# Patient Record
Sex: MALE | Race: WHITE | NOT HISPANIC OR LATINO | Employment: UNEMPLOYED | URBAN - METROPOLITAN AREA
[De-identification: names, ages, dates, MRNs, and addresses within clinical notes are randomized per-mention and may not be internally consistent; named-entity substitution may affect disease eponyms.]

---

## 2018-09-11 ENCOUNTER — TELEPHONE (OUTPATIENT)
Dept: MEDICAL GROUP | Facility: PHYSICIAN GROUP | Age: 53
End: 2018-09-11

## 2018-09-11 RX ORDER — LOSARTAN POTASSIUM 50 MG/1
50 TABLET ORAL DAILY
COMMUNITY
End: 2018-09-12 | Stop reason: SDUPTHER

## 2018-09-11 RX ORDER — AMLODIPINE BESYLATE 10 MG/1
10 TABLET ORAL DAILY
COMMUNITY
End: 2018-09-12 | Stop reason: SDUPTHER

## 2018-09-11 RX ORDER — HYDROXYZINE HYDROCHLORIDE 25 MG/1
25 TABLET, FILM COATED ORAL 3 TIMES DAILY PRN
COMMUNITY
End: 2018-09-12 | Stop reason: SDUPTHER

## 2018-09-11 NOTE — TELEPHONE ENCOUNTER
Future Appointments       Provider Department Center    9/12/2018 4:35 PM Radha Castro M.D. [facility]        NEW PATIENT VISIT PRE-VISIT PLANNING    1.  EpicCare Patient is checked in Patient Demographics? YES    2.  Immunizations were updated in Epic using WebIZ?: No WebIZ record       •  Web Iz Recommendations: FLU, TDAP and ZOSTAVAX (Shingles)    3.  Is this appointment scheduled as a Hospital Follow-Up? No    4.  Patient is due for the following Health Maintenance Topics:   Health Maintenance Due   Topic Date Due   • IMM DTaP/Tdap/Td Vaccine (1 - Tdap) 04/06/1983   • COLONOSCOPY  04/06/2014   • IMM ZOSTER VACCINES (1 of 2) 04/06/2014   • IMM INFLUENZA (1) 09/01/2018       5.  Reviewed/Updated the following with patient:   •   Preferred Pharmacy? YES       •   Preferred Lab? YES       •   Preferred Communication? YES       •   Allergies? YES       •   Medications? YES. Was Abstract Encounter opened and chart updated? YES       •   Social History? YES. Was Abstract Encounter opened and chart updated? YES       •   Family History (document living status of immediate family members and if + hx of cancer, diabetes, hypertension, hyperlipidemia, heart attack, stroke) YES. Was Abstract Encounter opened and chart updated? YES    6.  Updated Care Team?       •   DME Company (gait device, O2, CPAP, etc.) YES       •   Other Specialists (eye doctor, derm, GYN, cardiology, endo, etc): YES    7.  MDX printed for Provider? NO    8.  Patient was informed to arrive 15 min prior to their   scheduled appointment and bring in their medication bottles.

## 2018-09-12 ENCOUNTER — OFFICE VISIT (OUTPATIENT)
Dept: MEDICAL GROUP | Facility: PHYSICIAN GROUP | Age: 53
End: 2018-09-12
Payer: COMMERCIAL

## 2018-09-12 VITALS
HEART RATE: 90 BPM | WEIGHT: 306 LBS | OXYGEN SATURATION: 97 % | TEMPERATURE: 98.9 F | BODY MASS INDEX: 43.81 KG/M2 | SYSTOLIC BLOOD PRESSURE: 140 MMHG | DIASTOLIC BLOOD PRESSURE: 90 MMHG | RESPIRATION RATE: 18 BRPM | HEIGHT: 70 IN

## 2018-09-12 DIAGNOSIS — G47.26 SHIFT WORK SLEEP DISORDER: ICD-10-CM

## 2018-09-12 DIAGNOSIS — E66.01 MORBID OBESITY WITH BMI OF 40.0-44.9, ADULT (HCC): ICD-10-CM

## 2018-09-12 DIAGNOSIS — Z12.12 SCREENING FOR COLORECTAL CANCER: ICD-10-CM

## 2018-09-12 DIAGNOSIS — E78.2 MIXED HYPERLIPIDEMIA: ICD-10-CM

## 2018-09-12 DIAGNOSIS — Z23 NEED FOR VACCINATION: ICD-10-CM

## 2018-09-12 DIAGNOSIS — Z12.5 SCREENING FOR PROSTATE CANCER: ICD-10-CM

## 2018-09-12 DIAGNOSIS — Z12.11 SCREENING FOR COLORECTAL CANCER: ICD-10-CM

## 2018-09-12 DIAGNOSIS — L30.9 DERMATITIS: ICD-10-CM

## 2018-09-12 DIAGNOSIS — I10 ESSENTIAL HYPERTENSION: ICD-10-CM

## 2018-09-12 PROCEDURE — 90686 IIV4 VACC NO PRSV 0.5 ML IM: CPT | Performed by: FAMILY MEDICINE

## 2018-09-12 PROCEDURE — 99204 OFFICE O/P NEW MOD 45 MIN: CPT | Mod: 25 | Performed by: FAMILY MEDICINE

## 2018-09-12 PROCEDURE — 90471 IMMUNIZATION ADMIN: CPT | Performed by: FAMILY MEDICINE

## 2018-09-12 RX ORDER — ZOLPIDEM TARTRATE 10 MG/1
10 TABLET ORAL
COMMUNITY
Start: 2018-02-26 | End: 2018-09-12 | Stop reason: SDUPTHER

## 2018-09-12 RX ORDER — AMLODIPINE BESYLATE 10 MG/1
10 TABLET ORAL DAILY
Qty: 90 TAB | Refills: 2 | Status: SHIPPED | OUTPATIENT
Start: 2018-09-12 | End: 2019-01-31 | Stop reason: SDUPTHER

## 2018-09-12 RX ORDER — LOSARTAN POTASSIUM 50 MG/1
50 TABLET ORAL DAILY
Qty: 90 TAB | Refills: 3 | Status: SHIPPED | OUTPATIENT
Start: 2018-09-12 | End: 2019-01-31 | Stop reason: SDUPTHER

## 2018-09-12 RX ORDER — ZOLPIDEM TARTRATE 10 MG/1
10 TABLET ORAL NIGHTLY PRN
Qty: 30 TAB | Refills: 1 | Status: SHIPPED | OUTPATIENT
Start: 2018-09-12 | End: 2018-11-11

## 2018-09-12 RX ORDER — CLOBETASOL PROPIONATE 0.5 MG/G
1 OINTMENT TOPICAL 2 TIMES DAILY
Qty: 60 G | Refills: 1 | Status: SHIPPED | OUTPATIENT
Start: 2018-09-12 | End: 2019-08-27

## 2018-09-12 RX ORDER — HYDROXYZINE HYDROCHLORIDE 25 MG/1
25 TABLET, FILM COATED ORAL 3 TIMES DAILY PRN
Qty: 30 TAB | Refills: 5 | Status: SHIPPED | OUTPATIENT
Start: 2018-09-12 | End: 2019-01-31 | Stop reason: SDUPTHER

## 2018-09-12 ASSESSMENT — PATIENT HEALTH QUESTIONNAIRE - PHQ9: CLINICAL INTERPRETATION OF PHQ2 SCORE: 0

## 2018-09-13 PROBLEM — L30.9 DERMATITIS: Status: ACTIVE | Noted: 2018-09-13

## 2018-09-13 NOTE — PROGRESS NOTES
cc: obesity       Subjective:     Braden Cruz is a 53 y.o. male presenting for the following:     Obesity: Patient has struggled with his weight for most of his adult life.  He has done many different diets in the past and has managed to lose 20-30 pounds but the weight will come back quickly once these diet stop.  He has now developed high blood pressure (controlled with amlodipine and losartan) and dyslipidemia.  He has not had elevated blood sugars, but is of course worried about developing diabetes as well.  He has looked into this and thought about it for a long time, and he is decided he is very interested in the gastric sleeve bariatric surgery.    Hypertension: Takes his amlodipine and losartan daily.  Denies lightheadedness, palpitations, chest pain, swelling.    Hyperlipidemia: Has been told that he has high cholesterol but that his overall risk is borderline for starting statin.  He has never been on a statin in the past.  He is of course hoping that this problem will improve with weight loss.    Eczema: For more than a year patient has had difficulty with dry itchy skin over his anterior legs bilaterally.  He denies difficulty with swelling/edema.  It is never become infected, warm, red, weeping, painful.  It is just very itchy.  He has problem with dry itchy skin chronically, does take Atarax for this occasionally which helps him sleep.    4 days he works, patient has to get up at 1 AM.  Then over the weekend he will adjust to a more normal sleeping schedule.  Then when he goes to bed at 4 PM on Sunday he will have a very hard time getting to sleep.  So on Sundays he uses Ambien.  He is well aware that Ambien is addictive, he only uses this once or twice per week.  He has been stable on this dose and frequency for many years.    Review of systems:  All others reviewed and are negative.       Current Outpatient Prescriptions:   •  amLODIPine (NORVASC) 10 MG Tab, Take 1 Tab by mouth every  "day., Disp: 90 Tab, Rfl: 2  •  hydrOXYzine HCl (ATARAX) 25 MG Tab, Take 1 Tab by mouth 3 times a day as needed for Itching., Disp: 30 Tab, Rfl: 5  •  zolpidem (AMBIEN) 10 MG Tab, Take 1 Tab by mouth at bedtime as needed for Sleep for up to 60 days., Disp: 30 Tab, Rfl: 1  •  losartan (COZAAR) 50 MG Tab, Take 1 Tab by mouth every day., Disp: 90 Tab, Rfl: 3  •  clobetasol (TEMOVATE) 0.05 % Ointment, Apply 1 Application to affected area(s) 2 times a day., Disp: 60 g, Rfl: 1    Allergies, past medical history, past surgical history, family history, social history reviewed and updated    Objective:     Vitals: /90   Pulse 90   Temp 37.2 °C (98.9 °F)   Resp 18   Ht 1.778 m (5' 10\")   Wt (!) 138.8 kg (306 lb)   SpO2 97%   BMI 43.91 kg/m²   General: Alert, pleasant, NAD  HEENT: Normocephalic.   EOMI, no icterus or pallor.  Conjunctivae and lids normal. External ears normal. Oropharynx non-erythematous, mucous membranes moist.    Neck supple.  No thyromegaly or masses palpated. No cervical or supraclavicular lymphadenopathy.  Heart: Regular rate and rhythm.  S1 and S2 normal.  No murmurs appreciated.  Respiratory: Normal respiratory effort.  Clear to auscultation bilaterally.  Abdomen: Non-distended, soft  Skin: Warm, dry.  Anterior legs bilaterally with patches of dry skin and scattered excoriations.  No induration, erythema, warmth.  Musculoskeletal: Gait is normal.  Moves all extremities well.  Extremities: No leg edema.    Neurological: No tremors, sensation grossly intact,  tone/strength normal, gait is normal, CN2-12 grossly intact  Psych:  Affect is normal, judgement is good, memory is intact, grooming is appropriate.    Assessment/Plan:     Braden was seen today for annual exam and weight loss.    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  He only uses this as needed and is stable on dose and frequency of max twice per " week.  -     zolpidem (AMBIEN) 10 MG Tab; Take 1 Tab by mouth at bedtime as needed for Sleep for up to 60 days.    Morbid obesity with BMI of 40.0-44.9, adult (HCC)-patient is very interested in the gastric sleeve bariatric surgery.  He has struggled with his weight for his entire adult life and has never been able to keep a substantial amount of weight off.  Will refer.  -     Patient identified as having weight management issue.  Appropriate orders and counseling given.  -     HEMOGLOBIN A1C; Future  -     LIPID PROFILE; Future  -     COMP METABOLIC PANEL; Future  -     CBC WITHOUT DIFFERENTIAL; Future  -     TSH WITH REFLEX TO FT4; Future  -     REFERRAL TO BARIATRIC SURGERY    Screening for colorectal cancer no melena or bright red blood per rectum.  No weight loss.  No first-degree relative with colon cancer.  -     OCCULT BLOOD FECES IMMUNOASSAY (FIT); Future    Screening for prostate cancer-no nocturia, dysuria, frequency, hesitancy.  No first-degree relatives with prostate cancer.  Patient counseled on risk of false positive with PSA.  Patient chooses to do the screening.  -     PROSTATE SPECIFIC AG SCREENING; Future    Need for vaccination   Patient due for vaccination.  Patient warned of possible side effect including pain, reaction at injection site, fatigue, low-grade fever.  Also, patient warned of uncommon severe reactions including allergic reaction/anaphylaxis.     -     INFLUENZA VACCINE QUAD INJ >3Y(PF)    Essential hypertension-currently controlled with medication.  Patient is hoping that this problem improves after weight loss.  -     amLODIPine (NORVASC) 10 MG Tab; Take 1 Tab by mouth every day.  -     losartan (COZAAR) 50 MG Tab; Take 1 Tab by mouth every day.  -     REFERRAL TO BARIATRIC SURGERY    Mixed hyperlipidemia-patient has never been on a statin, told he was borderline.  -     REFERRAL TO BARIATRIC SURGERY    Dermatitis-like eczema without edema.  Possibly early stasis dermatitis, but  would be mild and early as no swelling.  No signs of infection currently patient warned of side effect of thinning skin with chronic use of steroid.  Also told to stop using if any signs of infection.  -     hydrOXYzine HCl (ATARAX) 25 MG Tab; Take 1 Tab by mouth 3 times a day as needed for Itching.  -     clobetasol (TEMOVATE) 0.05 % Ointment; Apply 1 Application to affected area(s) 2 times a day.    Return if symptoms worsen or fail to improve.

## 2018-10-09 ENCOUNTER — TELEPHONE (OUTPATIENT)
Dept: MEDICAL GROUP | Facility: PHYSICIAN GROUP | Age: 53
End: 2018-10-09

## 2018-10-09 NOTE — TELEPHONE ENCOUNTER
Future Appointments       Provider Department Center    10/10/2018 4:55 PM Radha Castro M.D. [facility]        ESTABLISHED PATIENT PRE-VISIT PLANNING     Note: Patient will not be contacted if there is no indication to call.     1.  Reviewed notes from the last few office visits within the medical group: Yes    2.  If any orders were placed at last visit or intended to be done for this visit (i.e. 6 mos follow-up), do we have Results/Consult Notes?        •  Labs - Labs ordered, NOT completed. Patient advised to complete prior to next appointment.       •  Imaging - Imaging was not ordered at last office visit.       •  Referrals - Referral ordered, patient has NOT been seen.    3. Is this appointment scheduled as a Hospital Follow-Up? No    4.  Immunizations were updated in [email or web address] using WebIZ?: Yes       •  Web Iz Recommendations: MENACTRA (MCV4), TDAP, VARICELLA (Chicken Pox)  and ZOSTAVAX (Shingles)    5.  Patient is due for the following Health Maintenance Topics:   Health Maintenance Due   Topic Date Due   • COLONOSCOPY  04/06/2014   • IMM ZOSTER VACCINES (1 of 2) 04/06/2014   • IMM DTaP/Tdap/Td Vaccine (2 - Td) 01/01/2018       6.  MDX printed for Provider? NO    7.  Patient was informed to arrive 15 min prior to their scheduled appointment and bring in their medication bottles. Confirmed through automated call

## 2018-12-07 ENCOUNTER — TELEPHONE (OUTPATIENT)
Dept: MEDICAL GROUP | Facility: PHYSICIAN GROUP | Age: 53
End: 2018-12-07

## 2018-12-07 DIAGNOSIS — I10 ESSENTIAL HYPERTENSION: ICD-10-CM

## 2018-12-07 DIAGNOSIS — L30.9 DERMATITIS: ICD-10-CM

## 2018-12-11 ENCOUNTER — TELEPHONE (OUTPATIENT)
Dept: MEDICAL GROUP | Facility: PHYSICIAN GROUP | Age: 53
End: 2018-12-11

## 2018-12-12 ENCOUNTER — HOSPITAL ENCOUNTER (OUTPATIENT)
Dept: LAB | Facility: MEDICAL CENTER | Age: 53
End: 2018-12-12
Attending: FAMILY MEDICINE
Payer: COMMERCIAL

## 2018-12-12 ENCOUNTER — OFFICE VISIT (OUTPATIENT)
Dept: MEDICAL GROUP | Facility: PHYSICIAN GROUP | Age: 53
End: 2018-12-12
Payer: COMMERCIAL

## 2018-12-12 VITALS
DIASTOLIC BLOOD PRESSURE: 90 MMHG | BODY MASS INDEX: 43.75 KG/M2 | SYSTOLIC BLOOD PRESSURE: 154 MMHG | HEIGHT: 70 IN | OXYGEN SATURATION: 93 % | HEART RATE: 107 BPM | WEIGHT: 305.6 LBS | TEMPERATURE: 98.4 F

## 2018-12-12 DIAGNOSIS — Z71.3 WEIGHT LOSS COUNSELING, ENCOUNTER FOR: ICD-10-CM

## 2018-12-12 DIAGNOSIS — Z12.5 SCREENING FOR PROSTATE CANCER: ICD-10-CM

## 2018-12-12 DIAGNOSIS — E66.01 MORBID OBESITY WITH BMI OF 40.0-44.9, ADULT (HCC): ICD-10-CM

## 2018-12-12 DIAGNOSIS — I10 ESSENTIAL HYPERTENSION: ICD-10-CM

## 2018-12-12 LAB
ALBUMIN SERPL BCP-MCNC: 4.1 G/DL (ref 3.2–4.9)
ALBUMIN/GLOB SERPL: 1.3 G/DL
ALP SERPL-CCNC: 89 U/L (ref 30–99)
ALT SERPL-CCNC: 21 U/L (ref 2–50)
ANION GAP SERPL CALC-SCNC: 6 MMOL/L (ref 0–11.9)
AST SERPL-CCNC: 19 U/L (ref 12–45)
BILIRUB SERPL-MCNC: 0.8 MG/DL (ref 0.1–1.5)
BUN SERPL-MCNC: 10 MG/DL (ref 8–22)
CALCIUM SERPL-MCNC: 9.7 MG/DL (ref 8.5–10.5)
CHLORIDE SERPL-SCNC: 104 MMOL/L (ref 96–112)
CHOLEST SERPL-MCNC: 264 MG/DL (ref 100–199)
CO2 SERPL-SCNC: 29 MMOL/L (ref 20–33)
CREAT SERPL-MCNC: 0.71 MG/DL (ref 0.5–1.4)
ERYTHROCYTE [DISTWIDTH] IN BLOOD BY AUTOMATED COUNT: 43.5 FL (ref 35.9–50)
EST. AVERAGE GLUCOSE BLD GHB EST-MCNC: 143 MG/DL
FASTING STATUS PATIENT QL REPORTED: NORMAL
GLOBULIN SER CALC-MCNC: 3.2 G/DL (ref 1.9–3.5)
GLUCOSE SERPL-MCNC: 96 MG/DL (ref 65–99)
HBA1C MFR BLD: 6.6 % (ref 0–5.6)
HCT VFR BLD AUTO: 49.5 % (ref 42–52)
HDLC SERPL-MCNC: 58 MG/DL
HGB BLD-MCNC: 16.7 G/DL (ref 14–18)
LDLC SERPL CALC-MCNC: 179 MG/DL
MCH RBC QN AUTO: 32 PG (ref 27–33)
MCHC RBC AUTO-ENTMCNC: 33.7 G/DL (ref 33.7–35.3)
MCV RBC AUTO: 94.8 FL (ref 81.4–97.8)
PLATELET # BLD AUTO: 236 K/UL (ref 164–446)
PMV BLD AUTO: 9 FL (ref 9–12.9)
POTASSIUM SERPL-SCNC: 4.1 MMOL/L (ref 3.6–5.5)
PROT SERPL-MCNC: 7.3 G/DL (ref 6–8.2)
PSA SERPL-MCNC: 0.66 NG/ML (ref 0–4)
RBC # BLD AUTO: 5.22 M/UL (ref 4.7–6.1)
SODIUM SERPL-SCNC: 139 MMOL/L (ref 135–145)
TRIGL SERPL-MCNC: 133 MG/DL (ref 0–149)
TSH SERPL DL<=0.005 MIU/L-ACNC: 2.77 UIU/ML (ref 0.38–5.33)
WBC # BLD AUTO: 8.9 K/UL (ref 4.8–10.8)

## 2018-12-12 PROCEDURE — 36415 COLL VENOUS BLD VENIPUNCTURE: CPT

## 2018-12-12 PROCEDURE — 84153 ASSAY OF PSA TOTAL: CPT

## 2018-12-12 PROCEDURE — 80061 LIPID PANEL: CPT

## 2018-12-12 PROCEDURE — 80053 COMPREHEN METABOLIC PANEL: CPT

## 2018-12-12 PROCEDURE — 85027 COMPLETE CBC AUTOMATED: CPT

## 2018-12-12 PROCEDURE — 99213 OFFICE O/P EST LOW 20 MIN: CPT | Performed by: FAMILY MEDICINE

## 2018-12-12 PROCEDURE — 83036 HEMOGLOBIN GLYCOSYLATED A1C: CPT

## 2018-12-12 PROCEDURE — 84443 ASSAY THYROID STIM HORMONE: CPT

## 2018-12-12 RX ORDER — ZOLPIDEM TARTRATE 10 MG/1
TABLET ORAL
COMMUNITY
Start: 2018-11-19 | End: 2019-01-07 | Stop reason: SDUPTHER

## 2018-12-12 NOTE — PROGRESS NOTES
"cc: weight loss counseling      Subjective:     Braden Cruz is a 53 y.o. male presenting for the following:     Patient here for weight loss counseling.     He has been cutting down carbohydrates and is getting the [employer] meals for dinner at night. He has not seen any weight loss but is still feeling positive and motivated to change his diet and improve his health.     BP is a little high today but patient was stuck in [place] and did not have his medication last night. No CP, palpitations, swelling, headache.       Review of systems:  All others reviewed and are negative.       Current Outpatient Prescriptions:   •  amLODIPine (NORVASC) 10 MG Tab, Take 1 Tab by mouth every day., Disp: 90 Tab, Rfl: 2  •  hydrOXYzine HCl (ATARAX) 25 MG Tab, Take 1 Tab by mouth 3 times a day as needed for Itching., Disp: 30 Tab, Rfl: 5  •  losartan (COZAAR) 50 MG Tab, Take 1 Tab by mouth every day., Disp: 90 Tab, Rfl: 3  •  clobetasol (TEMOVATE) 0.05 % Ointment, Apply 1 Application to affected area(s) 2 times a day., Disp: 60 g, Rfl: 1  •  zolpidem (AMBIEN) 10 MG Tab, , Disp: , Rfl:     Allergies, past medical history, past surgical history, family history, social history reviewed and updated    Objective:     Vitals: /90 (BP Location: Left arm)   Pulse (!) 107   Temp 36.9 °C (98.4 °F) (Temporal)   Ht 1.778 m (5' 10\")   Wt (!) 138.6 kg (305 lb 9.6 oz)   SpO2 93%   BMI 43.85 kg/m²   General: Alert, pleasant, NAD  Heart: Regular rate and rhythm.  S1 and S2 normal.  No murmurs appreciated.  Abdomen:  soft, non-tender    Assessment/Plan:     Braden was seen today for follow-up.    Diagnoses and all orders for this visit:    Weight loss counseling, encounter for/Morbid obesity with BMI of 40.0-44.9, adult (HCC): patient is continuing to make good dietary changes and feels motivated. Counseled on diet changes and on exercise for joint pain and quality of life.   Patient was seen for 15 minutes face to face of " which > 50% of appointment time was spent on counseling and coordination of care regarding the above.  -is going to lab today to have blood tests drawn.     Essential hypertension: slightly high today but patient did not have medication last night: asymptomatic.       Return in about 3 months (around 3/12/2019).

## 2018-12-13 PROBLEM — E11.9 TYPE 2 DIABETES MELLITUS WITHOUT COMPLICATION, WITHOUT LONG-TERM CURRENT USE OF INSULIN (HCC): Status: ACTIVE | Noted: 2018-12-13

## 2019-01-07 DIAGNOSIS — G47.26 SHIFT WORK SLEEP DISORDER: ICD-10-CM

## 2019-01-08 ENCOUNTER — TELEPHONE (OUTPATIENT)
Dept: MEDICAL GROUP | Facility: PHYSICIAN GROUP | Age: 54
End: 2019-01-08

## 2019-01-08 RX ORDER — ZOLPIDEM TARTRATE 10 MG/1
10 TABLET ORAL NIGHTLY PRN
Qty: 60 TAB | Refills: 0 | Status: SHIPPED | OUTPATIENT
Start: 2019-01-08 | End: 2019-04-09 | Stop reason: SDUPTHER

## 2019-01-31 DIAGNOSIS — L30.9 DERMATITIS: ICD-10-CM

## 2019-01-31 DIAGNOSIS — I10 ESSENTIAL HYPERTENSION: ICD-10-CM

## 2019-02-01 RX ORDER — LOSARTAN POTASSIUM 50 MG/1
50 TABLET ORAL DAILY
Qty: 90 TAB | Refills: 1 | Status: SHIPPED | OUTPATIENT
Start: 2019-02-01 | End: 2019-08-14 | Stop reason: SDUPTHER

## 2019-02-01 RX ORDER — AMLODIPINE BESYLATE 10 MG/1
10 TABLET ORAL DAILY
Qty: 90 TAB | Refills: 1 | Status: SHIPPED | OUTPATIENT
Start: 2019-02-01 | End: 2019-08-14 | Stop reason: SDUPTHER

## 2019-02-01 RX ORDER — HYDROXYZINE HYDROCHLORIDE 25 MG/1
25 TABLET, FILM COATED ORAL 3 TIMES DAILY PRN
Qty: 30 TAB | Refills: 5 | Status: SHIPPED | OUTPATIENT
Start: 2019-02-01 | End: 2019-08-14 | Stop reason: SDUPTHER

## 2019-02-01 NOTE — TELEPHONE ENCOUNTER
Refill X 6 months, sent to pharmacy.Pt. Seen in the last 6 months per protocol.   Lab Results   Component Value Date/Time    SODIUM 139 12/12/2018 10:38 AM    POTASSIUM 4.1 12/12/2018 10:38 AM    CHLORIDE 104 12/12/2018 10:38 AM    CO2 29 12/12/2018 10:38 AM    GLUCOSE 96 12/12/2018 10:38 AM    BUN 10 12/12/2018 10:38 AM    CREATININE 0.71 12/12/2018 10:38 AM

## 2019-03-19 DIAGNOSIS — G47.26 SHIFT WORK SLEEP DISORDER: ICD-10-CM

## 2019-03-19 RX ORDER — ZOLPIDEM TARTRATE 10 MG/1
10 TABLET ORAL NIGHTLY PRN
Qty: 60 TAB | Refills: 0 | Status: CANCELLED | OUTPATIENT
Start: 2019-03-19 | End: 2019-05-18

## 2019-03-19 RX ORDER — ZOLPIDEM TARTRATE 10 MG/1
10 TABLET ORAL NIGHTLY PRN
Qty: 60 TAB | Refills: 0 | OUTPATIENT
Start: 2019-03-19 | End: 2019-05-18

## 2019-03-19 NOTE — TELEPHONE ENCOUNTER
On clinic appointment, patient claimed to only use ambien 2-3 times per week. But patient picked up 30 tablets less than 30 days ago. So needs appointment to discuss this if his usage has increased.

## 2019-04-01 ENCOUNTER — APPOINTMENT (OUTPATIENT)
Dept: RADIOLOGY | Facility: IMAGING CENTER | Age: 54
End: 2019-04-01
Attending: FAMILY MEDICINE
Payer: COMMERCIAL

## 2019-04-01 ENCOUNTER — OFFICE VISIT (OUTPATIENT)
Dept: MEDICAL GROUP | Facility: PHYSICIAN GROUP | Age: 54
End: 2019-04-01
Payer: COMMERCIAL

## 2019-04-01 VITALS
HEIGHT: 70 IN | BODY MASS INDEX: 43.67 KG/M2 | OXYGEN SATURATION: 97 % | RESPIRATION RATE: 18 BRPM | SYSTOLIC BLOOD PRESSURE: 140 MMHG | DIASTOLIC BLOOD PRESSURE: 90 MMHG | WEIGHT: 305 LBS | TEMPERATURE: 97.3 F | HEART RATE: 78 BPM

## 2019-04-01 DIAGNOSIS — G89.29 CHRONIC PAIN OF BOTH KNEES: ICD-10-CM

## 2019-04-01 DIAGNOSIS — M25.561 CHRONIC PAIN OF BOTH KNEES: ICD-10-CM

## 2019-04-01 DIAGNOSIS — E66.01 MORBID OBESITY WITH BMI OF 40.0-44.9, ADULT (HCC): ICD-10-CM

## 2019-04-01 DIAGNOSIS — M25.562 CHRONIC PAIN OF BOTH KNEES: ICD-10-CM

## 2019-04-01 DIAGNOSIS — E11.9 TYPE 2 DIABETES MELLITUS WITHOUT COMPLICATION, WITHOUT LONG-TERM CURRENT USE OF INSULIN (HCC): ICD-10-CM

## 2019-04-01 PROCEDURE — 73562 X-RAY EXAM OF KNEE 3: CPT | Mod: TC,LT | Performed by: FAMILY MEDICINE

## 2019-04-01 PROCEDURE — 99214 OFFICE O/P EST MOD 30 MIN: CPT | Performed by: FAMILY MEDICINE

## 2019-04-01 PROCEDURE — 73562 X-RAY EXAM OF KNEE 3: CPT | Mod: TC,RT | Performed by: FAMILY MEDICINE

## 2019-04-01 ASSESSMENT — PATIENT HEALTH QUESTIONNAIRE - PHQ9: CLINICAL INTERPRETATION OF PHQ2 SCORE: 0

## 2019-04-01 NOTE — PROGRESS NOTES
"cc: Knee pain      Subjective:     Braden Cruz is a 53 y.o. male presenting for the following:     Obesity: Still trying to stick to a diet. Has decreased carbohydrates. Exercise is limited by knee pain.  He is aware that his hemoglobin A1c was just into the diabetic range at 6.6.    Knee pain: Patient does have chronic bilateral knee pain. He has had arthroscopic surgery more than 10 years ago on the right knee. He now has bilateral knee pain but worse on the left.  This pain is worse when he stands up from sitting down for a long time or going downstairs.  The pain is mostly lateral to the patella.  He does not have any weakness in the knees and the knees have not given out on him.  No changes in sensation or numbness.  No leg swelling.        Review of systems:  All others reviewed and are negative.       Current Outpatient Prescriptions:   •  amLODIPine (NORVASC) 10 MG Tab, Take 1 Tab by mouth every day., Disp: 90 Tab, Rfl: 1  •  hydrOXYzine HCl (ATARAX) 25 MG Tab, Take 1 Tab by mouth 3 times a day as needed for Itching., Disp: 30 Tab, Rfl: 5  •  losartan (COZAAR) 50 MG Tab, Take 1 Tab by mouth every day., Disp: 90 Tab, Rfl: 1  •  clobetasol (TEMOVATE) 0.05 % Ointment, Apply 1 Application to affected area(s) 2 times a day., Disp: 60 g, Rfl: 1    Allergies, past medical history, past surgical history, family history, social history reviewed and updated    Objective:     Vitals: /90 (BP Location: Right arm, Patient Position: Sitting, BP Cuff Size: Large adult)   Pulse 78   Temp 36.3 °C (97.3 °F) (Temporal)   Resp 18   Ht 1.778 m (5' 10\")   Wt (!) 138.3 kg (305 lb)   SpO2 97%   BMI 43.76 kg/m²   General: Alert, pleasant, NAD  Heart: Regular rate and rhythm.  S1 and S2 normal.  No murmurs appreciated.  Respiratory: Normal respiratory effort.  Clear to auscultation bilaterally.  Musculoskeletal: Gait is normal.  Moves all extremities well.  Knees bilaterally without deformity or skin " changes.  Full range of motion.  Nontender.  No laxity.  Extremities: No leg edema.      Assessment/Plan:     Braden was seen today for weight loss and knee pain.    Diagnoses and all orders for this visit:    Type 2 diabetes mellitus without complication, without long-term current use of insulin ([facility])  Morbid obesity with BMI of 40.0-44.9, adult ([facility]):   Patient with hemoglobin A1c of 6.6.  He is aware that this is just into the diabetic range.  He would like to try to lose weight on his own before starting metformin or a statin.  He is already on losartan.  I also explained the importance of blood pressure control with this, but he has been better controlled in the past and he is just now starting a diet.    Chronic pain of both knees: Likely a mixture of arthritis and PF PS.  Will obtain x-rays for baseline and refer to physical therapy.  Also patient given exercises and stretches today. If no improvement with PT in 4-6 weeks, patient to call office and will refer to Ortho.   -     DX-KNEE 3 VIEWS LEFT; Future  -     DX-KNEE 3 VIEWS RIGHT; Future  -     REFERRAL TO PHYSICAL THERAPY Reason for Therapy: Eval/Treat/Report      Return in about 3 months (around 7/1/2019), or if symptoms worsen or fail to improve.

## 2019-04-02 ENCOUNTER — PATIENT MESSAGE (OUTPATIENT)
Dept: HEALTH INFORMATION MANAGEMENT | Facility: OTHER | Age: 54
End: 2019-04-02

## 2019-04-09 DIAGNOSIS — G47.26 SHIFT WORK SLEEP DISORDER: ICD-10-CM

## 2019-04-09 RX ORDER — ZOLPIDEM TARTRATE 10 MG/1
10 TABLET ORAL NIGHTLY PRN
Qty: 60 TAB | Refills: 0 | Status: SHIPPED | OUTPATIENT
Start: 2019-04-09 | End: 2019-08-27 | Stop reason: SDUPTHER

## 2019-04-11 ENCOUNTER — PATIENT MESSAGE (OUTPATIENT)
Dept: MEDICAL GROUP | Facility: PHYSICIAN GROUP | Age: 54
End: 2019-04-11

## 2019-04-11 DIAGNOSIS — M25.561 CHRONIC PAIN OF BOTH KNEES: ICD-10-CM

## 2019-04-11 DIAGNOSIS — M25.562 CHRONIC PAIN OF BOTH KNEES: ICD-10-CM

## 2019-04-11 DIAGNOSIS — G89.29 CHRONIC PAIN OF BOTH KNEES: ICD-10-CM

## 2019-05-02 ENCOUNTER — HOSPITAL ENCOUNTER (EMERGENCY)
Facility: MEDICAL CENTER | Age: 54
End: 2019-05-02
Attending: EMERGENCY MEDICINE
Payer: COMMERCIAL

## 2019-05-02 ENCOUNTER — APPOINTMENT (OUTPATIENT)
Dept: RADIOLOGY | Facility: MEDICAL CENTER | Age: 54
End: 2019-05-02
Attending: EMERGENCY MEDICINE
Payer: COMMERCIAL

## 2019-05-02 VITALS
WEIGHT: 305 LBS | RESPIRATION RATE: 18 BRPM | OXYGEN SATURATION: 97 % | HEART RATE: 86 BPM | DIASTOLIC BLOOD PRESSURE: 86 MMHG | BODY MASS INDEX: 43.67 KG/M2 | HEIGHT: 70 IN | TEMPERATURE: 98 F | SYSTOLIC BLOOD PRESSURE: 125 MMHG

## 2019-05-02 DIAGNOSIS — S89.90XA KNEE INJURY, INITIAL ENCOUNTER: ICD-10-CM

## 2019-05-02 PROCEDURE — 73564 X-RAY EXAM KNEE 4 OR MORE: CPT | Mod: LT

## 2019-05-02 PROCEDURE — 99284 EMERGENCY DEPT VISIT MOD MDM: CPT

## 2019-05-02 PROCEDURE — 96372 THER/PROPH/DIAG INJ SC/IM: CPT

## 2019-05-02 PROCEDURE — 700111 HCHG RX REV CODE 636 W/ 250 OVERRIDE (IP): Performed by: EMERGENCY MEDICINE

## 2019-05-02 RX ORDER — ONDANSETRON 4 MG/1
4 TABLET, ORALLY DISINTEGRATING ORAL ONCE
Status: COMPLETED | OUTPATIENT
Start: 2019-05-02 | End: 2019-05-02

## 2019-05-02 RX ORDER — HYDROCODONE BITARTRATE AND ACETAMINOPHEN 5; 325 MG/1; MG/1
1-2 TABLET ORAL EVERY 6 HOURS PRN
Qty: 20 TAB | Refills: 0 | Status: SHIPPED | OUTPATIENT
Start: 2019-05-02 | End: 2019-05-07

## 2019-05-02 RX ORDER — MORPHINE SULFATE 4 MG/ML
4 INJECTION, SOLUTION INTRAMUSCULAR; INTRAVENOUS ONCE
Status: COMPLETED | OUTPATIENT
Start: 2019-05-02 | End: 2019-05-02

## 2019-05-02 RX ORDER — IBUPROFEN 600 MG/1
600 TABLET ORAL EVERY 6 HOURS PRN
Qty: 30 TAB | Refills: 0 | Status: SHIPPED | OUTPATIENT
Start: 2019-05-02 | End: 2019-05-22

## 2019-05-02 RX ADMIN — ONDANSETRON 4 MG: 4 TABLET, ORALLY DISINTEGRATING ORAL at 17:41

## 2019-05-02 RX ADMIN — MORPHINE SULFATE 4 MG: 4 INJECTION INTRAVENOUS at 17:41

## 2019-05-02 ASSESSMENT — LIFESTYLE VARIABLES: DO YOU DRINK ALCOHOL: NO

## 2019-05-02 NOTE — ED TRIAGE NOTES
"WC to triage w/ c/o L knee pain secondary to slipping and twisting it.  Pt reports feeling a \"pop\".  States +cms.    "

## 2019-05-03 NOTE — ED NOTES
All results back  Chart up for re-evaluation   Pt reports bilateral feet numbness radiating to the calves.

## 2019-05-03 NOTE — DISCHARGE INSTRUCTIONS
X-ray shows no evidence of fracture.  You may have a ligament injury.  Wear the knee immobilizer when you are up and ambulating.  Follow-up with your orthopedic doctor for further evaluation.  Take the medication as directed.  Ice and elevate.

## 2019-05-03 NOTE — ED PROVIDER NOTES
"CHIEF COMPLAINT  Chief Complaint   Patient presents with   • Knee Pain   • T-5000       HPI  Braden Cruz is a 54 y.o. male who presents to the emergency department with left knee pain.  Patient was out in the yard when he stepped funny on a curb and twisted his knee.  He is having lateral left knee pain.  Patient has a history of bilateral chronic knee problems.  He saw Dr. Kuhn yesterday and has MRI scheduled of both knees.  He is having much worse pain after twisting his left knee.  He is having some swelling.  He denies any numbness or tingling or coolness to his foot.  He denies any deformity/dislocation of his knee.  He denies any hip pain.    REVIEW OF SYSTEMS  See HPI for further details.  No numbness coolness tingling, no hip pain, no bleeding tendencies.    PAST MEDICAL HISTORY  Chronic bilateral knee pain        SOCIAL HISTORY  Social History     Social History   • Marital status:      Spouse name: N/A   • Number of children: N/A   • Years of education: N/A     Social History Main Topics   • Smoking status: Never Smoker   • Smokeless tobacco: Never Used   • Alcohol use Yes   • Drug use: No   • Sexual activity: Not on file     Other Topics Concern   • Not on file     Social History Narrative   • No narrative on file       SURGICAL HISTORY  No past surgical history on file.    CURRENT MEDICATIONS  Home Medications    **Home medications have not yet been reviewed for this encounter**         ALLERGIES  No Known Allergies    PHYSICAL EXAM  VITAL SIGNS: /95   Pulse 97   Temp 36.7 °C (98 °F) (Temporal)   Resp 16   Ht 1.778 m (5' 10\")   Wt (!) 138.3 kg (305 lb)   SpO2 97%   BMI 43.76 kg/m²       Constitutional: Well developed, Well nourished, No acute distress, Non-toxic appearance.   Cardiovascular: Good pulses on the affected extremity. Good capillary refill.  Thorax & Lungs: No respiratory distress  Skin: No abrasions or evidence of ecchymosis  Musculoskeletal: Left knee " with tenderness along the lateral aspect.  Minimal swelling.  Slight knee effusion.  No warmth or erythema, no tenderness in the tib-fib area, soft compartment, normal range of motion of the hip, able to wiggle toes, +2 DP  Neurologic: Good sensation to light touch on the distal affected extremity.        RADIOLOGY/PROCEDURES  DX-KNEE COMPLETE 4+ LEFT   Final Result      1.  Negative for left knee fracture      2.  osteoarthritis          COURSE & MEDICAL DECISION MAKING  Pertinent Labs & Imaging studies reviewed. (See chart for details)  Patient presents with knee pain after twisting.  Patient is neurovascularly intact.  No history to suggest dislocation.  Differential includes a ligament injury versus fracture.  X-ray is pending.    X-ray shows no evidence of knee fracture.  He does have evidence of osteoarthritis.  Patient be placed in a knee immobilizer.  He already has MRIs ordered and I encouraged him to follow-up with those to evaluate for possible ligament injury to his knee.  He will also follow-up with his orthopedist.  He is advised to ice and elevate his knee.  He will be placed on antiinflammatories and pain meds.    In prescribing controlled substances to this patient, I certify that I have obtained and reviewed the medical history of Braden Cruz. I have also made a good vicente effort to obtain applicable records from other providers who have treated the patient and no other records are available at this time.     I have conducted a physical exam and documented it. I have reviewed Mr. Cruz’s prescription history as maintained by the [place] Prescription Monitoring Program.     I have assessed the patient’s risk for abuse, dependency, and addiction using the validated Opioid Risk Tool available at https://www.mdcalc.com/warmfk-aplh-qxix-ort-narcotic-abuse.     Given the above, I believe the benefits of controlled substance therapy outweigh the risks. The reasons for prescribing  controlled substances include non-narcotic, oral analgesic alternatives have been inadequate for pain control. Accordingly, I have discussed the risk and benefits, treatment plan, and alternative therapies with the patient.           FINAL IMPRESSION     1. Knee injury, initial encounter             Electronically signed by: Palak Shaw, 5/2/2019 5:12 PMED Provider Note

## 2019-05-03 NOTE — ED NOTES
Discharge instructions given, pt verbalized understanding.  Prescription instructions given, pt verbalized understanding.  Understands not to drive or drink alcohol while taking narcotics.  Signed narcotic form.  A&ox4.  VSS.  Pt left ER via wheelchair.  Son to drive pt home.

## 2019-05-05 ENCOUNTER — HOSPITAL ENCOUNTER (OUTPATIENT)
Dept: RADIOLOGY | Facility: MEDICAL CENTER | Age: 54
End: 2019-05-05
Attending: ORTHOPAEDIC SURGERY
Payer: COMMERCIAL

## 2019-05-05 DIAGNOSIS — M25.561 RIGHT KNEE PAIN, UNSPECIFIED CHRONICITY: ICD-10-CM

## 2019-05-05 DIAGNOSIS — M25.562 LEFT KNEE PAIN, UNSPECIFIED CHRONICITY: ICD-10-CM

## 2019-05-05 PROCEDURE — 73721 MRI JNT OF LWR EXTRE W/O DYE: CPT | Mod: RT

## 2019-05-05 PROCEDURE — 73721 MRI JNT OF LWR EXTRE W/O DYE: CPT | Mod: LT

## 2019-05-22 DIAGNOSIS — Z01.812 PRE-OPERATIVE LABORATORY EXAMINATION: ICD-10-CM

## 2019-05-22 DIAGNOSIS — Z01.810 PRE-OPERATIVE CARDIOVASCULAR EXAMINATION: ICD-10-CM

## 2019-05-22 LAB
ANION GAP SERPL CALC-SCNC: 8 MMOL/L (ref 0–11.9)
BUN SERPL-MCNC: 11 MG/DL (ref 8–22)
CALCIUM SERPL-MCNC: 9.1 MG/DL (ref 8.5–10.5)
CHLORIDE SERPL-SCNC: 103 MMOL/L (ref 96–112)
CO2 SERPL-SCNC: 28 MMOL/L (ref 20–33)
CREAT SERPL-MCNC: 0.73 MG/DL (ref 0.5–1.4)
EKG IMPRESSION: NORMAL
GLUCOSE SERPL-MCNC: 188 MG/DL (ref 65–99)
POTASSIUM SERPL-SCNC: 3.7 MMOL/L (ref 3.6–5.5)
SODIUM SERPL-SCNC: 139 MMOL/L (ref 135–145)

## 2019-05-22 PROCEDURE — 80048 BASIC METABOLIC PNL TOTAL CA: CPT

## 2019-05-22 PROCEDURE — 93010 ELECTROCARDIOGRAM REPORT: CPT | Performed by: INTERNAL MEDICINE

## 2019-05-22 PROCEDURE — 36415 COLL VENOUS BLD VENIPUNCTURE: CPT

## 2019-05-22 PROCEDURE — 93005 ELECTROCARDIOGRAM TRACING: CPT

## 2019-05-22 RX ORDER — IBUPROFEN 200 MG
200 TABLET ORAL EVERY 6 HOURS PRN
COMMUNITY
End: 2020-03-18

## 2019-05-22 NOTE — OR NURSING
"Preadmit appointment: \" Preparing for your Procedure information\" sheet given to patient with verbal and written instructions. Patient instructed to continue prescribed medications through the day before surgery, instructed to take the following medications the day of surgery per anesthesia protocol:  None, takes all med at night.  TODD given to Pt. Dr Wyatt notified of admit, health summary and BMI  "

## 2019-05-22 NOTE — OR NURSING
Ok to proceed from my standpoint.  Thank you .  Justo Wyatt M.D.  [facility]    On May 22, 2019, at 10:16, Zohreh Moore [email or web address] wrote:  Good morning Dr. Wyatt,  pt is having Bilateral knee arthroscopy PMM on 5/30/19 with Dr. Kuhn.  BMI 44.2. Pt states had sleep study 3 years ago and was negative, STOP BANG= 5, HTN, type 2 diabetes (no prescribed meds for diabetes HbA1c 6.6)

## 2019-05-23 NOTE — OR NURSING
Dr Wyatt reviewed patients medical history. Based upon information available, Dr Wyatt indicates that this patient is a candidate to have the procedure at [facility].

## 2019-05-30 ENCOUNTER — ANESTHESIA EVENT (OUTPATIENT)
Dept: SURGERY | Facility: MEDICAL CENTER | Age: 54
End: 2019-05-30
Payer: COMMERCIAL

## 2019-05-30 ENCOUNTER — HOSPITAL ENCOUNTER (OUTPATIENT)
Facility: MEDICAL CENTER | Age: 54
End: 2019-05-30
Attending: ORTHOPAEDIC SURGERY | Admitting: ORTHOPAEDIC SURGERY
Payer: COMMERCIAL

## 2019-05-30 ENCOUNTER — ANESTHESIA (OUTPATIENT)
Dept: SURGERY | Facility: MEDICAL CENTER | Age: 54
End: 2019-05-30
Payer: COMMERCIAL

## 2019-05-30 VITALS
OXYGEN SATURATION: 93 % | SYSTOLIC BLOOD PRESSURE: 135 MMHG | WEIGHT: 298.5 LBS | HEART RATE: 89 BPM | HEIGHT: 70 IN | BODY MASS INDEX: 42.73 KG/M2 | TEMPERATURE: 97.7 F | RESPIRATION RATE: 16 BRPM | DIASTOLIC BLOOD PRESSURE: 80 MMHG

## 2019-05-30 DIAGNOSIS — Z98.890 S/P ARTHROSCOPY OF KNEE: ICD-10-CM

## 2019-05-30 LAB — GLUCOSE BLD-MCNC: 112 MG/DL (ref 65–99)

## 2019-05-30 PROCEDURE — 160009 HCHG ANES TIME/MIN: Performed by: ORTHOPAEDIC SURGERY

## 2019-05-30 PROCEDURE — 700111 HCHG RX REV CODE 636 W/ 250 OVERRIDE (IP): Performed by: ANESTHESIOLOGY

## 2019-05-30 PROCEDURE — 160048 HCHG OR STATISTICAL LEVEL 1-5: Performed by: ORTHOPAEDIC SURGERY

## 2019-05-30 PROCEDURE — 160029 HCHG SURGERY MINUTES - 1ST 30 MINS LEVEL 4: Performed by: ORTHOPAEDIC SURGERY

## 2019-05-30 PROCEDURE — A9270 NON-COVERED ITEM OR SERVICE: HCPCS | Performed by: ANESTHESIOLOGY

## 2019-05-30 PROCEDURE — 160025 RECOVERY II MINUTES (STATS): Performed by: ORTHOPAEDIC SURGERY

## 2019-05-30 PROCEDURE — 700102 HCHG RX REV CODE 250 W/ 637 OVERRIDE(OP): Performed by: ANESTHESIOLOGY

## 2019-05-30 PROCEDURE — 160002 HCHG RECOVERY MINUTES (STAT): Performed by: ORTHOPAEDIC SURGERY

## 2019-05-30 PROCEDURE — 700105 HCHG RX REV CODE 258: Performed by: ORTHOPAEDIC SURGERY

## 2019-05-30 PROCEDURE — 700101 HCHG RX REV CODE 250: Performed by: ORTHOPAEDIC SURGERY

## 2019-05-30 PROCEDURE — 501838 HCHG SUTURE GENERAL: Performed by: ORTHOPAEDIC SURGERY

## 2019-05-30 PROCEDURE — 160046 HCHG PACU - 1ST 60 MINS PHASE II: Performed by: ORTHOPAEDIC SURGERY

## 2019-05-30 PROCEDURE — 160041 HCHG SURGERY MINUTES - EA ADDL 1 MIN LEVEL 4: Performed by: ORTHOPAEDIC SURGERY

## 2019-05-30 PROCEDURE — 700101 HCHG RX REV CODE 250: Performed by: ANESTHESIOLOGY

## 2019-05-30 PROCEDURE — 160036 HCHG PACU - EA ADDL 30 MINS PHASE I: Performed by: ORTHOPAEDIC SURGERY

## 2019-05-30 PROCEDURE — 82962 GLUCOSE BLOOD TEST: CPT

## 2019-05-30 PROCEDURE — 502580 HCHG PACK, KNEE ARTHROSCOPY: Performed by: ORTHOPAEDIC SURGERY

## 2019-05-30 PROCEDURE — 160035 HCHG PACU - 1ST 60 MINS PHASE I: Performed by: ORTHOPAEDIC SURGERY

## 2019-05-30 RX ORDER — DEXAMETHASONE SODIUM PHOSPHATE 4 MG/ML
INJECTION, SOLUTION INTRA-ARTICULAR; INTRALESIONAL; INTRAMUSCULAR; INTRAVENOUS; SOFT TISSUE PRN
Status: DISCONTINUED | OUTPATIENT
Start: 2019-05-30 | End: 2019-05-30 | Stop reason: SURG

## 2019-05-30 RX ORDER — BACITRACIN ZINC 500 [USP'U]/G
OINTMENT TOPICAL
Status: DISCONTINUED | OUTPATIENT
Start: 2019-05-30 | End: 2019-05-30 | Stop reason: HOSPADM

## 2019-05-30 RX ORDER — MEPERIDINE HYDROCHLORIDE 25 MG/ML
12.5 INJECTION INTRAMUSCULAR; INTRAVENOUS; SUBCUTANEOUS
Status: DISCONTINUED | OUTPATIENT
Start: 2019-05-30 | End: 2019-05-30 | Stop reason: HOSPADM

## 2019-05-30 RX ORDER — METOPROLOL TARTRATE 1 MG/ML
1 INJECTION, SOLUTION INTRAVENOUS
Status: DISCONTINUED | OUTPATIENT
Start: 2019-05-30 | End: 2019-05-30 | Stop reason: HOSPADM

## 2019-05-30 RX ORDER — OXYCODONE HCL 5 MG/5 ML
5 SOLUTION, ORAL ORAL
Status: COMPLETED | OUTPATIENT
Start: 2019-05-30 | End: 2019-05-30

## 2019-05-30 RX ORDER — DIPHENHYDRAMINE HYDROCHLORIDE 50 MG/ML
12.5 INJECTION INTRAMUSCULAR; INTRAVENOUS
Status: DISCONTINUED | OUTPATIENT
Start: 2019-05-30 | End: 2019-05-30 | Stop reason: HOSPADM

## 2019-05-30 RX ORDER — SODIUM CHLORIDE, SODIUM LACTATE, POTASSIUM CHLORIDE, CALCIUM CHLORIDE 600; 310; 30; 20 MG/100ML; MG/100ML; MG/100ML; MG/100ML
INJECTION, SOLUTION INTRAVENOUS CONTINUOUS
Status: DISCONTINUED | OUTPATIENT
Start: 2019-05-30 | End: 2019-05-30 | Stop reason: HOSPADM

## 2019-05-30 RX ORDER — CEFAZOLIN SODIUM 1 G/3ML
INJECTION, POWDER, FOR SOLUTION INTRAMUSCULAR; INTRAVENOUS PRN
Status: DISCONTINUED | OUTPATIENT
Start: 2019-05-30 | End: 2019-05-30 | Stop reason: SURG

## 2019-05-30 RX ORDER — OXYCODONE HCL 5 MG/5 ML
10 SOLUTION, ORAL ORAL
Status: COMPLETED | OUTPATIENT
Start: 2019-05-30 | End: 2019-05-30

## 2019-05-30 RX ORDER — HYDROCODONE BITARTRATE AND ACETAMINOPHEN 5; 325 MG/1; MG/1
1-2 TABLET ORAL EVERY 4 HOURS PRN
Qty: 40 TAB | Refills: 0 | Status: SHIPPED | OUTPATIENT
Start: 2019-05-30 | End: 2019-06-06

## 2019-05-30 RX ORDER — HYDRALAZINE HYDROCHLORIDE 20 MG/ML
5 INJECTION INTRAMUSCULAR; INTRAVENOUS
Status: DISCONTINUED | OUTPATIENT
Start: 2019-05-30 | End: 2019-05-30 | Stop reason: HOSPADM

## 2019-05-30 RX ORDER — KETOROLAC TROMETHAMINE 30 MG/ML
INJECTION, SOLUTION INTRAMUSCULAR; INTRAVENOUS PRN
Status: DISCONTINUED | OUTPATIENT
Start: 2019-05-30 | End: 2019-05-30 | Stop reason: SURG

## 2019-05-30 RX ORDER — ONDANSETRON 2 MG/ML
4 INJECTION INTRAMUSCULAR; INTRAVENOUS
Status: DISCONTINUED | OUTPATIENT
Start: 2019-05-30 | End: 2019-05-30 | Stop reason: HOSPADM

## 2019-05-30 RX ORDER — HYDROMORPHONE HYDROCHLORIDE 1 MG/ML
0.6 INJECTION, SOLUTION INTRAMUSCULAR; INTRAVENOUS; SUBCUTANEOUS
Status: DISCONTINUED | OUTPATIENT
Start: 2019-05-30 | End: 2019-05-30 | Stop reason: HOSPADM

## 2019-05-30 RX ORDER — CELECOXIB 200 MG/1
400 CAPSULE ORAL ONCE
Status: COMPLETED | OUTPATIENT
Start: 2019-05-30 | End: 2019-05-30

## 2019-05-30 RX ORDER — ACETAMINOPHEN 500 MG
1000 TABLET ORAL ONCE
Status: COMPLETED | OUTPATIENT
Start: 2019-05-30 | End: 2019-05-30

## 2019-05-30 RX ORDER — HYDROMORPHONE HYDROCHLORIDE 1 MG/ML
0.4 INJECTION, SOLUTION INTRAMUSCULAR; INTRAVENOUS; SUBCUTANEOUS
Status: DISCONTINUED | OUTPATIENT
Start: 2019-05-30 | End: 2019-05-30 | Stop reason: HOSPADM

## 2019-05-30 RX ORDER — BUPIVACAINE HYDROCHLORIDE AND EPINEPHRINE 5; 5 MG/ML; UG/ML
INJECTION, SOLUTION EPIDURAL; INTRACAUDAL; PERINEURAL
Status: DISCONTINUED | OUTPATIENT
Start: 2019-05-30 | End: 2019-05-30 | Stop reason: HOSPADM

## 2019-05-30 RX ORDER — HYDROMORPHONE HYDROCHLORIDE 1 MG/ML
0.2 INJECTION, SOLUTION INTRAMUSCULAR; INTRAVENOUS; SUBCUTANEOUS
Status: DISCONTINUED | OUTPATIENT
Start: 2019-05-30 | End: 2019-05-30 | Stop reason: HOSPADM

## 2019-05-30 RX ORDER — ONDANSETRON 2 MG/ML
INJECTION INTRAMUSCULAR; INTRAVENOUS PRN
Status: DISCONTINUED | OUTPATIENT
Start: 2019-05-30 | End: 2019-05-30 | Stop reason: SURG

## 2019-05-30 RX ORDER — HALOPERIDOL 5 MG/ML
1 INJECTION INTRAMUSCULAR
Status: DISCONTINUED | OUTPATIENT
Start: 2019-05-30 | End: 2019-05-30 | Stop reason: HOSPADM

## 2019-05-30 RX ADMIN — ONDANSETRON 4 MG: 2 INJECTION INTRAMUSCULAR; INTRAVENOUS at 14:45

## 2019-05-30 RX ADMIN — OXYCODONE HYDROCHLORIDE 10 MG: 5 SOLUTION ORAL at 15:49

## 2019-05-30 RX ADMIN — PROPOFOL 300 MG: 10 INJECTION, EMULSION INTRAVENOUS at 14:30

## 2019-05-30 RX ADMIN — LIDOCAINE HYDROCHLORIDE 0.5 ML: 10 INJECTION, SOLUTION INFILTRATION; PERINEURAL at 13:30

## 2019-05-30 RX ADMIN — FENTANYL CITRATE 100 MCG: 50 INJECTION, SOLUTION INTRAMUSCULAR; INTRAVENOUS at 14:29

## 2019-05-30 RX ADMIN — DEXAMETHASONE SODIUM PHOSPHATE 4 MG: 4 INJECTION, SOLUTION INTRAMUSCULAR; INTRAVENOUS at 14:45

## 2019-05-30 RX ADMIN — ACETAMINOPHEN 1000 MG: 500 TABLET, FILM COATED ORAL at 13:30

## 2019-05-30 RX ADMIN — CELECOXIB 400 MG: 200 CAPSULE ORAL at 13:29

## 2019-05-30 RX ADMIN — FENTANYL CITRATE 50 MCG: 50 INJECTION, SOLUTION INTRAMUSCULAR; INTRAVENOUS at 14:50

## 2019-05-30 RX ADMIN — SODIUM CHLORIDE, POTASSIUM CHLORIDE, SODIUM LACTATE AND CALCIUM CHLORIDE: 600; 310; 30; 20 INJECTION, SOLUTION INTRAVENOUS at 13:30

## 2019-05-30 RX ADMIN — MIDAZOLAM HYDROCHLORIDE 2 MG: 1 INJECTION, SOLUTION INTRAMUSCULAR; INTRAVENOUS at 14:30

## 2019-05-30 RX ADMIN — CEFAZOLIN 3 G: 1 INJECTION, POWDER, FOR SOLUTION INTRAVENOUS at 14:28

## 2019-05-30 RX ADMIN — FENTANYL CITRATE 25 MCG: 50 INJECTION INTRAMUSCULAR; INTRAVENOUS at 15:51

## 2019-05-30 RX ADMIN — KETOROLAC TROMETHAMINE 30 MG: 30 INJECTION, SOLUTION INTRAMUSCULAR at 14:45

## 2019-05-30 ASSESSMENT — PAIN SCALES - GENERAL: PAIN_LEVEL: 0

## 2019-05-30 NOTE — ANESTHESIA PROCEDURE NOTES
Airway  Date/Time: 5/30/2019 2:28 PM  Performed by: REHAN HOWARD  Authorized by: REHAN HOWARD     Location:  OR  Urgency:  Elective  Indications for Airway Management:  Anesthesia  Spontaneous Ventilation: absent    Sedation Level:  Deep  Preoxygenated: Yes    Final Airway Type:  Supraglottic airway  Final Supraglottic Airway:  Standard LMA  SGA Size:  4  Number of Attempts at Approach:  1

## 2019-05-30 NOTE — ANESTHESIA QCDR
2019 [facility] Clinical Data Registry (for Quality Improvement)     Postoperative nausea/vomiting risk protocol (Adult = 18 yrs and Pediatric 3-17 yrs)- (430 and 463)  General inhalation anesthetic (NOT TIVA) with PONV risk factors: Yes  Provision of anti-emetic therapy with at least 2 different classes of agents: Yes   Patient DID NOT receive anti-emetic therapy and reason is documented in Medical Record:  N/A    Multimodal Pain Management- (AQI59)  Patient undergoing Elective Surgery (i.e. Outpatient, or ASC, or Prescheduled Surgery prior to Hospital Admission): Yes  Use of Multimodal Pain Management, two or more drugs and/or interventions, NOT including systemic opioids: Yes   Exception: Documented allergy to multiple classes of analgesics:  N/A    PACU assessment of acute postoperative pain prior to Anesthesia Care End- Applies to Patients Age = 18- (ABG7)  Initial PACU pain score is which of the following: < 7/10  Patient unable to report pain score: N/A    Post-anesthetic transfer of care checklist/protocol to PACU/ICU- (426 and 427)  Upon conclusion of case, patient transferred to which of the following locations: PACU/Non-ICU  Use of transfer checklist/protocol:   Exclusion: Service Performed in Patient Hospital Room (and thus did not require transfer):     PACU Reintubation- (AQI31)  General anesthesia requiring endotracheal intubation (ETT) along with subsequent extubation in OR or PACU: Yes  Required reintubation in the PACU: No   Extubation was a planned trial documented in the medical record prior to removal of the original airway device:  N/A    Unplanned admission to ICU related to anesthesia service up through end of PACU care- (MD51)  Unplanned admission to ICU (not initially anticipated at anesthesia start time): No

## 2019-05-30 NOTE — ANESTHESIA TIME REPORT
Anesthesia Start and Stop Event Times     Date Time Event    5/30/2019 1428 Anesthesia Start     1515 Anesthesia Stop        Responsible Staff  05/30/19    Name Role Begin End    Ulysses Hoover M.D. Anesth 1428 1515        Preop Diagnosis (Free Text):  Pre-op Diagnosis     COMPLEX TEAR MEDIAL MENISCUS BILATERAL KNEES        Preop Diagnosis (Codes):  Diagnosis Information     Diagnosis Code(s):         Post op Diagnosis  Acute meniscal tear of knee, right, subsequent encounter  Bilateral Knee Scopes    Premium Reason  A. 3PM - 7AM    Comments: [employer]

## 2019-05-30 NOTE — DISCHARGE INSTRUCTIONS
ACTIVITY: Rest and take it easy for the first 24 hours.  A responsible adult is recommended to remain with you during that time.  It is normal to feel sleepy.  We encourage you to not do anything that requires balance, judgment or coordination.    MILD FLU-LIKE SYMPTOMS ARE NORMAL. YOU MAY EXPERIENCE GENERALIZED MUSCLE ACHES, THROAT IRRITATION, HEADACHE AND/OR SOME NAUSEA.    FOR 24 HOURS DO NOT:  Drive, operate machinery or run household appliances.  Drink beer or alcoholic beverages.   Make important decisions or sign legal documents.    SPECIAL INSTRUCTIONS: Weight bearing as tolerated. Ice and elevate.    DIET: To avoid nausea, slowly advance diet as tolerated, avoiding spicy or greasy foods for the first day.  Add more substantial food to your diet according to your physician's instructions.  INCREASE FLUIDS AND FIBER TO AVOID CONSTIPATION.    SURGICAL DRESSING/BATHING: Keep dressings on and dry for five days then may apply band-aids. Shower as per surgeon's instruction.    FOLLOW-UP APPOINTMENT: As scheduled      You should CALL YOUR PHYSICIAN if you develop:  Fever greater than 101 degrees F.  Pain not relieved by medication, or persistent nausea or vomiting.  Excessive bleeding (blood soaking through dressing) or unexpected drainage from the wound.  Extreme redness or swelling around the incision site, drainage of pus or foul smelling drainage.  Inability to urinate or empty your bladder within 8 hours.    You should call 911 if you develop problems with breathing or chest pain.    If you are unable to contact your doctor or surgical center, you should go to the nearest emergency room or urgent care center.      Physician's telephone #: Dr. Kuhn [phone]    If any questions arise, call your doctor.  If your doctor is not available, please feel free to call the Surgical Center at [phone].  The Center is open Monday through Friday from 7AM to 7PM.      You can also call the [facility] HOTLINE open  24 hours/day, 7 days/week and speak to a nurse at [phone], or toll free at [phone].    A registered nurse may call you a few days after your surgery to see how you are doing after your procedure.    MEDICATIONS: Resume taking daily medication.  Take prescribed pain medication with food.  If no medication is prescribed, you may take non-aspirin pain medication if needed.  PAIN MEDICATION CAN BE VERY CONSTIPATING.  Take a stool softener or laxative such as senokot, pericolace, or milk of magnesia if needed.    Prescription given for Norco.      Last pain medication (Oxycodone) given at 3:45 pm.    If your physician has prescribed pain medication that includes Acetaminophen (Tylenol), do not take additional Acetaminophen (Tylenol) while taking the prescribed medication.    Depression / Suicide Risk    As you are discharged from this [facility] facility, it is important to learn how to keep safe from harming yourself.    Recognize the warning signs:  · Abrupt changes in personality, positive or negative- including increase in energy   · Giving away possessions  · Change in eating patterns- significant weight changes-  positive or negative  · Change in sleeping patterns- unable to sleep or sleeping all the time   · Unwillingness or inability to communicate  · Depression  · Unusual sadness, discouragement and loneliness  · Talk of wanting to die  · Neglect of personal appearance   · Rebelliousness- reckless behavior  · Withdrawal from people/activities they love  · Confusion- inability to concentrate     If you or a loved one observes any of these behaviors or has concerns about self-harm, here's what you can do:  · Talk about it- your feelings and reasons for harming yourself  · Remove any means that you might use to hurt yourself (examples: pills, rope, extension cords, firearm)  · Get professional help from the community (Mental Health, Substance Abuse, psychological counseling)  · Do not be alone:Call  your Safe Contact- someone whom you trust who will be there for you.  · Call your local CRISIS HOTLINE 245-3385 or 371-442-2554  · Call your local Children's Mobile Crisis Response Team [place] (740) 376-5771 or www.[employer]ze  · Call the toll free National Suicide Prevention Hotlines   · National Suicide Prevention Lifeline 893-073-OGHE (7469)  · National [employer] Line Network 800-SUICIDE (032-0507)

## 2019-05-30 NOTE — ANESTHESIA POSTPROCEDURE EVALUATION
Patient: Braden Cruz    Procedure Summary     Date:  05/30/19 Room / Location:   OR 06 / SURGERY [facility]    Anesthesia Start:  1428 Anesthesia Stop:  1515    Procedures:       ARTHROSCOPY, KNEE (Bilateral Knee)      MENISCECTOMY, KNEE, MEDIAL - PARTIAL (Bilateral Knee) Diagnosis:  (COMPLEX TEAR MEDIAL MENISCUS BILATERAL KNEES)    Surgeon:  Vinayak Kuhn M.D. Responsible Provider:  Ulysses Hovoer M.D.    Anesthesia Type:  general ASA Status:  3          Final Anesthesia Type: general  Last vitals  BP   Blood Pressure: (!) 176/98    Temp   36.5 °C (97.7 °F)    Pulse   Heart Rate (Monitored): 100   Resp   20    SpO2   94 %      Anesthesia Post Evaluation    Patient location during evaluation: PACU  Patient participation: complete - patient participated  Level of consciousness: awake and alert  Pain score: 0    Airway patency: patent  Anesthetic complications: no  Cardiovascular status: hemodynamically stable  Respiratory status: acceptable  Hydration status: euvolemic    PONV: none           Nurse Pain Score: 8 (NPRS)

## 2019-05-30 NOTE — ANESTHESIA PREPROCEDURE EVALUATION
Relevant Problems   (+) Essential hypertension   (+) Type 2 diabetes mellitus without complication, without long-term current use of insulin (HCC)       Physical Exam    Airway   Mallampati: II  TM distance: >3 FB  Neck ROM: full       Cardiovascular - normal exam  Rhythm: regular  Rate: normal  (-) murmur     Dental - normal exam         Pulmonary - normal exam  Breath sounds clear to auscultation     Abdominal    Neurological - normal exam                 Anesthesia Plan    ASA 3   ASA physical status 3 criteria: diabetes - poorly controlled, hypertension - poorly controlled and morbid obesity - BMI greater than or equal to 40    Plan - general       Airway plan will be LMA        Induction: intravenous    Postoperative Plan: Postoperative administration of opioids is intended.    Pertinent diagnostic labs and testing reviewed    Informed Consent:    Anesthetic plan and risks discussed with patient.    Use of blood products discussed with: patient whom consented to blood products.

## 2019-05-30 NOTE — OR NURSING
1514: To PACU post BL knee arthroscopies. LMA dc'd on arrival. Breathing is spontaneous and unlabored. Palpable PT pulse on right, faint doppler on DP on right. Palpable DP on left. Feet are pink and warm.  1536: Pt reports pain of 5/10 on left knee, 0/10 on right.  1545: Pain increasing, see MAR.  1557: Pain is now tolerable.  1616: Meets criteria for stage ll.

## 2019-05-31 NOTE — OP REPORT
DATE OF SERVICE: 5/30/19    PREOPERATIVE DIAGNOSIS: bilateral knee medial meniscus tears.     POSTOPERATIVE DIAGNOSIS: bilateral knee medial meniscus tears.     PROCEDURE PERFORMED:  1. bilateral knee arthroscopic partial medial meniscectomy.     SURGEON: Vinayak Kuhn MD    ANESTHESIA: LMA    ANESTHESIOLOGIST: Kiko    ANTIBIOTICS: Ancef    COMPLICATIONS: None.         INDICATIONS: The patient presents with bilateral knee pain recalcitrant to conservative treatment. The patient has evidence of a medial meniscus tear. The patient was having a lot of mechanical type symptoms.  After further discussion, the patient elects to undergo a right knee scope, partial medial meniscectomy and repair as indicated.  Risks of surgery were discussed at length. All questions were answered. No guarantees were given.     PROCEDURE IN DETAIL: The patient was properly identified in the preoperative holding area, and the bilateral knees was marked as the correct surgical site. The patient was then taken back to the operative room, and placed supine on the operating room table and underwent general anesthesia. The bilateral lower extremity was sterilely prepped and draped in standard fashion. The right limb was exsanguinated and the tourniquet was inflated. A standard anterolateral portal was created. The scope was placed up the into the suprapatellar pouch. The patella showed mild wear. The trochlear groove showed advanced full thickness wear. The medial and lateral gutters were visualized, and no loose bodies were noted. The medial compartment was entered. There was evidence of a medial meniscus tear. An anterior medial portal was created. The probe was used to identify the extent of the tear. This was a complex tear involving the posterior horn, so a combination of small straight basket and a 4.0 Aggressive shaver was used to contour this back to a stable smooth edge. There was evidence of mild chondromalacia to the medial  compartment. The ACL looked good. The lateral compartment was visualized. There were no lateral compartment chondromalacia changes and no lateral meniscus tear.The scope was placed back in the suprapatellar pouch and loose fragments were removed. Excess fluid was drained. The incision was closed with 3-0 nylon. A total of 20ml of marcaine was injected. Soft dressings were applied.     The left limb was exsanguinated and the tourniquet was inflated. A standard anterolateral portal was created. The scope was placed up the into the suprapatellar pouch. The patella showed mild wear. The trochlear groove showed advanced full thickness wear. The medial and lateral gutters were visualized, and no loose bodies were noted. The medial compartment was entered. There was evidence of a medial meniscus tear. An anterior medial portal was created. The probe was used to identify the extent of the tear. This was a complex tear involving the posterior horn, so a combination of small straight basket and a 4.0 Aggressive shaver was used to contour this back to a stable smooth edge. There was evidence of mild to moderate chondromalacia to the medial compartment. The ACL looked good. The lateral compartment was visualized. There were no lateral compartment chondromalacia changes and no lateral meniscus tear.The scope was placed back in the suprapatellar pouch and loose fragments were removed. Excess fluid was drained. The incision was closed with 3-0 nylon. A total of 15ml of marcaine was injected. Soft dressings were applied. The patient was extubated and transferred to the recovery room in stable condition.

## 2019-07-15 DIAGNOSIS — G47.26 SHIFT WORK SLEEP DISORDER: ICD-10-CM

## 2019-07-15 RX ORDER — ZOLPIDEM TARTRATE 10 MG/1
10 TABLET ORAL NIGHTLY PRN
Qty: 60 TAB | Refills: 0 | Status: CANCELLED | OUTPATIENT
Start: 2019-07-15 | End: 2019-09-13

## 2019-07-15 NOTE — TELEPHONE ENCOUNTER
Due to the controlled nature of this medication and regulatory status it must be filled in an office visit. Please contact the patient to schedule an appointment for refill. You may also reroute to on site covering provider for consideration. Thank you!

## 2019-07-15 NOTE — TELEPHONE ENCOUNTER
Was the patient seen in the last year in this department? Yes    Does patient have an active prescription for medications requested?no    Received Request Via: Patient

## 2019-08-14 DIAGNOSIS — I10 ESSENTIAL HYPERTENSION: ICD-10-CM

## 2019-08-14 DIAGNOSIS — L30.9 DERMATITIS: ICD-10-CM

## 2019-08-14 NOTE — TELEPHONE ENCOUNTER
Was the patient seen in the last year in this department? Yes    Does patient have an active prescription for medications requested? No     Received Request Via: Pharmacy      Pt met protocol?: Yes, OV 4/19   BP Readings from Last 1 Encounters:   05/30/19 135/80

## 2019-08-15 RX ORDER — LOSARTAN POTASSIUM 50 MG/1
TABLET ORAL
Qty: 90 TAB | Refills: 0 | Status: SHIPPED | OUTPATIENT
Start: 2019-08-15 | End: 2019-08-27 | Stop reason: SDUPTHER

## 2019-08-15 RX ORDER — HYDROXYZINE HYDROCHLORIDE 25 MG/1
TABLET, FILM COATED ORAL
Qty: 30 TAB | Refills: 0 | Status: SHIPPED | OUTPATIENT
Start: 2019-08-15 | End: 2019-09-16 | Stop reason: SDUPTHER

## 2019-08-15 RX ORDER — AMLODIPINE BESYLATE 10 MG/1
10 TABLET ORAL DAILY
Qty: 90 TAB | Refills: 0 | Status: SHIPPED | OUTPATIENT
Start: 2019-08-15 | End: 2019-08-27 | Stop reason: SDUPTHER

## 2019-08-15 NOTE — TELEPHONE ENCOUNTER
Last seen by PCP 4/19. Will send 3 months to pharmacy. Patient is due for follow-up appointment. Please schedule.

## 2019-08-15 NOTE — TELEPHONE ENCOUNTER
Was the patient seen in the last year in this department? Yes    Does patient have an active prescription for medications requested? No     Received Request Via: Pharmacy      Pt met protocol?: Yes    LAST OV 04/01/2019    BP Readings from Last 1 Encounters:   05/30/19 135/80     Lab Results   Component Value Date/Time    SODIUM 139 05/22/2019 08:34 AM    POTASSIUM 3.7 05/22/2019 08:34 AM    CHLORIDE 103 05/22/2019 08:34 AM    CO2 28 05/22/2019 08:34 AM    GLUCOSE 188 (H) 05/22/2019 08:34 AM    BUN 11 05/22/2019 08:34 AM    CREATININE 0.73 05/22/2019 08:34 AM

## 2019-08-16 RX ORDER — AMLODIPINE BESYLATE 10 MG/1
TABLET ORAL
Qty: 90 TAB | Refills: 1 | OUTPATIENT
Start: 2019-08-16

## 2019-08-27 ENCOUNTER — HOSPITAL ENCOUNTER (OUTPATIENT)
Dept: LAB | Facility: MEDICAL CENTER | Age: 54
End: 2019-08-27
Attending: FAMILY MEDICINE
Payer: COMMERCIAL

## 2019-08-27 ENCOUNTER — OFFICE VISIT (OUTPATIENT)
Dept: MEDICAL GROUP | Facility: PHYSICIAN GROUP | Age: 54
End: 2019-08-27
Payer: COMMERCIAL

## 2019-08-27 VITALS
HEIGHT: 70 IN | DIASTOLIC BLOOD PRESSURE: 82 MMHG | OXYGEN SATURATION: 94 % | RESPIRATION RATE: 18 BRPM | WEIGHT: 305 LBS | TEMPERATURE: 98 F | HEART RATE: 78 BPM | SYSTOLIC BLOOD PRESSURE: 132 MMHG | BODY MASS INDEX: 43.67 KG/M2

## 2019-08-27 DIAGNOSIS — Z11.59 NEED FOR HEPATITIS C SCREENING TEST: ICD-10-CM

## 2019-08-27 DIAGNOSIS — I15.2 HYPERTENSION ASSOCIATED WITH DIABETES (HCC): ICD-10-CM

## 2019-08-27 DIAGNOSIS — G89.29 CHRONIC PAIN OF BOTH KNEES: ICD-10-CM

## 2019-08-27 DIAGNOSIS — E11.9 TYPE 2 DIABETES MELLITUS WITHOUT COMPLICATION, WITHOUT LONG-TERM CURRENT USE OF INSULIN (HCC): ICD-10-CM

## 2019-08-27 DIAGNOSIS — R20.2 TINGLING OF BOTH UPPER EXTREMITIES: ICD-10-CM

## 2019-08-27 DIAGNOSIS — I10 ESSENTIAL HYPERTENSION: ICD-10-CM

## 2019-08-27 DIAGNOSIS — G47.26 SHIFT WORK SLEEP DISORDER: ICD-10-CM

## 2019-08-27 DIAGNOSIS — E11.59 HYPERTENSION ASSOCIATED WITH DIABETES (HCC): ICD-10-CM

## 2019-08-27 DIAGNOSIS — M25.561 CHRONIC PAIN OF BOTH KNEES: ICD-10-CM

## 2019-08-27 DIAGNOSIS — M25.562 CHRONIC PAIN OF BOTH KNEES: ICD-10-CM

## 2019-08-27 LAB
CREAT UR-MCNC: 170.1 MG/DL
HCV AB SER QL: NEGATIVE
MICROALBUMIN UR-MCNC: 0.7 MG/DL
MICROALBUMIN/CREAT UR: 4 MG/G (ref 0–30)
VIT B12 SERPL-MCNC: 203 PG/ML (ref 211–911)

## 2019-08-27 PROCEDURE — 82607 VITAMIN B-12: CPT

## 2019-08-27 PROCEDURE — 83036 HEMOGLOBIN GLYCOSYLATED A1C: CPT

## 2019-08-27 PROCEDURE — 86803 HEPATITIS C AB TEST: CPT

## 2019-08-27 PROCEDURE — 82570 ASSAY OF URINE CREATININE: CPT

## 2019-08-27 PROCEDURE — 36415 COLL VENOUS BLD VENIPUNCTURE: CPT

## 2019-08-27 PROCEDURE — 99214 OFFICE O/P EST MOD 30 MIN: CPT | Performed by: FAMILY MEDICINE

## 2019-08-27 PROCEDURE — 82043 UR ALBUMIN QUANTITATIVE: CPT

## 2019-08-27 RX ORDER — ATORVASTATIN CALCIUM 10 MG/1
10 TABLET, FILM COATED ORAL DAILY
Qty: 90 TAB | Refills: 1 | Status: SHIPPED | OUTPATIENT
Start: 2019-08-27 | End: 2019-09-16 | Stop reason: SDUPTHER

## 2019-08-27 RX ORDER — AMLODIPINE BESYLATE 10 MG/1
10 TABLET ORAL DAILY
Qty: 90 TAB | Refills: 3 | Status: SHIPPED | OUTPATIENT
Start: 2019-08-27 | End: 2019-09-16 | Stop reason: SDUPTHER

## 2019-08-27 RX ORDER — ZOLPIDEM TARTRATE 10 MG/1
10 TABLET ORAL NIGHTLY PRN
Qty: 60 TAB | Refills: 0 | Status: SHIPPED | OUTPATIENT
Start: 2019-08-27 | End: 2019-12-09 | Stop reason: SDUPTHER

## 2019-08-27 RX ORDER — LOSARTAN POTASSIUM 50 MG/1
50 TABLET ORAL DAILY
Qty: 90 TAB | Refills: 3 | Status: SHIPPED | OUTPATIENT
Start: 2019-08-27 | End: 2019-09-16 | Stop reason: SDUPTHER

## 2019-08-27 NOTE — LETTER
August 27, 2019        Braden Cruz has a history of chronic lower back pain and a back surgery. After developing this pain, he reports having a slight limp for many months, favoring one side or the other depending where the pain in his back was most severe. He then slowly started developing pain in both knees.     It is likely that the change in gait from his back pain contributed to his knee pain and arthritis.                              Radha Castro M.D.

## 2019-08-27 NOTE — PROGRESS NOTES
cc: f/u knee pain       Subjective:     Braden Cruz is a 54 y.o. male presenting for the following:     Patient has a history of chronic lower back pain and a back surgery. After developing this pain, he did have a slight limp for some months, favoring one side or the other depending where the pain in his back was most severe. He then slowly started developing pain in both knees. He has been having a lot of problems with these and is seeing orthopedic surgery.  They are recommending bilateral knee replacements.    DMII: Patient was planning on getting bariatric surgery but he has put this on hold as he is now looking into knee replacements. He has been watching his diet but has not been exercising. Patient denies any polyuria/polydipsia, rashes, recurrent infections, changes in vision, changes in sensation.    Patient does have a history of bilateral carpal tunnel syndrome and that is flaring currently.  He is having tingling to his first 3 digits on both hands that is relieved by flicking his wrists.  He has had this before but it was not as severe.      Review of systems:  All others reviewed and are negative.       Current Outpatient Medications:   •  atorvastatin (LIPITOR) 10 MG Tab, Take 1 Tab by mouth every day., Disp: 90 Tab, Rfl: 1  •  zolpidem (AMBIEN) 10 MG Tab, Take 1 Tab by mouth at bedtime as needed for Sleep for up to 60 days., Disp: 60 Tab, Rfl: 0  •  amLODIPine (NORVASC) 10 MG Tab, Take 1 Tab by mouth every day., Disp: 90 Tab, Rfl: 3  •  losartan (COZAAR) 50 MG Tab, Take 1 Tab by mouth every day., Disp: 90 Tab, Rfl: 3  •  hydrOXYzine HCl (ATARAX) 25 MG Tab, TAKE 1 TABLET BY MOUTH THREE TIMES A DAY AS NEEDED FOR ITCHING, Disp: 30 Tab, Rfl: 0  •  ibuprofen (MOTRIN) 200 MG Tab, Take 200 mg by mouth every 6 hours as needed., Disp: , Rfl:   •  Psyllium (METAMUCIL FIBER PO), Take  by mouth every day., Disp: , Rfl:     Allergies, past medical history, past surgical history, family history,  "social history reviewed and updated    Objective:     Vitals: /82 (BP Location: Left arm, Patient Position: Sitting, BP Cuff Size: Large adult)   Pulse 78   Temp 36.7 °C (98 °F) (Temporal)   Resp 18   Ht 1.778 m (5' 10\")   Wt (!) 138.3 kg (305 lb)   SpO2 94%   BMI 43.76 kg/m²   General: Alert, pleasant, NAD  Musculoskeletal: Gait with slight limp today  Extremities: No leg edema. Knees with FROM, no warmth or swelling. Wrists without deformities or wasting.     Assessment/Plan:     Braden was seen today for numbness and pain.    Diagnoses and all orders for this visit:    Chronic pain of both knees: His back pain is currently stable and at it's baseline. It is likely that the change in gait from his back pain contributed to his knee pain and arthritis.  At this point, he does agree with the surgeon that he will need a knee replacement and he plans to go forward with this.     Type 2 diabetes mellitus without complication, without long-term current use of insulin (HCC): Patient was planning on getting evaluated for bariatric surgery but this is now been put on hold as he is getting knee replacement surgery.  He is currently on losartan daily.  Discussed statin medication with him today and he is warned of common side effect.  Will start low-dose now and patient to monitor for any side effects.  -     HEMOGLOBIN A1C; Future  -     MICROALBUMIN CREAT RATIO URINE (LAB COLLECT); Future  Other orders  -     atorvastatin (LIPITOR) 10 MG Tab; Take 1 Tab by mouth every day.    Tingling of both upper extremities: Patient with symptoms consistent with carpal tunnel syndrome bilaterally.  Will check vitamin B12 levels to ensure no deficiency.  Suggest nighttime wrist bracing.  -     VITAMIN B12; Future    Need for hepatitis C screening test  -     HEP C VIRUS ANTIBODY; Future    Shift work sleep disorder: Patient counseled on addictive nature of Ambien.  He usually uses this about twice a week, last refill given " in April.  I will refill again now.  -     zolpidem (AMBIEN) 10 MG Tab; Take 1 Tab by mouth at bedtime as needed for Sleep for up to 60 days.    Hypertension associated with diabetes (HCC): Stable without side effect on current medications.  We will refill this.  -     amLODIPine (NORVASC) 10 MG Tab; Take 1 Tab by mouth every day.  -     losartan (COZAAR) 50 MG Tab; Take 1 Tab by mouth every day.      Return in about 3 months (around 11/27/2019), or if symptoms worsen or fail to improve.

## 2019-08-28 PROBLEM — E53.8 VITAMIN B12 DEFICIENCY: Status: ACTIVE | Noted: 2019-08-28

## 2019-08-28 LAB
EST. AVERAGE GLUCOSE BLD GHB EST-MCNC: 134 MG/DL
HBA1C MFR BLD: 6.3 % (ref 0–5.6)

## 2019-09-16 DIAGNOSIS — I10 ESSENTIAL HYPERTENSION: ICD-10-CM

## 2019-09-16 DIAGNOSIS — L30.9 DERMATITIS: ICD-10-CM

## 2019-09-16 RX ORDER — HYDROXYZINE HYDROCHLORIDE 25 MG/1
TABLET, FILM COATED ORAL
Qty: 30 TAB | Refills: 1 | Status: SHIPPED | OUTPATIENT
Start: 2019-09-16 | End: 2019-09-23

## 2019-09-16 RX ORDER — ATORVASTATIN CALCIUM 10 MG/1
10 TABLET, FILM COATED ORAL DAILY
Qty: 90 TAB | Refills: 1 | Status: SHIPPED | OUTPATIENT
Start: 2019-09-16 | End: 2020-01-27

## 2019-09-16 RX ORDER — AMLODIPINE BESYLATE 10 MG/1
10 TABLET ORAL DAILY
Qty: 90 TAB | Refills: 3 | Status: SHIPPED | OUTPATIENT
Start: 2019-09-16 | End: 2020-12-22 | Stop reason: SDUPTHER

## 2019-09-16 RX ORDER — LOSARTAN POTASSIUM 50 MG/1
50 TABLET ORAL DAILY
Qty: 90 TAB | Refills: 3 | Status: SHIPPED | OUTPATIENT
Start: 2019-09-16 | End: 2020-04-28 | Stop reason: SDUPTHER

## 2019-09-16 NOTE — TELEPHONE ENCOUNTER
Was the patient seen in the last year in this department? Yes    Does patient have an active prescription for medications requested? No     Received Request Via: Pharmacy      Pt met protocol?: Yes  LAST OV 08/27/2019     BP Readings from Last 1 Encounters:   08/27/19 132/82     Lab Results   Component Value Date/Time    SODIUM 139 05/22/2019 08:34 AM    POTASSIUM 3.7 05/22/2019 08:34 AM    CHLORIDE 103 05/22/2019 08:34 AM    CO2 28 05/22/2019 08:34 AM    GLUCOSE 188 (H) 05/22/2019 08:34 AM    BUN 11 05/22/2019 08:34 AM    CREATININE 0.73 05/22/2019 08:34 AM     Lab Results   Component Value Date/Time    CHOLSTRLTOT 264 (H) 12/12/2018 10:38 AM     (H) 12/12/2018 10:38 AM    HDL 58 12/12/2018 10:38 AM    TRIGLYCERIDE 133 12/12/2018 10:38 AM       Lab Results   Component Value Date/Time    SODIUM 139 05/22/2019 08:34 AM    POTASSIUM 3.7 05/22/2019 08:34 AM    CHLORIDE 103 05/22/2019 08:34 AM    CO2 28 05/22/2019 08:34 AM    GLUCOSE 188 (H) 05/22/2019 08:34 AM    BUN 11 05/22/2019 08:34 AM    CREATININE 0.73 05/22/2019 08:34 AM     Lab Results   Component Value Date/Time    ALKPHOSPHAT 89 12/12/2018 10:38 AM    ASTSGOT 19 12/12/2018 10:38 AM    ALTSGPT 21 12/12/2018 10:38 AM    TBILIRUBIN 0.8 12/12/2018 10:38 AM

## 2019-09-22 DIAGNOSIS — L30.9 DERMATITIS: ICD-10-CM

## 2019-09-23 RX ORDER — HYDROXYZINE HYDROCHLORIDE 25 MG/1
TABLET, FILM COATED ORAL
Qty: 30 TAB | Refills: 1 | Status: SHIPPED | OUTPATIENT
Start: 2019-09-23 | End: 2019-12-09 | Stop reason: SDUPTHER

## 2019-09-23 NOTE — TELEPHONE ENCOUNTER
Was the patient seen in the last year in this department? Yes    Does patient have an active prescription for medications requested? Yes    Received Request Via: Pharmacy      Pt met protocol?: Yes   Pt last ov 8/2019 last rx sent to mail order wants this sent to lay

## 2019-10-02 ENCOUNTER — PATIENT MESSAGE (OUTPATIENT)
Dept: HEALTH INFORMATION MANAGEMENT | Facility: OTHER | Age: 54
End: 2019-10-02

## 2019-12-09 DIAGNOSIS — G47.26 SHIFT WORK SLEEP DISORDER: ICD-10-CM

## 2019-12-09 DIAGNOSIS — L30.9 DERMATITIS: ICD-10-CM

## 2019-12-09 RX ORDER — HYDROXYZINE HYDROCHLORIDE 25 MG/1
25 TABLET, FILM COATED ORAL 3 TIMES DAILY PRN
Qty: 30 TAB | Refills: 1 | Status: SHIPPED | OUTPATIENT
Start: 2019-12-09 | End: 2020-02-07 | Stop reason: SDUPTHER

## 2019-12-09 RX ORDER — ZOLPIDEM TARTRATE 10 MG/1
10 TABLET ORAL NIGHTLY PRN
Qty: 30 TAB | Refills: 0 | Status: SHIPPED | OUTPATIENT
Start: 2019-12-09 | End: 2020-01-08

## 2020-02-06 DIAGNOSIS — L30.9 DERMATITIS: ICD-10-CM

## 2020-02-06 NOTE — TELEPHONE ENCOUNTER
Received request via: Patient    Was the patient seen in the last year in this department? Yes    Does the patient have an active prescription (recently filled or refills available) for medication(s) requested? YES    Pt is requesting refill of Hydroxyzine

## 2020-02-07 RX ORDER — HYDROXYZINE HYDROCHLORIDE 25 MG/1
25 TABLET, FILM COATED ORAL 3 TIMES DAILY PRN
Qty: 90 TAB | Refills: 0 | Status: SHIPPED | OUTPATIENT
Start: 2020-02-07 | End: 2020-03-12

## 2020-02-07 NOTE — TELEPHONE ENCOUNTER
Was the patient seen in the last year in this department? Yes    Does patient have an active prescription for medications requested? No     Received Request Via: Pharmacy    Pt met protocol?: Yes     Last OV 08/27/2019

## 2020-02-25 ENCOUNTER — OFFICE VISIT (OUTPATIENT)
Dept: URGENT CARE | Facility: PHYSICIAN GROUP | Age: 55
End: 2020-02-25
Payer: COMMERCIAL

## 2020-02-25 VITALS
TEMPERATURE: 97.9 F | SYSTOLIC BLOOD PRESSURE: 142 MMHG | DIASTOLIC BLOOD PRESSURE: 78 MMHG | BODY MASS INDEX: 44.35 KG/M2 | HEART RATE: 105 BPM | RESPIRATION RATE: 16 BRPM | HEIGHT: 70 IN | WEIGHT: 309.8 LBS | OXYGEN SATURATION: 94 %

## 2020-02-25 DIAGNOSIS — M62.830 BACK MUSCLE SPASM: ICD-10-CM

## 2020-02-25 DIAGNOSIS — M54.50 ACUTE BILATERAL LOW BACK PAIN WITHOUT SCIATICA: ICD-10-CM

## 2020-02-25 PROCEDURE — 99204 OFFICE O/P NEW MOD 45 MIN: CPT | Performed by: INTERNAL MEDICINE

## 2020-02-25 RX ORDER — METHOCARBAMOL 750 MG/1
750 TABLET, FILM COATED ORAL 4 TIMES DAILY PRN
Qty: 60 TAB | Refills: 0 | Status: SHIPPED | OUTPATIENT
Start: 2020-02-25 | End: 2020-03-04

## 2020-02-25 ASSESSMENT — ENCOUNTER SYMPTOMS
NUMBNESS: 0
BOWEL INCONTINENCE: 0
PERIANAL NUMBNESS: 0
WEAKNESS: 0
TINGLING: 0
BACK PAIN: 1
ABDOMINAL PAIN: 0

## 2020-02-25 NOTE — LETTER
February 25, 2020       Patient: Braden Cruz   YOB: 1965   Date of Visit: 2/25/2020         To Whom It May Concern:    It is my medical opinion that Braden Cruz remain out of work until 2/29/20.    If you have any questions or concerns, please don't hesitate to call [phone]          Sincerely,          Kai Tomlinson M.D.  Electronically Signed

## 2020-02-26 NOTE — PROGRESS NOTES
Subjective:     Braden Cruz is a 54 y.o. male who presents for Back Pain (lower back pain, limited motion, pt was picking up rocks around house when a sudden pain shot through his back, x2 days )       Back Pain   This is a new problem. The current episode started in the past 7 days. The problem occurs constantly. The problem has been gradually worsening since onset. The pain is present in the lumbar spine. The pain is severe. Pertinent negatives include no abdominal pain, bladder incontinence, bowel incontinence, numbness, perianal numbness, tingling or weakness.     Past Medical History:   Diagnosis Date   • Hypertension    • Pain     knees     Past Surgical History:   Procedure Laterality Date   • KNEE ARTHROSCOPY Bilateral 5/30/2019    Procedure: ARTHROSCOPY, KNEE;  Surgeon: Vinayak Kuhn M.D.;  Location: SURGERY [facility];  Service: Orthopedics   • MEDIAL MENISCECTOMY Bilateral 5/30/2019    Procedure: MENISCECTOMY, KNEE, MEDIAL - PARTIAL;  Surgeon: Vinayak Kuhn M.D.;  Location: SURGERY [facility];  Service: Orthopedics   • KNEE ARTHROSCOPY Right 2006   • LUMBAR FUSION POSTERIOR  1999, 2001    x2 surgeries     Social History     Socioeconomic History   • Marital status:      Spouse name: Not on file   • Number of children: Not on file   • Years of education: Not on file   • Highest education level: Not on file   Occupational History   • Not on file   Social Needs   • Financial resource strain: Not on file   • Food insecurity     Worry: Not on file     Inability: Not on file   • Transportation needs     Medical: Not on file     Non-medical: Not on file   Tobacco Use   • Smoking status: Never Smoker   • Smokeless tobacco: Never Used   Substance and Sexual Activity   • Alcohol use: Yes     Comment: 1-2 per day   • Drug use: No   • Sexual activity: Not on file   Lifestyle   • Physical activity     Days per week: Not on file     Minutes per session: Not on file   • Stress: Not  "on file   Relationships   • Social connections     Talks on phone: Not on file     Gets together: Not on file     Attends [employer] service: Not on file     Active member of club or organization: Not on file     Attends meetings of clubs or organizations: Not on file     Relationship status: Not on file   • Intimate partner violence     Fear of current or ex partner: Not on file     Emotionally abused: Not on file     Physically abused: Not on file     Forced sexual activity: Not on file   Other Topics Concern   • Not on file   Social History Narrative   • Not on file      Family History   Problem Relation Age of Onset   • No Known Problems Mother    • Hypertension Father    • No Known Problems Sister    • No Known Problems Brother    • Heart Attack Maternal Uncle    • No Known Problems Maternal Grandmother    • Heart Attack Maternal Grandfather    • Heart Attack Paternal Grandmother    • No Known Problems Paternal Grandfather     Review of Systems   Gastrointestinal: Negative for abdominal pain and bowel incontinence.   Genitourinary: Negative for bladder incontinence.   Musculoskeletal: Positive for back pain.   Neurological: Negative for tingling, weakness and numbness.   All other systems reviewed and are negative.  No Known Allergies   Objective:   /78 (BP Location: Left arm, Patient Position: Sitting, BP Cuff Size: Large adult)   Pulse (!) 105   Temp 36.6 °C (97.9 °F) (Temporal)   Resp 16   Ht 1.778 m (5' 10\")   Wt (!) 140.5 kg (309 lb 12.8 oz)   SpO2 94%   BMI 44.45 kg/m²   Physical Exam  Constitutional:       General: He is in acute distress.      Appearance: He is well-developed.   HENT:      Head: Normocephalic and atraumatic.      Mouth/Throat:      Mouth: Mucous membranes are moist.      Pharynx: Oropharynx is clear.   Eyes:      Conjunctiva/sclera: Conjunctivae normal.   Neck:      Musculoskeletal: No neck rigidity.   Cardiovascular:      Rate and Rhythm: Normal rate and regular rhythm. "   Pulmonary:      Effort: Pulmonary effort is normal. No respiratory distress.      Breath sounds: Normal breath sounds.   Musculoskeletal:         General: Tenderness present.      Comments: Low back-surgical scars present, muscle spasm and tenderness present and no focal neurological deficit   Lymphadenopathy:      Cervical: No cervical adenopathy.   Skin:     General: Skin is warm and dry.      Capillary Refill: Capillary refill takes less than 2 seconds.   Neurological:      Mental Status: He is alert and oriented to person, place, and time.      Sensory: No sensory deficit.      Deep Tendon Reflexes: Reflexes are normal and symmetric.   Psychiatric:         Mood and Affect: Mood normal.         Behavior: Behavior normal.           Assessment/Plan:   Assessment    1. Acute bilateral low back pain without sciatica  - methocarbamol (ROBAXIN) 750 MG Tab; Take 1 Tab by mouth 4 times a day as needed for up to 8 days.  Dispense: 60 Tab; Refill: 0    2. Back muscle spasm  - methocarbamol (ROBAXIN) 750 MG Tab; Take 1 Tab by mouth 4 times a day as needed for up to 8 days.  Dispense: 60 Tab; Refill: 0    High risk conditions including disc prolapse, paraspinal abscess was considered in the differential diagnosis      Differential diagnosis, natural history, supportive care, and indications for immediate follow-up discussed.

## 2020-03-12 DIAGNOSIS — L30.9 DERMATITIS: ICD-10-CM

## 2020-03-12 NOTE — TELEPHONE ENCOUNTER
Was the patient seen in the last year in this department? Yes    Does patient have an active prescription for medications requested? No     Received Request Via: Pharmacy      Pt met protocol?: Yes, last ov 8/19

## 2020-03-13 RX ORDER — HYDROXYZINE HYDROCHLORIDE 25 MG/1
TABLET, FILM COATED ORAL
Qty: 90 TAB | Refills: 5 | Status: SHIPPED | OUTPATIENT
Start: 2020-03-13 | End: 2020-09-14 | Stop reason: SDUPTHER

## 2020-03-13 NOTE — TELEPHONE ENCOUNTER
DUE TO CHRONIC SKIN ITCHING AND DIFFICULTY WITH SLEEP, WILL REFILL FOR 6 MONTHS.     PLEASE ADDRESS ISSUE AT NEXT APPOINTMENT (NO MENTION OF IT IN 08/2019 APPT)

## 2020-03-18 ENCOUNTER — APPOINTMENT (OUTPATIENT)
Dept: URGENT CARE | Facility: PHYSICIAN GROUP | Age: 55
End: 2020-03-18
Payer: COMMERCIAL

## 2020-03-18 ENCOUNTER — OCCUPATIONAL MEDICINE (OUTPATIENT)
Dept: URGENT CARE | Facility: PHYSICIAN GROUP | Age: 55
End: 2020-03-18
Payer: COMMERCIAL

## 2020-03-18 ENCOUNTER — APPOINTMENT (OUTPATIENT)
Dept: RADIOLOGY | Facility: IMAGING CENTER | Age: 55
End: 2020-03-18
Attending: PHYSICIAN ASSISTANT
Payer: COMMERCIAL

## 2020-03-18 VITALS
DIASTOLIC BLOOD PRESSURE: 88 MMHG | HEIGHT: 70 IN | HEART RATE: 112 BPM | WEIGHT: 309.8 LBS | TEMPERATURE: 98.4 F | BODY MASS INDEX: 44.35 KG/M2 | SYSTOLIC BLOOD PRESSURE: 160 MMHG | OXYGEN SATURATION: 96 % | RESPIRATION RATE: 16 BRPM

## 2020-03-18 DIAGNOSIS — M79.601 PAIN OF RIGHT UPPER EXTREMITY: ICD-10-CM

## 2020-03-18 DIAGNOSIS — S50.01XA CONTUSION OF RIGHT ELBOW, INITIAL ENCOUNTER: ICD-10-CM

## 2020-03-18 DIAGNOSIS — I10 HYPERTENSION, UNSPECIFIED TYPE: ICD-10-CM

## 2020-03-18 DIAGNOSIS — M25.511 ACUTE PAIN OF RIGHT SHOULDER: ICD-10-CM

## 2020-03-18 PROCEDURE — 99214 OFFICE O/P EST MOD 30 MIN: CPT | Mod: 29,25 | Performed by: PHYSICIAN ASSISTANT

## 2020-03-18 PROCEDURE — 90471 IMMUNIZATION ADMIN: CPT | Performed by: PHYSICIAN ASSISTANT

## 2020-03-18 PROCEDURE — 90715 TDAP VACCINE 7 YRS/> IM: CPT | Performed by: PHYSICIAN ASSISTANT

## 2020-03-18 PROCEDURE — 73060 X-RAY EXAM OF HUMERUS: CPT | Mod: TC,RT | Performed by: PHYSICIAN ASSISTANT

## 2020-03-18 PROCEDURE — 73030 X-RAY EXAM OF SHOULDER: CPT | Mod: TC,RT | Performed by: PHYSICIAN ASSISTANT

## 2020-03-18 PROCEDURE — 73080 X-RAY EXAM OF ELBOW: CPT | Mod: TC,RT | Performed by: PHYSICIAN ASSISTANT

## 2020-03-18 RX ORDER — METHOCARBAMOL 750 MG/1
TABLET, FILM COATED ORAL
COMMUNITY
Start: 2020-02-25 | End: 2020-03-18

## 2020-03-18 RX ORDER — IBUPROFEN 600 MG/1
600 TABLET ORAL EVERY 6 HOURS PRN
Qty: 30 TAB | Refills: 0 | Status: SHIPPED
Start: 2020-03-18 | End: 2021-01-22

## 2020-03-18 RX ORDER — LOSARTAN POTASSIUM 50 MG/1
TABLET ORAL
COMMUNITY
Start: 2020-03-13 | End: 2020-03-18

## 2020-03-18 RX ORDER — KETOROLAC TROMETHAMINE 30 MG/ML
30 INJECTION, SOLUTION INTRAMUSCULAR; INTRAVENOUS ONCE
Status: COMPLETED | OUTPATIENT
Start: 2020-03-18 | End: 2020-03-18

## 2020-03-18 RX ADMIN — KETOROLAC TROMETHAMINE 30 MG: 30 INJECTION, SOLUTION INTRAMUSCULAR; INTRAVENOUS at 10:37

## 2020-03-18 ASSESSMENT — FIBROSIS 4 INDEX: FIB4 SCORE: 0.97

## 2020-03-18 ASSESSMENT — ENCOUNTER SYMPTOMS
BLURRED VISION: 0
LOSS OF CONSCIOUSNESS: 0
DOUBLE VISION: 0

## 2020-03-18 NOTE — PROGRESS NOTES
"Subjective:      Braden Cruz is a 54 y.o. male who presents with Work-Related Injury (R arm, shoulder, elbow, neck, pain, pt was pulling an when it broke and he accidentally fell on his elbow, onset 7am today )      DOI 3/18/2020 at approximately 7:30 AM: Patient states he was pulling a handle from underneath his truck when it broke and he stumbled and fell onto his elbow and his right shoulder.  Patient states that since then he felt as if he had something sticking into the back of his arm and it is very sensitive to touch.  He states he is having very significant elbow pain and it is hurting all the way up into his neck.  Patient states he has not taken anything for his pain.     HPI  Patient presents today for work-related injury as described above.  Review of Systems   Eyes: Negative for blurred vision and double vision.   Musculoskeletal:        Elbow, shoulder, neck pain   Neurological: Negative for loss of consciousness.   All other systems reviewed and are negative.     Objective:     /88 (BP Location: Left arm, Patient Position: Sitting, BP Cuff Size: Large adult)   Pulse (!) 112   Temp 36.9 °C (98.4 °F) (Temporal)   Resp 16   Ht 1.778 m (5' 10\")   Wt (!) 140.5 kg (309 lb 12.8 oz)   SpO2 96%   BMI 44.45 kg/m²      Physical Exam  Vitals signs and nursing note reviewed.   Constitutional:       General: He is not in acute distress.     Appearance: He is well-developed.   HENT:      Head: Normocephalic and atraumatic.      Right Ear: Hearing normal.      Left Ear: Hearing normal.   Eyes:      Pupils: Pupils are equal, round, and reactive to light.   Neck:     Cardiovascular:      Rate and Rhythm: Normal rate and regular rhythm.      Heart sounds: Normal heart sounds.   Pulmonary:      Effort: Pulmonary effort is normal.   Musculoskeletal:        Arms:       Comments: Right upper extremity as described below   Skin:     General: Skin is warm and dry.   Neurological:      Mental " Status: He is alert.      Coordination: Coordination normal.   Psychiatric:         Mood and Affect: Mood normal.         Patient has significant decreased range of motion in his shoulder and elbow joints.  He is in a guarded position resting his arm on his abdomen during examination.  Patient has significant tenderness to palpation to the posterior aspect of the elbow up into the upper right arm.  Patient has 4 of 5  strength in the right upper extremity.  Neurovascularly intact distally with less than 2 capillary refill.  Abrasion to right forearm.  Swelling to posterior bursa of the right elbow.  Right neck into right shoulder is extremely tense.  Patient has full range of motion of his neck.    DX SHOULDER  FINDINGS:  There is no fracture or dislocation.  The visualized osseous structures are in anatomic alignment.  Mild degenerative changes of the acromioclavicular and glenohumeral joints. There is prominent ossification along the superior aspect of the acromion which probably represents a prominent spur or could be related to old trauma. This does not appear to be   in acute fracture fragment.  Bone mineralization is age-appropriate.        IMPRESSION:     Mild degenerative changes without acute osseous abnormality.    DX ARM  FINDINGS:  There is no fracture or dislocation.  The visualized osseous structures are in anatomic alignment.  Small calcific density adjacent to the humeral head measuring 3 mm likely calcific tendinitis of the rotator cuff.  Bone mineralization is age-appropriate..        IMPRESSION:     No acute osseous abnormality.     Findings suggesting calcific tendinitis of the rotator cuff.     DX ELBOW  FINDINGS:  There is no evidence of fracture or dislocation. There is spurring of the lateral humeral epicondyle. No joint effusion is identified.     IMPRESSION:     No evidence of acute fracture or dislocation.    Abrasion was cleaned and antibiotic ointment and dressing was placed  today.  Assessment/Plan:   1. Contusion of right elbow, initial encounter  - DX-ELBOW-COMPLETE 3+ RIGHT; Future  - Tdap =>8yo IM    2. Pain of right upper extremity  - DX-HUMERUS 2+ RIGHT; Future  - Tdap =>8yo IM    3. Acute pain of right shoulder  - DX-SHOULDER 2+ RIGHT; Future  Discussed elevated blood pressure with patient and he will follow-up with primary care for reevaluation at that time.  Blood pressure is also likely acutely elevated secondary to injury.  Restrictions are specific to right upper extremity.  Given nature of patient's occupation he will likely have to avoid driving at this time given that he is in a sling and should remain in the sling during the day until his next follow-up.  Encouraged patient to ice and rest the area as much as possible.  Patient agreeable to plan  Rob Deutsch PA-C

## 2020-03-18 NOTE — LETTER
"EMPLOYEE’S CLAIM FOR COMPENSATION/ REPORT OF INITIAL TREATMENT  FORM C-4    EMPLOYEE’S CLAIM - PROVIDE ALL INFORMATION REQUESTED   First Name  Braden Last Name  Anthony Birthdate                    1965                Sex  male Claim Number   Home Address  [address] Age  54 y.o. Height  1.778 m (5' 10\") Weight  (!) 140.5 kg (309 lb 12.8 oz) [facility]     [facility] Zip  [address] Telephone  [phone] (home)    Mailing Address  [address] [facility]  [address] Primary Language Spoken  English    Insurer   Third Party    Employee's Occupation (Job Title) When Injury or Occupational Disease Occurred        Employer's Name    [employer] Telephone   [phone]   Employer Address  [address] [facility]  [address]   Date of Injury               3/18/2020  Hour of Injury  7:30 AM  Date Employer Notified  3/18/2020 Last Day of Work after Injury or Occupational Disease  3/18/2020 Supervisor to Whom Injury Reported  Pa   Address or Location of Accident (if applicable)  [place]    What were you doing at the time of accident? (if applicable)   Sliding trailer (20 ft)   How did this injury or occupational disease occur? (Be specific an answer in detail. Use additional sheet if necessary)  Sliding trailer and release  hook broke off. Momentum threw backward and landed on right elbow   If you believe that you have an occupational disease, when did you first have knowledge of the disability and it relationship to your employment? N/A   Witnesses to the Accident     N/A   Nature of Injury or Occupational Disease   fracture Part(s) of Body Injured or Affected  Right elbow    I certify that the above is true and correct to the best of my knowledge and that I have provided this information in order to obtain the benefits of [place] " and [place]  I hereby authorize any physician, chiropractor, surgeon, practitioner, or other person, any hospital, including [facility] or [facility], any medical service organization, any insurance company, or other institution or organization to release to each other, any medical or other information, including benefits paid or payable, pertinent to this injury or disease, except information relative to diagnosis, treatment and/or counseling for AIDS, psychological conditions, alcohol or controlled substances, for which I must give specific authorization.  A Photostat of this authorization shall be as valid as the original.     Date 3/18/2020   Place [facility]   Employee’s Signature   THIS REPORT MUST BE COMPLETED AND MAILED WITHIN 3 WORKING DAYS OF TREATMENT   Place  [facility]  Name of Facility  [facility]   Date  3/18/2020 Diagnosis  (S50.01XA) Contusion of right elbow, initial encounter  (M79.601) Pain of right upper extremity  (M25.511) Acute pain of right shoulder Is there evidence the injured employee was under the influence of alcohol and/or another controlled substance at the time of accident?   Hour  9:20 AM Description of Injury or Disease  Diagnoses of Contusion of right elbow, initial encounter, Pain of right upper extremity, and Acute pain of right shoulder were pertinent to this visit. No   Treatment  Restrictions are specific to right upper extremity.  Given nature of patient's occupation he will likely have to avoid driving at this time given that he is in a sling and should remain in the sling during the day until his next follow-up.  Encouraged patient to ice and rest the area as much as possible.  Have you advised the patient to remain off work five days or more? No   X-Ray Findings  Negative   If Yes   From Date  To Date      From information given by the employee,  "together with medical evidence, can you directly connect this injury or occupational disease as job incurred?  Yes If No Full Duty No Modified Duty  Yes   Is additional medical care by a physician indicated?  Yes If Modified Duty, Specify any Limitations / Restrictions  Minimal to no use of right upper extremity   Do you know of any previous injury or disease contributing to this condition or occupational disease?                            No   Date  3/18/2020 Print Doctor’s Name Dean Deutsch P.A.-C. I certify the employer’s copy of  this form was mailed on:   Address  [address] [place] [address]. [address] Insurer’s Use Only     [facility]  [address]    Provider’s Tax ID Number  [id number] Telephone  Dept: [phone]        e-DEAN Carias P.A.-C.   e-Signature: Dr. Santos Fields,   Medical Director Degree  MD        ORIGINAL-TREATING PHYSICIAN OR CHIROPRACTOR    PAGE 2-INSURER/TPA    PAGE 3-EMPLOYER    PAGE 4-EMPLOYEE             Form C-4 (rev.10/07)              BRIEF DESCRIPTION OF RIGHTS AND BENEFITS  (Pursuant to [place] 616[place])    Notice of Injury or Occupational Disease (Incident Report Form C-1): If an injury or occupational disease (OD) arises out of and in the course of employment, you must provide written notice to your employer as soon as practicable, but no later than 7 days after the accident or OD. Your employer shall maintain a sufficient supply of the required forms.    Claim for Compensation (Form C-4): If medical treatment is sought, the form C-4 is available at the place of initial treatment. A completed \"Claim for Compensation\" (Form C-4) must be filed within 90 days after an accident or OD. The treating physician or chiropractor must, within 3 working days after treatment, complete and mail to the employer, the employer's insurer and third-party , the Claim for Compensation.    Medical Treatment: If you require medical treatment for your on-the-job injury or OD, you " may be required to select a physician or chiropractor from a list provided by your workers’ compensation insurer, if it has contracted with an Organization for Managed Care (MCO) or Preferred Provider Organization (PPO) or providers of health care. If your employer has not entered into a contract with an MCO or PPO, you may select a physician or chiropractor from the Panel of Physicians and Chiropractors. Any medical costs related to your industrial injury or OD will be paid by your insurer.    Temporary Total Disability (TTD): If your doctor has certified that you are unable to work for a period of at least 5 consecutive days, or 5 cumulative days in a 20-day period, or places restrictions on you that your employer does not accommodate, you may be entitled to TTD compensation.    Temporary Partial Disability (TPD): If the wage you receive upon reemployment is less than the compensation for TTD to which you are entitled, the insurer may be required to pay you TPD compensation to make up the difference. TPD can only be paid for a maximum of 24 months.    Permanent Partial Disability (PPD): When your medical condition is stable and there is an indication of a PPD as a result of your injury or OD, within 30 days, your insurer must arrange for an evaluation by a rating physician or chiropractor to determine the degree of your PPD. The amount of your PPD award depends on the date of injury, the results of the PPD evaluation and your age and wage.    Permanent Total Disability (PTD): If you are medically certified by a treating physician or chiropractor as permanently and totally disabled and have been granted a PTD status by your insurer, you are entitled to receive monthly benefits not to exceed 66 2/3% of your average monthly wage. The amount of your PTD payments is subject to reduction if you previously received a PPD award.    Vocational Rehabilitation Services: You may be eligible for vocational rehabilitation  services if you are unable to return to the job due to a permanent physical impairment or permanent restrictions as a result of your injury or occupational disease.    Transportation and Per Chris Reimbursement: You may be eligible for travel expenses and per chris associated with medical treatment.    Reopening: You may be able to reopen your claim if your condition worsens after claim closure.    Appeal Process: If you disagree with a written determination issued by the insurer or the insurer does not respond to your request, you may appeal to the Department of Administration, , by following the instructions contained in your determination letter. You must appeal the determination within 70 days from the date of the determination letter at [address], [address], [place], [place] [address], or [address] [facility] [address], [place], [place] [address]. If you disagree with the  decision, you may appeal to the Department of Administration, . You must file your appeal within 30 days from the date of the  decision letter at [address], [address], [place], [place] [address], or [address] [facility] [address] [facility], [facility] [address], [place] [address]. If you disagree with a decision of an , you may file a petition for judicial review with the District Court. You must do so within 30 days of the Appeal Officer’s decision. You may be represented by an  at your own expense or you may contact the [facility] for possible representation.    [place]  for Injured Workers (NAIW): If you disagree with a  decision, you may request that NAIW represent you without charge at an  Hearing. For information regarding denial of benefits, you may contact the [facility] at: [address], [address], [place] [address], [phone], or [address] [address], [facility] [address], [place] [address], [phone]    To File a  Complaint with the Division: If you wish to file a complaint with the  of the Division of Industrial Relations (DIR),  please contact the Workers’ Compensation Section, [address], [address], [place], [place] [address], telephone [phone]) [phone], or [address], [address], [place], [place] [address], telephone [phone].    For assistance with Workers’ Compensation Issues: You may contact the Office of the Governor Consumer Health Assistance, [address] [place] [address], [address], [place], [place] [address], Toll Free 1-530.539.5592, Web site: http://[employer].[facility].nv.us, E-mail [email or web address].                   __________________________________________________________________                                                     _________        Employee Name / Signature                                                                                                                                              Date                                                                                                                                                                                                     D-2 (rev. 06/18)

## 2020-03-18 NOTE — LETTER
[facility]  [address] - [place] [address]  Phone:  [phone] - Fax:  [phone]   [facility] Progress Report and Disability Certification  Date of Service: 3/18/2020   No Show:  No  Date / Time of Next Visit: 3/21/2020@9:30 AM   Claim Information   Patient Name: Braden Cruz  Claim Number:     Employer:   [employer] Date of Injury:   03/18/2020   Insurer / TPA:  ID / SSN:     Occupation:   Diagnosis: Diagnoses of Contusion of right elbow, initial encounter, Pain of right upper extremity, and Acute pain of right shoulder were pertinent to this visit.    Medical Information   Related to Industrial Injury? Yes    Subjective Complaints:  DOI 3/18/2020 at approximately 7:30 AM: Patient states he was pulling a handle from underneath his truck when it broke and he stumbled and fell onto his elbow and his right shoulder.  Patient states that since then he felt as if he had something sticking into the back of his arm and it is very sensitive to touch.  He states he is having very significant elbow pain and it is hurting all the way up into his neck.  Patient states he has not taken anything for his pain.   Objective Findings: Patient has significant decreased range of motion in his shoulder and elbow joints.  He is in a guarded position resting his arm on his abdomen during examination.  Patient has significant tenderness to palpation to the posterior aspect of the elbow up into the upper right arm.  Patient has 4 of 5  strength in the right upper extremity.  Neurovascularly intact distally with less than 2 capillary refill.  Abrasion to right forearm.  Swelling to posterior bursa of the right elbow.  Right neck into right shoulder is extremely tense.  Patient has full range of motion of his neck.   Pre-Existing Condition(s):     Assessment:   Initial Visit    Status: Additional Care Required  Permanent Disability:No       Plan:      Diagnostics: X-ray    Comments:  Negative    Disability Information   Status: Released to Restricted Duty    From:  3/18/2020  Through: 3/21/2020 Restrictions are: Temporary   Physical Restrictions   Sitting:    Standing:    Stooping:    Bending:      Squatting:    Walking:    Climbin hrs/day Pushin hrs/day   Pullin hrs/day Other:    Reaching Above Shoulder (L):   Reaching Above Shoulder (R): 0 hrs/day     Reaching Below Shoulder (L):    Reaching Below Shoulder (R):  0 hrs/day   Not to exceed Weight Limits   Carrying(hrs): 0 Weight Limit(lb): < or = to 10 pounds Lifting(hrs): 0 Weight  Limit(lb): < or = to 10 pounds   Comments: Restrictions are specific to right upper extremity.  Given nature of patient's occupation he will likely have to avoid driving at this time given that he is in a sling and should remain in the sling during the day until his next follow-up.  Encouraged patient to ice and rest the area as much as possible.    Repetitive Actions   Hands: i.e. Fine Manipulations from Grasping:     Feet: i.e. Operating Foot Controls:     Driving / Operate Machinery:     Physician Name: Dean Deutsch P.A.-C. Physician Signature: DEAN Camilo P.A.-C. e-Signature: Dr. Santos Fields, Medical Director   Clinic Name / Location: [address]  [place] [address] Clinic Phone Number: Dept: [phone]   Appointment Time: 9:00 Am Visit Start Time: 9:20 AM   Check-In Time:  9:19 Am Visit Discharge Time: 11:35 AM   Original-Treating Physician or Chiropractor    Page 2-Insurer/TPA    Page 3-Employer    Page 4-Employee

## 2020-03-20 DIAGNOSIS — G47.26 SHIFT WORK SLEEP DISORDER: ICD-10-CM

## 2020-03-20 RX ORDER — ZOLPIDEM TARTRATE 10 MG/1
10 TABLET ORAL NIGHTLY PRN
Qty: 30 TAB | Refills: 0 | Status: SHIPPED | OUTPATIENT
Start: 2020-03-20 | End: 2020-04-19

## 2020-03-21 ENCOUNTER — OCCUPATIONAL MEDICINE (OUTPATIENT)
Dept: URGENT CARE | Facility: PHYSICIAN GROUP | Age: 55
End: 2020-03-21
Payer: COMMERCIAL

## 2020-03-21 VITALS
HEIGHT: 70 IN | WEIGHT: 309 LBS | OXYGEN SATURATION: 94 % | SYSTOLIC BLOOD PRESSURE: 142 MMHG | DIASTOLIC BLOOD PRESSURE: 86 MMHG | BODY MASS INDEX: 44.24 KG/M2 | HEART RATE: 91 BPM | TEMPERATURE: 97.8 F | RESPIRATION RATE: 18 BRPM

## 2020-03-21 DIAGNOSIS — S46.911D STRAIN OF RIGHT SHOULDER, SUBSEQUENT ENCOUNTER: ICD-10-CM

## 2020-03-21 DIAGNOSIS — S50.01XD CONTUSION OF RIGHT ELBOW, SUBSEQUENT ENCOUNTER: ICD-10-CM

## 2020-03-21 PROCEDURE — 99213 OFFICE O/P EST LOW 20 MIN: CPT | Performed by: PHYSICIAN ASSISTANT

## 2020-03-21 ASSESSMENT — ENCOUNTER SYMPTOMS
CHILLS: 0
FEVER: 0
MYALGIAS: 1
NAUSEA: 0
FOCAL WEAKNESS: 0
WEAKNESS: 0
SENSORY CHANGE: 0
VOMITING: 0
TINGLING: 0

## 2020-03-21 ASSESSMENT — FIBROSIS 4 INDEX: FIB4 SCORE: 0.97

## 2020-03-21 NOTE — LETTER
[facility]  [address] - [place] [address]  Phone:  [phone] - Fax:  [phone]   [facility] Progress Report and Disability Certification  Date of Service: 3/21/2020   No Show:  No  Date / Time of Next Visit: 3/25/2020@9:00 AM    Claim Information   Patient Name: Braden Cruz  Claim Number:     Employer:   [employer] Date of Injury: 3/18/2020     Insurer / TPA: Jona Beckford  ID / SSN:     Occupation:   Diagnosis: Diagnoses of Contusion of right elbow, subsequent encounter and Strain of right shoulder, subsequent encounter were pertinent to this visit.    Medical Information   Related to Industrial Injury? Yes    Subjective Complaints:  DOI: 3/18/2020. Patient is here for follow-up on right shoulder contusion and right shoulder strain. He states his symptoms have improved by 30%. He continues to have pain in right shoulder and is unable to lift his shoulder past shoulder height. He has FROM in his elbow but pain with palpation over the elbow. 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 He denies numbness or tingling. He has been taking Ibuprofen with relief. X-rays were performed of right shoulder, right elbow, and right humerus- X-rays came back negative.    Objective Findings: Vitals reviewed.    Right Shoulder: Moderate TTP in anterior right shoulder. Limited abduction past shoulder height due to pain.   Right elbow: Moderate TTP to the posterior aspect of elbow of right arm.  strength 5/5. Distal n/v intact.    Pre-Existing Condition(s):     Assessment:   Condition Improved  Comments:slight improvement    Status: Additional Care Required  Permanent Disability:No    Plan: Medication  Comments:Continue OTC ibuprofen. RICE. Continue current restrictions.    Diagnostics:      Comments:       Disability Information   Status: Released to Restricted Duty       From:  3/21/2020  Through: 3/25/2020 Restrictions are: Temporary   Physical Restrictions   Sitting:    Standing:    Stooping:    Bending:      Squatting:    Walking:    Climbin hrs/day Pushin hrs/day   Pullin hrs/day Other:    Reaching Above Shoulder (L):   Reaching Above Shoulder (R): 0 hrs/day     Reaching Below Shoulder (L):    Reaching Below Shoulder (R):  0 hrs/day   Not to exceed Weight Limits   Carrying(hrs): 0 Weight Limit(lb): < or = to 10 pounds Lifting(hrs): 0 Weight  Limit(lb): < or = to 10 pounds   Comments: Restrictions are specific to right upper extremity. Please avoid driving at this time.     Repetitive Actions   Hands: i.e. Fine Manipulations from Grasping:     Feet: i.e. Operating Foot Controls:     Driving / Operate Machinery:     Physician Name: Ambrosio Wall P.A.-C. Physician Signature: AMBROSIO Leon P.A.-C. e-Signature: Dr. Santos Fields, Medical Director   Clinic Name / Location: [address]  [place] [address] Clinic Phone Number: Dept: [phone]   Appointment Time: 9:25 Am Visit Start Time: 9:44 AM   Check-In Time:  9:23 Am Visit Discharge Time: 10:24 AM    Original-Treating Physician or Chiropractor    Page 2-Insurer/TPA    Page 3-Employer    Page 4-Employee

## 2020-03-21 NOTE — PROGRESS NOTES
Subjective:      Braden Cruz is a 54 y.o. male who presents with Arm Injury (WC FUV, R arm shoulder, elbow and neck, Shoulder is tight, elbow iis worse, pt states he is having trouble extending it )      DOI: 3/18/2020. Patient is here for follow-up on right shoulder contusion and right shoulder strain. He states his symptoms have improved by 30%. He continues to have pain in right shoulder and is unable to lift his shoulder past shoulder height. He has FROM in his elbow but pain with palpation over the elbow. 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 He denies numbness or tingling. He has been taking Ibuprofen with relief. X-rays were performed of right shoulder, right elbow, and right humerus- X-rays came back negative.      Arm Injury   Associated symptoms include myalgias. Pertinent negatives include no chills, fever, nausea, rash, vomiting or weakness.     Past Medical History:   Diagnosis Date   • Hypertension    • Pain     knees       Past Surgical History:   Procedure Laterality Date   • KNEE ARTHROSCOPY Bilateral 5/30/2019    Procedure: ARTHROSCOPY, KNEE;  Surgeon: Vinayak Kuhn M.D.;  Location: [facility];  Service: Orthopedics   • MEDIAL MENISCECTOMY Bilateral 5/30/2019    Procedure: MENISCECTOMY, KNEE, MEDIAL - PARTIAL;  Surgeon: Vinayak Kuhn M.D.;  Location: [facility];  Service: Orthopedics   • KNEE ARTHROSCOPY Right 2006   • LUMBAR FUSION POSTERIOR  1999, 2001    x2 surgeries       Family History   Problem Relation Age of Onset   • No Known Problems Mother    • Hypertension Father    • No Known Problems Sister    • No Known Problems Brother    • Heart Attack Maternal Uncle    • No Known Problems Maternal Grandmother    • Heart Attack Maternal Grandfather    • Heart Attack Paternal Grandmother    • No Known Problems Paternal Grandfather        No Known Allergies    Medications, Allergies, and current problem list  "reviewed today in Epic      Review of Systems   Constitutional: Negative for chills, fever and malaise/fatigue.   Gastrointestinal: Negative for nausea and vomiting.   Musculoskeletal: Positive for joint pain (right shoulder, right elbow) and myalgias.   Skin: Negative for rash.   Neurological: Negative for tingling, sensory change, focal weakness and weakness.     All other systems reviewed and are negative.        Objective:     /86 (BP Location: Left arm, Patient Position: Sitting, BP Cuff Size: Large adult)   Pulse 91   Temp 36.6 °C (97.8 °F) (Temporal)   Resp 18   Ht 1.778 m (5' 10\")   Wt (!) 140.2 kg (309 lb)   SpO2 94%   BMI 44.34 kg/m²      Physical Exam  Constitutional:       General: He is not in acute distress.  HENT:      Head: Normocephalic and atraumatic.   Eyes:      Conjunctiva/sclera: Conjunctivae normal.   Cardiovascular:      Rate and Rhythm: Normal rate.   Pulmonary:      Effort: Pulmonary effort is normal. No respiratory distress.   Skin:     General: Skin is warm and dry.   Neurological:      General: No focal deficit present.      Mental Status: He is alert and oriented to person, place, and time.   Psychiatric:         Mood and Affect: Mood normal.         Behavior: Behavior normal.         Thought Content: Thought content normal.         Judgment: Judgment normal.         Vitals reviewed.    Right Shoulder: Moderate TTP in anterior right shoulder. Limited abduction past shoulder height due to pain.   Right elbow: Moderate TTP to the posterior aspect of elbow of right arm.  strength 5/5. Distal n/v intact.        Assessment/Plan:       1. Contusion of right elbow, subsequent encounter    2. Strain of right shoulder, subsequent encounter    Continue current restrictions  RICE  OTC Ibuprofen     Differential diagnoses, Supportive care, and indications for immediate follow-up discussed with patient.   Instructed to return to clinic or nearest emergency department for any change " in condition, further concerns, or worsening of symptoms.    The patient demonstrated a good understanding and agreed with the treatment plan.    Ivon Wall P.A.-C.

## 2020-03-25 ENCOUNTER — OCCUPATIONAL MEDICINE (OUTPATIENT)
Dept: URGENT CARE | Facility: PHYSICIAN GROUP | Age: 55
End: 2020-03-25
Payer: COMMERCIAL

## 2020-03-25 VITALS
HEART RATE: 96 BPM | HEIGHT: 70 IN | BODY MASS INDEX: 44.24 KG/M2 | OXYGEN SATURATION: 95 % | WEIGHT: 309 LBS | DIASTOLIC BLOOD PRESSURE: 82 MMHG | SYSTOLIC BLOOD PRESSURE: 130 MMHG | TEMPERATURE: 98.4 F

## 2020-03-25 DIAGNOSIS — S46.911D STRAIN OF RIGHT SHOULDER, SUBSEQUENT ENCOUNTER: ICD-10-CM

## 2020-03-25 DIAGNOSIS — S50.01XD CONTUSION OF RIGHT ELBOW, SUBSEQUENT ENCOUNTER: ICD-10-CM

## 2020-03-25 PROCEDURE — 99213 OFFICE O/P EST LOW 20 MIN: CPT | Performed by: NURSE PRACTITIONER

## 2020-03-25 ASSESSMENT — ENCOUNTER SYMPTOMS
CHILLS: 0
BRUISES/BLEEDS EASILY: 0
SENSORY CHANGE: 0
TINGLING: 0
MYALGIAS: 1
FALLS: 0
FEVER: 0
WEAKNESS: 0

## 2020-03-25 ASSESSMENT — FIBROSIS 4 INDEX: FIB4 SCORE: 0.97

## 2020-03-25 NOTE — PROGRESS NOTES
"Subjective:      Braden Cruz is a 54 y.o. male who presents with Elbow Injury (WC FV R elbow )      DOI 3/18/20. 3rd encounter. States noyt much improvement form last visit at 30% improvement. States pain level4-5/10 with or without movement. No longer uses armsling. Presently not working. Right shoulder discomfort anteriorly. Right elbow has pain at medial and posterior aspects. Ibuprofen 600 mg and Tylenol use. No heat, no topical analgesic, no ROM exercises used. Denies numbness/tingling in right upper extremity.     HPI  PMH: No pertinent past medical history to this problem  MEDS: Medications were reviewed in Epic  ALLERGIES: Allergies were reviewed in Epic  FH: No pertinent family history to this problem         Review of Systems   Constitutional: Negative for chills, fever and malaise/fatigue.   Musculoskeletal: Positive for joint pain and myalgias. Negative for falls.   Skin: Negative for itching and rash.   Neurological: Negative for tingling, sensory change and weakness.   Endo/Heme/Allergies: Does not bruise/bleed easily.   All other systems reviewed and are negative.         Objective:     /82 (BP Location: Left arm, Patient Position: Sitting, BP Cuff Size: Large adult)   Pulse 96   Temp 36.9 °C (98.4 °F)   Ht 1.778 m (5' 10\")   Wt (!) 140.2 kg (309 lb)   SpO2 95%   BMI 44.34 kg/m²      Physical Exam  Vitals signs reviewed.   Constitutional:       General: He is awake. He is not in acute distress.     Appearance: Normal appearance. He is well-developed. He is not ill-appearing, toxic-appearing or diaphoretic.   HENT:      Head: Normocephalic.   Eyes:      Pupils: Pupils are equal, round, and reactive to light.   Cardiovascular:      Rate and Rhythm: Normal rate.   Pulmonary:      Effort: Pulmonary effort is normal.   Musculoskeletal:      Right shoulder: He exhibits tenderness. He exhibits normal range of motion, no bony tenderness, no swelling, no effusion, no crepitus, no " deformity, no pain, no spasm, normal pulse and normal strength.      Right elbow: He exhibits normal range of motion, no swelling, no effusion and no deformity. Tenderness found. Medial epicondyle tenderness noted.        Arms:    Skin:     General: Skin is warm and dry.      Findings: Ecchymosis and wound present. No erythema or rash.          Neurological:      Mental Status: He is alert and oriented to person, place, and time.   Psychiatric:         Behavior: Behavior is cooperative.         A/O x 3. Skin p/w/d, skin sensation intact. FROM right shoulder but has discomfort at anterior aspect of shoulder joint with mild TTP, no bruising. Able to lift arm above shoulder height with some discomfort. TTP at medial aspect of right elbow region above and below joint. FROM of right elbow. Ecchymosis seen. Scab at posterior aspect of inferior elbow joint with TTP at site. Equal upper extremity strength.        Assessment/Plan:       1. Contusion of right elbow, subsequent encounter      2. Strain of right shoulder, subsequent encounter    -May continue to restrict push/pull, lift/carry <10# of right upper extremity until seen on 3/30/20 then may have trial full duty, discussed this with patient as he requested this  -May continue Ibuprofen/Tylenol as instructed for pain  -May use heat application 5-10 minutes, 2-3x/day as needed for muscle/joint stiffness  -May use topical analgesic like Arnica/Salon Pas as needed for areas or bruising and localized pain relief   -May perform small range of motion exercises as tolerated to prevent stiffnes in shoulder/elbow joints  -Recheck on 3/30/20 for possible full duty trial

## 2020-03-25 NOTE — LETTER
[facility]  [address] - [place] [address]  Phone:  [phone] - Fax:  [phone]   [facility] Progress Report and Disability Certification  Date of Service: 3/25/2020   No Show:  No  Date / Time of Next Visit: 3/30/2020@ 9:30AM   Claim Information   Patient Name: Braden Cruz  Claim Number:     Employer:   [employer] Date of Injury: 3/18/2020     Insurer / TPA: Jona Beckford  ID / SSN:     Occupation:   Diagnosis: Diagnoses of Contusion of right elbow, subsequent encounter and Strain of right shoulder, subsequent encounter were pertinent to this visit.    Medical Information   Related to Industrial Injury? Yes    Subjective Complaints:  DOI 3/18/20. 3rd encounter. States noyt much improvement form last visit at 30% improvement. States pain level4-5/10 with or without movement. No longer uses armsling. Presently not working. Right shoulder discomfort anteriorly. Right elbow has pain at medial and posterior aspects. Ibuprofen 600 mg and Tylenol use. No heat, no topical analgesic, no ROM exercises used. Denies numbness/tingling in right upper extremity.   Objective Findings: A/O x 3. Skin p/w/d, skin sensation intact. FROM right shoulder but has discomfort at anterior aspect of shoulder joint with mild TTP, no bruising. Able to lift arm above shoulder height with some discomfort. TTP at medial aspect of right elbow region above and below joint. FROM of right elbow. Ecchymosis seen. Scab at posterior aspect of inferior elbow joint with TTP at site. Equal upper extremity strength.    Pre-Existing Condition(s):     Assessment:   Condition Same    Status: Additional Care Required  Permanent Disability:No    Plan:      Diagnostics:      Comments:       Disability Information   Status: Released to Restricted Duty    From:  3/25/2020  Through: 3/30/2020 Restrictions are: Temporary   Physical Restrictions    Sitting:    Standing:    Stooping:    Bending:      Squatting:    Walking:    Climbing:    Pushin hrs/day   Pullin hrs/day Other:    Reaching Above Shoulder (L):   Reaching Above Shoulder (R):       Reaching Below Shoulder (L):    Reaching Below Shoulder (R):      Not to exceed Weight Limits   Carrying(hrs):   Weight Limit(lb): < or = to 10 pounds Lifting(hrs):   Weight  Limit(lb): < or = to 10 pounds   Comments: -May continue to restrict push/pull, lift/carry <10# of right upper extremity until seen on 3/30/20 then may have trial full duty, discussed this with patient as he requested this  -May continue Ibuprofen/Tylenol as instructed for pain  -May use heat application 5-10 minutes, 2-3x/day as needed for muscle/joint stiffness  -May use topical analgesic like Arnica/Salon Pas as needed for areas or bruising and localized pain relief   -May perform small range of motion exercises as tolerated to prevent stiffnes in shoulder/elbow joints  -Recheck on 3/30/20 for possible full duty trial    Repetitive Actions   Hands: i.e. Fine Manipulations from Grasping:     Feet: i.e. Operating Foot Controls:     Driving / Operate Machinery:     Physician Name: ABI Mcdaniel Physician Signature: DELMAR Arrieta e-Signature: Dr. Santos Fields, Medical Director   Clinic Name / Location: [address]  [place] [address] Clinic Phone Number: Dept: [phone]   Appointment Time: 9:00 Am Visit Start Time: 8:54 AM   Check-In Time:  8:51 Am Visit Discharge Time: 9:37 AM   Original-Treating Physician or Chiropractor    Page 2-Insurer/TPA    Page 3-Employer    Page 4-Employee

## 2020-03-30 ENCOUNTER — OCCUPATIONAL MEDICINE (OUTPATIENT)
Dept: URGENT CARE | Facility: PHYSICIAN GROUP | Age: 55
End: 2020-03-30
Payer: COMMERCIAL

## 2020-03-30 VITALS
WEIGHT: 308.6 LBS | BODY MASS INDEX: 44.18 KG/M2 | TEMPERATURE: 98.1 F | HEIGHT: 70 IN | RESPIRATION RATE: 16 BRPM | DIASTOLIC BLOOD PRESSURE: 78 MMHG | SYSTOLIC BLOOD PRESSURE: 134 MMHG | OXYGEN SATURATION: 94 % | HEART RATE: 96 BPM

## 2020-03-30 DIAGNOSIS — S46.911D STRAIN OF RIGHT SHOULDER, SUBSEQUENT ENCOUNTER: ICD-10-CM

## 2020-03-30 DIAGNOSIS — S50.01XD CONTUSION OF RIGHT ELBOW, SUBSEQUENT ENCOUNTER: ICD-10-CM

## 2020-03-30 PROCEDURE — 99213 OFFICE O/P EST LOW 20 MIN: CPT | Performed by: NURSE PRACTITIONER

## 2020-03-30 ASSESSMENT — ENCOUNTER SYMPTOMS
BRUISES/BLEEDS EASILY: 0
FALLS: 0
MYALGIAS: 0
CHILLS: 0
WEAKNESS: 0
FEVER: 0
TINGLING: 0
SENSORY CHANGE: 0

## 2020-03-30 ASSESSMENT — FIBROSIS 4 INDEX: FIB4 SCORE: 0.97

## 2020-03-30 NOTE — PROGRESS NOTES
"Subjective:      Braden Cruz is a 54 y.o. male who presents with Work-Related Injury (R elbow/shoulder, pt states that they feel better, slight pain but overall better )      DOI 3/18/20. 4th encounter. States shoulder and elbow feel much better. States ready to go back to work. Has been performing shoulder stretches. States mild TTP at olecranon process. Bruising has improved. No pain or difficulty with shoulder/elbow movement. Ice/heat.      HPI  PMH: No pertinent past medical history to this problem  MEDS: Medications were reviewed in Epic  ALLERGIES: Allergies were reviewed in Epic  FH: No pertinent family history to this problem         Review of Systems   Constitutional: Negative for chills, fever and malaise/fatigue.   Musculoskeletal: Negative for falls, joint pain and myalgias.   Skin: Negative for itching and rash.   Neurological: Negative for tingling, sensory change and weakness.   Endo/Heme/Allergies: Does not bruise/bleed easily.   All other systems reviewed and are negative.         Objective:     /78 (BP Location: Left arm, Patient Position: Sitting, BP Cuff Size: Large adult)   Pulse 96   Temp 36.7 °C (98.1 °F) (Temporal)   Resp 16   Ht 1.778 m (5' 10\")   Wt (!) 140 kg (308 lb 9.6 oz)   SpO2 94%   BMI 44.28 kg/m²      Physical Exam  Vitals signs reviewed.   Constitutional:       General: He is awake. He is not in acute distress.     Appearance: Normal appearance. He is well-developed. He is not ill-appearing, toxic-appearing or diaphoretic.   HENT:      Head: Normocephalic.   Eyes:      Pupils: Pupils are equal, round, and reactive to light.   Cardiovascular:      Rate and Rhythm: Normal rate.   Pulmonary:      Effort: Pulmonary effort is normal.   Musculoskeletal:      Right shoulder: He exhibits normal range of motion, no tenderness, no bony tenderness, no swelling, no effusion, no crepitus, no deformity, no pain, no spasm, normal pulse and normal strength.      Right " elbow: He exhibits normal range of motion, no swelling, no effusion, no deformity and no laceration. Tenderness found. Olecranon process tenderness noted.   Skin:     General: Skin is warm and dry.      Findings: No erythema or rash.   Neurological:      Mental Status: He is alert and oriented to person, place, and time.   Psychiatric:         Behavior: Behavior is cooperative.         A/O x 3. Skin sensation intact. Skin p/w/d. FROM right shoulder joint. No TTP. FROM right elbow joint. No bruising of skin. Mild TTP at olecranon process. Equal arm strength.        Assessment/Plan:       1. Contusion of right elbow, subsequent encounter      2. Strain of right shoulder, subsequent encounter    Patient to have trial full duty without restriction  Return on 4/4/20 for probable Discharge/MMI

## 2020-03-30 NOTE — LETTER
[facility]  [address] - [place] [address]  Phone:  [phone] - Fax:  [phone]   [facility] Progress Report and Disability Certification  Date of Service: 3/30/2020   No Show:  No  Date / Time of Next Visit: 4/4/2020@9:30AM   Claim Information   Patient Name: Braden Cruz  Claim Number:     Employer:   [employer] Date of Injury: 3/18/2020     Insurer / TPA: Jona Beckford  ID / SSN:     Occupation:   Diagnosis: Diagnoses of Contusion of right elbow, subsequent encounter and Strain of right shoulder, subsequent encounter were pertinent to this visit.    Medical Information   Related to Industrial Injury? Yes    Subjective Complaints:  DOI 3/18/20. 4th encounter. States shoulder and elbow feel much better. States ready to go back to work. Has been performing shoulder stretches. States mild TTP at olecranon process. Bruising has improved. No pain or difficulty with shoulder/elbow movement. Ice/heat.    Objective Findings: A/O x 3. Skin sensation intact. Skin p/w/d. FROM right shoulder joint. No TTP. FROM right elbow joint. No bruising of skin. Mild TTP at olecranon process. Equal arm strength.    Pre-Existing Condition(s):     Assessment:   Condition Improved    Status: Additional Care Required  Permanent Disability:No    Plan:      Diagnostics:      Comments:       Disability Information   Status: Released to Full Duty    From:  3/30/2020  Through: 4/4/2020 Restrictions are:     Physical Restrictions   Sitting:    Standing:    Stooping:    Bending:      Squatting:    Walking:    Climbing:    Pushing:      Pulling:    Other:    Reaching Above Shoulder (L):   Reaching Above Shoulder (R):       Reaching Below Shoulder (L):    Reaching Below Shoulder (R):      Not to exceed Weight Limits   Carrying(hrs):   Weight Limit(lb):   Lifting(hrs):   Weight  Limit(lb):     Comments: Patient to have trial full duty without  restriction  Return on 4/4/20 for probable Discharge/MMI    Repetitive Actions   Hands: i.e. Fine Manipulations from Grasping:     Feet: i.e. Operating Foot Controls:     Driving / Operate Machinery:     Physician Name: ABI Mcdaniel Physician Signature: DELMAR Arrieta e-Signature: Dr. Santos Fields, Medical Director   Clinic Name / Location: [address]  [place] [address] Clinic Phone Number: Dept: [phone]   Appointment Time: 9:40 Am Visit Start Time: 9:44 AM   Check-In Time:  9:38 Am Visit Discharge Time:  10:29AM   Original-Treating Physician or Chiropractor    Page 2-Insurer/TPA    Page 3-Employer    Page 4-Employee

## 2020-04-04 ENCOUNTER — OCCUPATIONAL MEDICINE (OUTPATIENT)
Dept: URGENT CARE | Facility: PHYSICIAN GROUP | Age: 55
End: 2020-04-04
Payer: COMMERCIAL

## 2020-04-04 VITALS
HEART RATE: 90 BPM | SYSTOLIC BLOOD PRESSURE: 140 MMHG | TEMPERATURE: 97.8 F | HEIGHT: 70 IN | BODY MASS INDEX: 44.18 KG/M2 | OXYGEN SATURATION: 95 % | RESPIRATION RATE: 16 BRPM | DIASTOLIC BLOOD PRESSURE: 89 MMHG | WEIGHT: 308.6 LBS

## 2020-04-04 DIAGNOSIS — S46.911D STRAIN OF RIGHT SHOULDER, SUBSEQUENT ENCOUNTER: ICD-10-CM

## 2020-04-04 DIAGNOSIS — S50.01XD CONTUSION OF RIGHT ELBOW, SUBSEQUENT ENCOUNTER: Primary | ICD-10-CM

## 2020-04-04 PROCEDURE — 99214 OFFICE O/P EST MOD 30 MIN: CPT | Performed by: PHYSICIAN ASSISTANT

## 2020-04-04 ASSESSMENT — ENCOUNTER SYMPTOMS
VOMITING: 0
FEVER: 0
DIARRHEA: 0
SHORTNESS OF BREATH: 0
COUGH: 0
NAUSEA: 0
CONSTIPATION: 0
CHILLS: 0
ABDOMINAL PAIN: 0

## 2020-04-04 ASSESSMENT — FIBROSIS 4 INDEX: FIB4 SCORE: 0.97

## 2020-04-04 NOTE — PROGRESS NOTES
"Braden Cruz is a 54 y.o. male who presents for Follow-Up ( follow up, feels much better has already been back and working )    DOI 3/18/2020 at approximately 7:30 AM: Patient states he was pulling a handle from underneath his truck when it broke and he stumbled and fell onto his elbow and his right shoulder.  Patient states that since then he felt as if he had something sticking into the back of his arm and it is very sensitive to touch.  He states he is having very significant elbow pain and it is hurting all the way up into his neck.  Patient states he has not taken anything for his pain.     F/u 4/4/20: Patient's pain has improved.  Pain is described as 1/10.  He has been back to work full duty without complication.  He takes ibuprofen intermittently.  Swelling and abrasion have improved.  No numbness or tingling.  The patient is ready to be released MMI.   HPI  Review of Systems   Constitutional: Negative for chills, fever and malaise/fatigue.   Respiratory: Negative for cough and shortness of breath.    Gastrointestinal: Negative for abdominal pain, constipation, diarrhea, nausea and vomiting.   Musculoskeletal: Positive for joint pain.        Positive arm pain   All other systems reviewed and are negative.        PMH: No pertinent past medical history to this problem  MEDS: Medications were reviewed in Epic  ALLERGIES: Allergies were reviewed in Epic  SOCHX: Works as   FH: No pertinent family history to this problem     Objective:   /89 (BP Location: Right arm, Patient Position: Sitting, BP Cuff Size: Adult long)   Pulse 90   Temp 36.6 °C (97.8 °F) (Temporal)   Resp 16   Ht 1.778 m (5' 10\")   Wt (!) 140 kg (308 lb 9.6 oz)   SpO2 95%   BMI 44.28 kg/m²     Physical Exam  Vitals signs reviewed.   Constitutional:       General: He is not in acute distress.     Appearance: He is well-developed.   HENT:      Head: Normocephalic and atraumatic.      Right Ear: External ear normal. "      Left Ear: External ear normal.      Nose: Nose normal.      Mouth/Throat:      Mouth: Mucous membranes are moist.   Eyes:      Conjunctiva/sclera: Conjunctivae normal.      Pupils: Pupils are equal, round, and reactive to light.   Neck:      Musculoskeletal: Normal range of motion and neck supple.      Trachea: No tracheal deviation.   Cardiovascular:      Rate and Rhythm: Normal rate and regular rhythm.   Pulmonary:      Effort: Pulmonary effort is normal.      Breath sounds: Normal breath sounds.   Musculoskeletal:      Right elbow: He exhibits normal range of motion, no swelling, no effusion and no deformity. No tenderness found. No radial head tenderness noted.      Comments: Right arm: Well-healing abrasion over right elbow.  No discharge or fluctuance.  ROM intact.  Negative Neer's, Jacobson Roger, empty can test.  Distal N/V intact.   Skin:     General: Skin is warm and dry.      Capillary Refill: Capillary refill takes less than 2 seconds.   Neurological:      General: No focal deficit present.      Mental Status: He is alert and oriented to person, place, and time.   Psychiatric:         Mood and Affect: Mood normal.         Behavior: Behavior normal.          Assessment/Plan:     1. Contusion of right elbow, subsequent encounter     2. Strain of right shoulder, subsequent encounter       Patient released MMI, D 39 provided.  Continue ibuprofen 600 mg as needed for pain.    If symptoms worsen or persist patient can return to clinic for reevaluation. Patient confirmed understanding of information.     Please note that this dictation was created using voice recognition software. I have made every reasonable attempt to correct obvious errors, but I expect that there are errors of grammar and possibly content that I did not discover before finalizing the note.

## 2020-04-04 NOTE — LETTER
[facility]  [address] - [place] [address]  Phone:  [phone] - Fax:  [phone]   [facility] Progress Report and Disability Certification  Date of Service: 4/4/2020   No Show:  No  Date / Time of Next Visit:     Claim Information   Patient Name: Braden Cruz  Claim Number:     Employer:   [employer] Date of Injury: 3/18/2020     Insurer / TPA: Jona Beckford  ID / SSN:     Occupation:   Diagnosis: The primary encounter diagnosis was Contusion of right elbow, subsequent encounter. A diagnosis of Strain of right shoulder, subsequent encounter was also pertinent to this visit.    Medical Information   Related to Industrial Injury? Yes    Subjective Complaints:  DOI 3/18/2020 at approximately 7:30 AM: Patient states he was pulling a handle from underneath his truck when it broke and he stumbled and fell onto his elbow and his right shoulder.  Patient states that since then he felt as if he had something sticking into the back of his arm and it is very sensitive to touch.  He states he is having very significant elbow pain and it is hurting all the way up into his neck.  Patient states he has not taken anything for his pain.     F/u 4/4/20: Patient's pain has improved.  Pain is described as 1/10.  He has been back to work full duty without complication.  He takes ibuprofen intermittently.  Swelling and abrasion have improved.  No numbness or tingling.  The patient is ready to be released MMI.   Objective Findings: Physical Exam  Vitals signs reviewed.   Constitutional:       General: He is not in acute distress.     Appearance: He is well-developed.   HENT:      Head: Normocephalic and atraumatic.      Right Ear: External ear normal.      Left Ear: External ear normal.      Nose: Nose normal.      Mouth/Throat:      Mouth: Mucous membranes are moist.   Eyes:      Conjunctiva/sclera: Conjunctivae normal.      Pupils:  Pupils are equal, round, and reactive to light.   Neck:      Musculoskeletal: Normal range of motion and neck supple.      Trachea: No tracheal deviation.   Cardiovascular:      Rate and Rhythm: Normal rate and regular rhythm.   Pulmonary:      Effort: Pulmonary effort is normal.      Breath sounds: Normal breath sounds.   Musculoskeletal:      Right elbow: He exhibits normal range of motion, no swelling, no effusion and no deformity. No tenderness found. No radial head tenderness noted.      Comments: Right arm: Well-healing abrasion over right elbow.  No discharge or fluctuance.  ROM intact.  Negative Neer's, Jacobson Roger, empty can test.  Distal N/V intact.   Skin:     General: Skin is warm and dry.      Capillary Refill: Capillary refill takes less than 2 seconds.   Neurological:      General: No focal deficit present.      Mental Status: He is alert and oriented to person, place, and time.   Psychiatric:         Mood and Affect: Mood normal.         Behavior: Behavior normal.    Pre-Existing Condition(s):     Assessment:   Initial Visit    Status: Discharged /  MMI  Permanent Disability:No    Plan: Medication    Diagnostics: X-ray    Comments:       Disability Information   Status: Released to Full Duty    From:  4/4/2020  Through:   Restrictions are:     Physical Restrictions   Sitting:    Standing:    Stooping:    Bending:      Squatting:    Walking:    Climbing:    Pushing:      Pulling:    Other:    Reaching Above Shoulder (L):   Reaching Above Shoulder (R):       Reaching Below Shoulder (L):    Reaching Below Shoulder (R):      Not to exceed Weight Limits   Carrying(hrs):   Weight Limit(lb):   Lifting(hrs):   Weight  Limit(lb):     Comments:      Repetitive Actions   Hands: i.e. Fine Manipulations from Grasping:     Feet: i.e. Operating Foot Controls:     Driving / Operate Machinery:     Physician Name: Olivia Sims P.A.-C. Physician Signature: OLIVIA Kraus P.A.-C. e-Signature: Dr. Graham  Diamond, Medical Director   Clinic Name / Location: [address]  [place] [address] Clinic Phone Number: Dept: [phone]   Appointment Time: 9:30 Am Visit Start Time: 9:30 AM   Check-In Time:  9:28 Am Visit Discharge Time: 10:17 AM    Original-Treating Physician or Chiropractor    Page 2-Insurer/TPA    Page 3-Employer    Page 4-Employee

## 2020-04-20 NOTE — TELEPHONE ENCOUNTER
Was the patient seen in the last year in this department? Yes    Does patient have an active prescription for medications requested? No     Received Request Via: Pharmacy      Pt met protocol?: No, OV 8/19, labs need to be done   Lab Results   Component Value Date/Time    CHOLSTRLTOT 264 (H) 12/12/2018 10:38 AM     (H) 12/12/2018 10:38 AM    HDL 58 12/12/2018 10:38 AM    TRIGLYCERIDE 133 12/12/2018 10:38 AM       Lab Results   Component Value Date/Time    SODIUM 139 05/22/2019 08:34 AM    POTASSIUM 3.7 05/22/2019 08:34 AM    CHLORIDE 103 05/22/2019 08:34 AM    CO2 28 05/22/2019 08:34 AM    GLUCOSE 188 (H) 05/22/2019 08:34 AM    BUN 11 05/22/2019 08:34 AM    CREATININE 0.73 05/22/2019 08:34 AM     Lab Results   Component Value Date/Time    ALKPHOSPHAT 89 12/12/2018 10:38 AM    ASTSGOT 19 12/12/2018 10:38 AM    ALTSGPT 21 12/12/2018 10:38 AM    TBILIRUBIN 0.8 12/12/2018 10:38 AM

## 2020-04-21 RX ORDER — ATORVASTATIN CALCIUM 10 MG/1
TABLET, FILM COATED ORAL
Qty: 90 TAB | Refills: 0 | Status: SHIPPED | OUTPATIENT
Start: 2020-04-21 | End: 2020-07-16

## 2020-04-28 DIAGNOSIS — I10 ESSENTIAL HYPERTENSION: ICD-10-CM

## 2020-04-28 RX ORDER — LOSARTAN POTASSIUM 50 MG/1
50 TABLET ORAL DAILY
Qty: 90 TAB | Refills: 1 | Status: SHIPPED | OUTPATIENT
Start: 2020-04-28 | End: 2020-12-22 | Stop reason: SDUPTHER

## 2020-04-28 NOTE — TELEPHONE ENCOUNTER
Received request via: Patient    Was the patient seen in the last year in this department? Yes LOV 08/27/2019    Does the patient have an active prescription (recently filled or refills available) for medication(s) requested? No     Pt is out of medication

## 2020-04-28 NOTE — TELEPHONE ENCOUNTER
Refill X 6 months, sent to pharmacy.Pt. Seen in the last 6 months per protocol.   Lab Results   Component Value Date/Time    SODIUM 139 05/22/2019 08:34 AM    POTASSIUM 3.7 05/22/2019 08:34 AM    CHLORIDE 103 05/22/2019 08:34 AM    CO2 28 05/22/2019 08:34 AM    GLUCOSE 188 (H) 05/22/2019 08:34 AM    BUN 11 05/22/2019 08:34 AM    CREATININE 0.73 05/22/2019 08:34 AM

## 2020-08-12 DIAGNOSIS — G47.26 SHIFT WORK SLEEP DISORDER: ICD-10-CM

## 2020-08-12 RX ORDER — ZOLPIDEM TARTRATE 10 MG/1
10 TABLET ORAL NIGHTLY PRN
Qty: 30 TAB | Refills: 0 | OUTPATIENT
Start: 2020-08-12 | End: 2020-09-11

## 2020-09-14 DIAGNOSIS — L30.9 DERMATITIS: ICD-10-CM

## 2020-09-14 RX ORDER — HYDROXYZINE HYDROCHLORIDE 25 MG/1
TABLET, FILM COATED ORAL
Qty: 90 TAB | Refills: 1 | Status: SHIPPED | OUTPATIENT
Start: 2020-09-14 | End: 2020-12-21

## 2020-12-22 DIAGNOSIS — L30.9 DERMATITIS: ICD-10-CM

## 2020-12-22 DIAGNOSIS — I10 ESSENTIAL HYPERTENSION: ICD-10-CM

## 2020-12-22 RX ORDER — LOSARTAN POTASSIUM 50 MG/1
50 TABLET ORAL DAILY
Qty: 90 TAB | Refills: 0 | Status: SHIPPED | OUTPATIENT
Start: 2020-12-22 | End: 2021-01-22 | Stop reason: SDUPTHER

## 2020-12-22 RX ORDER — ATORVASTATIN CALCIUM 10 MG/1
TABLET, FILM COATED ORAL
Qty: 90 TAB | Refills: 0 | Status: SHIPPED | OUTPATIENT
Start: 2020-12-22 | End: 2020-12-29 | Stop reason: SDUPTHER

## 2020-12-22 RX ORDER — AMLODIPINE BESYLATE 10 MG/1
10 TABLET ORAL DAILY
Qty: 90 TAB | Refills: 0 | Status: SHIPPED | OUTPATIENT
Start: 2020-12-22 | End: 2021-01-22 | Stop reason: SDUPTHER

## 2020-12-31 RX ORDER — ATORVASTATIN CALCIUM 10 MG/1
TABLET, FILM COATED ORAL
Qty: 90 TAB | Refills: 0 | Status: SHIPPED | OUTPATIENT
Start: 2020-12-31 | End: 2021-01-14 | Stop reason: SDUPTHER

## 2021-01-18 RX ORDER — ATORVASTATIN CALCIUM 10 MG/1
TABLET, FILM COATED ORAL
Qty: 90 TAB | Refills: 0 | Status: SHIPPED | OUTPATIENT
Start: 2021-01-18 | End: 2021-01-22 | Stop reason: SDUPTHER

## 2021-01-22 ENCOUNTER — TELEMEDICINE (OUTPATIENT)
Dept: MEDICAL GROUP | Facility: MEDICAL CENTER | Age: 56
End: 2021-01-22
Payer: COMMERCIAL

## 2021-01-22 ENCOUNTER — TELEPHONE (OUTPATIENT)
Dept: MEDICAL GROUP | Facility: MEDICAL CENTER | Age: 56
End: 2021-01-22

## 2021-01-22 VITALS — RESPIRATION RATE: 18 BRPM | WEIGHT: 300 LBS | HEIGHT: 70 IN | BODY MASS INDEX: 42.95 KG/M2

## 2021-01-22 DIAGNOSIS — Z12.12 SCREENING FOR COLORECTAL CANCER: ICD-10-CM

## 2021-01-22 DIAGNOSIS — E53.8 VITAMIN B12 DEFICIENCY: ICD-10-CM

## 2021-01-22 DIAGNOSIS — I10 ESSENTIAL HYPERTENSION: ICD-10-CM

## 2021-01-22 DIAGNOSIS — Z12.11 SCREENING FOR COLORECTAL CANCER: ICD-10-CM

## 2021-01-22 DIAGNOSIS — E11.9 TYPE 2 DIABETES MELLITUS WITHOUT COMPLICATION, WITHOUT LONG-TERM CURRENT USE OF INSULIN (HCC): ICD-10-CM

## 2021-01-22 DIAGNOSIS — E78.5 DYSLIPIDEMIA: ICD-10-CM

## 2021-01-22 DIAGNOSIS — L30.9 DERMATITIS: ICD-10-CM

## 2021-01-22 DIAGNOSIS — Z00.00 ROUTINE GENERAL MEDICAL EXAMINATION AT A HEALTH CARE FACILITY: ICD-10-CM

## 2021-01-22 PROBLEM — E78.2 MIXED HYPERLIPIDEMIA: Status: RESOLVED | Noted: 2018-09-12 | Resolved: 2021-01-22

## 2021-01-22 PROBLEM — I15.2 HYPERTENSION ASSOCIATED WITH DIABETES (HCC): Status: RESOLVED | Noted: 2017-09-11 | Resolved: 2021-01-22

## 2021-01-22 PROBLEM — E11.59 HYPERTENSION ASSOCIATED WITH DIABETES (HCC): Status: RESOLVED | Noted: 2017-09-11 | Resolved: 2021-01-22

## 2021-01-22 PROCEDURE — 99214 OFFICE O/P EST MOD 30 MIN: CPT | Mod: 95,CR | Performed by: NURSE PRACTITIONER

## 2021-01-22 RX ORDER — ATORVASTATIN CALCIUM 10 MG/1
TABLET, FILM COATED ORAL
Qty: 90 TAB | Refills: 1 | Status: SHIPPED
Start: 2021-01-22 | End: 2021-03-15

## 2021-01-22 RX ORDER — HYDROXYZINE HYDROCHLORIDE 25 MG/1
TABLET, FILM COATED ORAL
Qty: 90 TAB | Refills: 5 | Status: SHIPPED | OUTPATIENT
Start: 2021-01-22 | End: 2021-07-27 | Stop reason: SDUPTHER

## 2021-01-22 RX ORDER — AMLODIPINE BESYLATE 10 MG/1
10 TABLET ORAL DAILY
Qty: 90 TAB | Refills: 1 | Status: SHIPPED | OUTPATIENT
Start: 2021-01-22 | End: 2021-07-27 | Stop reason: SDUPTHER

## 2021-01-22 RX ORDER — LOSARTAN POTASSIUM 50 MG/1
50 TABLET ORAL DAILY
Qty: 90 TAB | Refills: 1 | Status: SHIPPED | OUTPATIENT
Start: 2021-01-22 | End: 2021-07-27 | Stop reason: SDUPTHER

## 2021-01-22 ASSESSMENT — PATIENT HEALTH QUESTIONNAIRE - PHQ9: CLINICAL INTERPRETATION OF PHQ2 SCORE: 0

## 2021-01-22 NOTE — PROGRESS NOTES
Virtual Visit: Established Patient   This visit was conducted via Zoom  using secure and encrypted videoconferencing technology. The patient was in a private location in the state of [place].    The patient's identity was confirmed and verbal consent was obtained for this virtual visit.      CC: This is a prior patient of  here today to establish care for diabetes, dyslipidemia, hypertension, B12 deficiency and dermatitis.  He was last seen by his PCP about 1-1/2 years ago.                                                                                                                                   HPI:   Braden presents today with the following.    1. Type 2 diabetes mellitus without complication, without long-term current use of insulin (HCC)  Patient's last A1c was just below diabetes type 2 level at 6.3 but this was done in 2019 and needs recheck.  He is not on diabetic medications.  He does not follow a low-carb diet and works as a long- but hopes to be working closer to home and able to eat low-carb diets he is here in the near future    2. Vitamin B12 deficiency  Last B12 level was low at 203 and patient states he was not aware of this and subsequently is not using B12 replacement    3. Dyslipidemia  Patient currently on atorvastatin 10 mg and reports no myalgias    4. Essential hypertension  Patient takes a combination of amlodipine and losartan and aspirin and believes his blood pressure is doing well but because this is telemedicine, we are unable to check this.    5. Dermatitis  Patient would like a refill on his hydroxyzine which he states he has been using for a while now for generalized pruritus and dry skin.  He states it does not cause him drowsiness.    6. Screening for colorectal cancer  Patient due for colonoscopy    7. Routine general medical examination at a health care facility  Patient overdue for lab work.  Labs from 2019 showed low B12, borderline A1c for diabetes,  "negative hepatitis C for microalbuminuria but elevated glucose at 188.      Patient Active Problem List    Diagnosis Date Noted   • Dyslipidemia 01/22/2021   • Vitamin B12 deficiency 08/28/2019   • Chronic pain of both knees 04/01/2019   • Type 2 diabetes mellitus without complication, without long-term current use of insulin ([facility]) 12/13/2018   • Shift work sleep disorder 09/12/2018   • Morbid obesity with BMI of 40.0-44.9, adult ([facility]) 09/12/2018   • Essential hypertension 09/11/2017       Current Outpatient Medications   Medication Sig Dispense Refill   • atorvastatin (LIPITOR) 10 MG Tab TAKE 1 TABLET BY MOUTH  DAILY 90 Tab 1   • amLODIPine (NORVASC) 10 MG Tab Take 1 Tab by mouth every day. 90 Tab 1   • losartan (COZAAR) 50 MG Tab Take 1 Tab by mouth every day. 90 Tab 1   • hydrOXYzine HCl (ATARAX) 25 MG Tab TAKE 1 TABLET BY MOUTH 3  TIMES DAILY AS NEEDED FOR  ITCHING /ANXIETY 90 Tab 5   • cyanocobalamin (VITAMIN B12) 1000 MCG Tab Take 1 Tab by mouth every day. 30 Tab 11   • aspirin EC (ECOTRIN) 81 MG Tablet Delayed Response Take 1 Tab by mouth every day. 30 Tab    • Psyllium (METAMUCIL FIBER PO) Take  by mouth every day.       No current facility-administered medications for this visit.          Allergies as of 01/22/2021   • (No Known Allergies)        ROS:  Denies, chest pain, Shortness of breath, Edema or neuropathy.  Positive for pruritus.    Resp 18   Ht 1.778 m (5' 10\")   Wt (!) 136.1 kg (300 lb)   BMI 43.05 kg/m²       Physical Exam:  Constitutional: Alert, no distress, well-groomed.  Skin: No rashes in visible areas.  Eye: Round. Conjunctiva clear, lNo icterus.   ENMT: Lips pink without lesions, good dentition, moist mucous membranes. Phonation normal.  Neck: No masses, no thyromegaly. Moves freely without pain.  CV: Pulse as reported by patient  Respiratory: Unlabored respiratory effort, no cough or audible wheeze  Psych: Alert and oriented x3, normal affect and mood.          Assessment and Plan.   55 " y.o. male with the following issues.    1. Type 2 diabetes mellitus without complication, without long-term current use of insulin (HCC)  I reviewed with patient that I would like to see him at least every 6 months if he is a well-controlled diabetic as he has been.  I explained that if his A1c is 6.4 or higher, we probably should start him on Metformin.  I explained about need to do lab work regularly and to continue on his statin and ACE inhibitor.  - HEMOGLOBIN A1C; Future  - Comp Metabolic Panel; Future  - MICROALBUMIN CREAT RATIO URINE; Future  - REFERRAL TO OPHTHALMOLOGY  - aspirin EC (ECOTRIN) 81 MG Tablet Delayed Response; Take 1 Tab by mouth every day.  Dispense: 30 Tab    2. Vitamin B12 deficiency  Patient advised to start over-the-counter B12 because of his low levels.  - VITAMIN B12; Future    3. Dyslipidemia  Patient due for lab work I will determine whether he needs higher dose of Lipitor in the future  - Lipid Profile; Future    4. Essential hypertension  I will refill his medicines since his blood pressure is reported well controlled but I did recommend a an office visit next time  - amLODIPine (NORVASC) 10 MG Tab; Take 1 Tab by mouth every day.  Dispense: 90 Tab; Refill: 1  - losartan (COZAAR) 50 MG Tab; Take 1 Tab by mouth every day.  Dispense: 90 Tab; Refill: 1    5. Dermatitis  Patient reminded about the risk for drowsiness with this medicine  - hydrOXYzine HCl (ATARAX) 25 MG Tab; TAKE 1 TABLET BY MOUTH 3  TIMES DAILY AS NEEDED FOR  ITCHING /ANXIETY  Dispense: 90 Tab; Refill: 5    6. Screening for colorectal cancer    - REFERRAL TO GI FOR COLONOSCOPY    7. Routine general medical examination at a health care facility  Patient will do lab work before next visit in 3 months  - HEMOGLOBIN A1C; Future  - Comp Metabolic Panel; Future  - MICROALBUMIN CREAT RATIO URINE; Future  - Lipid Profile; Future  - VITAMIN B12; Future

## 2021-03-10 ENCOUNTER — HOSPITAL ENCOUNTER (OUTPATIENT)
Dept: LAB | Facility: MEDICAL CENTER | Age: 56
End: 2021-03-10
Attending: NURSE PRACTITIONER
Payer: COMMERCIAL

## 2021-03-10 DIAGNOSIS — Z00.00 ROUTINE GENERAL MEDICAL EXAMINATION AT A HEALTH CARE FACILITY: ICD-10-CM

## 2021-03-10 DIAGNOSIS — E11.9 TYPE 2 DIABETES MELLITUS WITHOUT COMPLICATION, WITHOUT LONG-TERM CURRENT USE OF INSULIN (HCC): ICD-10-CM

## 2021-03-10 DIAGNOSIS — E78.5 DYSLIPIDEMIA: ICD-10-CM

## 2021-03-10 DIAGNOSIS — E53.8 VITAMIN B12 DEFICIENCY: ICD-10-CM

## 2021-03-10 LAB
ALBUMIN SERPL BCP-MCNC: 4.2 G/DL (ref 3.2–4.9)
ALBUMIN/GLOB SERPL: 1.4 G/DL
ALP SERPL-CCNC: 116 U/L (ref 30–99)
ALT SERPL-CCNC: 27 U/L (ref 2–50)
ANION GAP SERPL CALC-SCNC: 12 MMOL/L (ref 7–16)
AST SERPL-CCNC: 21 U/L (ref 12–45)
BILIRUB SERPL-MCNC: 0.5 MG/DL (ref 0.1–1.5)
BUN SERPL-MCNC: 7 MG/DL (ref 8–22)
CALCIUM SERPL-MCNC: 9.1 MG/DL (ref 8.5–10.5)
CHLORIDE SERPL-SCNC: 99 MMOL/L (ref 96–112)
CHOLEST SERPL-MCNC: 216 MG/DL (ref 100–199)
CO2 SERPL-SCNC: 29 MMOL/L (ref 20–33)
CREAT SERPL-MCNC: 0.63 MG/DL (ref 0.5–1.4)
CREAT UR-MCNC: 111.76 MG/DL
EST. AVERAGE GLUCOSE BLD GHB EST-MCNC: 151 MG/DL
GLOBULIN SER CALC-MCNC: 3 G/DL (ref 1.9–3.5)
GLUCOSE SERPL-MCNC: 137 MG/DL (ref 65–99)
HBA1C MFR BLD: 6.9 % (ref 4–5.6)
HDLC SERPL-MCNC: 54 MG/DL
LDLC SERPL CALC-MCNC: 138 MG/DL
MICROALBUMIN UR-MCNC: <1.2 MG/DL
MICROALBUMIN/CREAT UR: NORMAL MG/G (ref 0–30)
POTASSIUM SERPL-SCNC: 4.1 MMOL/L (ref 3.6–5.5)
PROT SERPL-MCNC: 7.2 G/DL (ref 6–8.2)
SODIUM SERPL-SCNC: 140 MMOL/L (ref 135–145)
TRIGL SERPL-MCNC: 119 MG/DL (ref 0–149)
VIT B12 SERPL-MCNC: 607 PG/ML (ref 211–911)

## 2021-03-10 PROCEDURE — 83036 HEMOGLOBIN GLYCOSYLATED A1C: CPT

## 2021-03-10 PROCEDURE — 82043 UR ALBUMIN QUANTITATIVE: CPT

## 2021-03-10 PROCEDURE — 36415 COLL VENOUS BLD VENIPUNCTURE: CPT

## 2021-03-10 PROCEDURE — 80053 COMPREHEN METABOLIC PANEL: CPT

## 2021-03-10 PROCEDURE — 82570 ASSAY OF URINE CREATININE: CPT

## 2021-03-10 PROCEDURE — 80061 LIPID PANEL: CPT

## 2021-03-10 PROCEDURE — 82607 VITAMIN B-12: CPT

## 2021-03-15 ENCOUNTER — OFFICE VISIT (OUTPATIENT)
Dept: MEDICAL GROUP | Facility: MEDICAL CENTER | Age: 56
End: 2021-03-15
Payer: COMMERCIAL

## 2021-03-15 VITALS
WEIGHT: 305 LBS | RESPIRATION RATE: 16 BRPM | OXYGEN SATURATION: 93 % | HEART RATE: 100 BPM | HEIGHT: 70 IN | DIASTOLIC BLOOD PRESSURE: 80 MMHG | SYSTOLIC BLOOD PRESSURE: 144 MMHG | BODY MASS INDEX: 43.67 KG/M2

## 2021-03-15 DIAGNOSIS — F51.01 PRIMARY INSOMNIA: ICD-10-CM

## 2021-03-15 DIAGNOSIS — L98.9 SKIN LESION: ICD-10-CM

## 2021-03-15 DIAGNOSIS — E78.5 DYSLIPIDEMIA: ICD-10-CM

## 2021-03-15 DIAGNOSIS — I10 ESSENTIAL HYPERTENSION: ICD-10-CM

## 2021-03-15 DIAGNOSIS — Z23 NEED FOR VACCINATION: ICD-10-CM

## 2021-03-15 DIAGNOSIS — E66.01 MORBID OBESITY WITH BMI OF 40.0-44.9, ADULT (HCC): ICD-10-CM

## 2021-03-15 DIAGNOSIS — E11.9 TYPE 2 DIABETES MELLITUS WITHOUT COMPLICATION, WITHOUT LONG-TERM CURRENT USE OF INSULIN (HCC): ICD-10-CM

## 2021-03-15 PROCEDURE — 99214 OFFICE O/P EST MOD 30 MIN: CPT | Performed by: NURSE PRACTITIONER

## 2021-03-15 RX ORDER — ZOLPIDEM TARTRATE 10 MG/1
10 TABLET ORAL NIGHTLY PRN
Qty: 30 EACH | Refills: 2 | Status: SHIPPED | OUTPATIENT
Start: 2021-03-15 | End: 2021-04-14

## 2021-03-15 RX ORDER — ATORVASTATIN CALCIUM 20 MG/1
20 TABLET, FILM COATED ORAL DAILY
Qty: 90 TABLET | Refills: 3 | Status: SHIPPED | OUTPATIENT
Start: 2021-03-15 | End: 2021-07-27 | Stop reason: SDUPTHER

## 2021-03-15 RX ORDER — METFORMIN HYDROCHLORIDE 500 MG/1
500 TABLET, EXTENDED RELEASE ORAL 2 TIMES DAILY
Qty: 180 TABLET | Refills: 3 | Status: SHIPPED | OUTPATIENT
Start: 2021-03-15 | End: 2022-06-10 | Stop reason: SDUPTHER

## 2021-03-15 ASSESSMENT — ENCOUNTER SYMPTOMS: INSOMNIA: 1

## 2021-03-15 NOTE — PROGRESS NOTES
Subjective:      Braden Cruz is a 55 y.o. male who presents with Annual Exam        CC: Patient is here today accompanied by his wife for annual physical but he has a number of issues to deal with today including skin lesions and insomnia as well as follow-up on elevated blood sugar requiring medication.    HPI       1. Type 2 diabetes mellitus without complication, without long-term current use of insulin (Formerly [facility])  I saw patient for his initial visit through telemedicine about 2 months ago.  He reported history of prediabetes with his last A1c at 6.3 but it was 2 years ago.  He did lab work for me and the results came back showing an A1c of 6.9 without any medication and fasting blood sugar 137.  He showed no microalbuminuria and GFR was normal.  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    2. Morbid obesity with BMI of 40.0-44.9, adult (Formerly [facility])  Patient's BMI is high and he has had trouble losing weight so he and his wife who is with him today would like him to look into bariatrics.    3. Dyslipidemia  Patient currently on low-dose Lipitor but LDL is still high at 138 and is a diabetic should be below 100    4. Skin lesion  Patient has some skin lesions especially 1 on his right temple area which he states has been there for years and he does pick at it frequently.  He does not think it is grown in size.    5. Primary insomnia  Patient has history of insomnia mostly after working a night shift.  He states he has trouble falling asleep and at one time was being prescribed Ambien by his PCP.  He states he would not use it every day and does not take any other controlled substances.    6. Essential hypertension  Blood pressure today appears borderline at 144/80 on current combination of losartan, amlodipine  Past Medical History:   Diagnosis Date   • Hypertension    • Pain     knees     Social History     Socioeconomic History   • Marital status:       Spouse name: Not on file   • Number of children: Not on file   • Years of education: Not on file   • Highest education level: Not on file   Occupational History   • Not on file   Tobacco Use   • Smoking status: Never Smoker   • Smokeless tobacco: Never Used   Substance and Sexual Activity   • Alcohol use: Yes     Comment: 1-2 per day   • Drug use: No   • Sexual activity: Yes     Partners: Female   Other Topics Concern   • Not on file   Social History Narrative   • Not on file     Social Determinants of Health     Financial Resource Strain:    • Difficulty of Paying Living Expenses:    Food Insecurity:    • Worried About Running Out of Food in the Last Year:    • Ran Out of Food in the Last Year:    Transportation Needs:    • Lack of Transportation (Medical):    • Lack of Transportation (Non-Medical):    Physical Activity:    • Days of Exercise per Week:    • Minutes of Exercise per Session:    Stress:    • Feeling of Stress :    Social Connections:    • Frequency of Communication with Friends and Family:    • Frequency of Social Gatherings with Friends and Family:    • Attends Sabianist Services:    • Active Member of Clubs or Organizations:    • Attends Club or Organization Meetings:    • Marital Status:    Intimate Partner Violence:    • Fear of Current or Ex-Partner:    • Emotionally Abused:    • Physically Abused:    • Sexually Abused:      Current Outpatient Medications   Medication Sig Dispense Refill   • metFORMIN ER (GLUCOPHAGE XR) 500 MG TABLET SR 24 HR Take 1 tablet by mouth 2 times a day. 180 tablet 3   • atorvastatin (LIPITOR) 20 MG Tab Take 1 tablet by mouth every day. 90 tablet 3   • zolpidem (AMBIEN) 10 MG Tab Take 1 tablet by mouth at bedtime as needed for Sleep for up to 30 days. 30 Each 2   • amLODIPine (NORVASC) 10 MG Tab Take 1 Tab by mouth every day. 90 Tab 1   • losartan (COZAAR) 50 MG Tab Take 1 Tab by mouth every day. 90 Tab 1   • hydrOXYzine HCl (ATARAX) 25 MG Tab TAKE 1 TABLET BY MOUTH 3   "TIMES DAILY AS NEEDED FOR  ITCHING /ANXIETY 90 Tab 5   • cyanocobalamin (VITAMIN B12) 1000 MCG Tab Take 1 Tab by mouth every day. 30 Tab 11   • aspirin EC (ECOTRIN) 81 MG Tablet Delayed Response Take 1 Tab by mouth every day. 30 Tab    • Psyllium (METAMUCIL FIBER PO) Take  by mouth every day.       No current facility-administered medications for this visit.     Family History   Problem Relation Age of Onset   • No Known Problems Mother    • Hypertension Father    • No Known Problems Sister    • No Known Problems Brother    • Heart Attack Maternal Uncle    • No Known Problems Maternal Grandmother    • Heart Attack Maternal Grandfather    • Heart Attack Paternal Grandmother    • No Known Problems Paternal Grandfather          Review of Systems   Skin: Positive for rash.   Psychiatric/Behavioral: The patient has insomnia.    All other systems reviewed and are negative.         Objective:     /80 (BP Location: Left arm, Patient Position: Sitting, BP Cuff Size: Adult)   Pulse 100   Resp 16   Ht 1.778 m (5' 10\")   Wt (!) 138 kg (305 lb)   SpO2 93%   BMI 43.76 kg/m²      Physical Exam  Vitals and nursing note reviewed.   Constitutional:       General: He is not in acute distress.     Appearance: He is well-developed. He is not diaphoretic.   HENT:      Head: Normocephalic and atraumatic.      Right Ear: External ear normal.      Left Ear: External ear normal.      Nose: Nose normal.   Eyes:      General:         Right eye: No discharge.         Left eye: No discharge.      Conjunctiva/sclera: Conjunctivae normal.   Neck:      Thyroid: No thyromegaly.      Trachea: No tracheal deviation.   Cardiovascular:      Rate and Rhythm: Normal rate and regular rhythm.      Heart sounds: Normal heart sounds. No murmur.   Pulmonary:      Effort: Pulmonary effort is normal. No respiratory distress.      Breath sounds: Normal breath sounds. No wheezing or rales.   Musculoskeletal:      Cervical back: Normal range of motion " and neck supple.   Feet:      Right foot:      Protective Sensation: 7 sites tested. 7 sites sensed.      Skin integrity: Skin integrity normal.      Toenail Condition: Fungal disease present.     Left foot:      Protective Sensation: 7 sites tested. 7 sites sensed.      Skin integrity: Skin integrity normal.      Toenail Condition: Fungal disease present.  Lymphadenopathy:      Cervical: No cervical adenopathy.   Skin:     General: Skin is warm and dry.      Findings: Rash present. No erythema.      Comments: There are number of skin tags and moles on patient's body.  There is 1 raised area on the right temple area with irregular border which needs further evaluation   Neurological:      Mental Status: He is alert and oriented to person, place, and time.      Coordination: Coordination normal.   Psychiatric:         Behavior: Behavior normal.         Thought Content: Thought content normal.         Judgment: Judgment normal.                 Assessment/Plan:        1. Type 2 diabetes mellitus without complication, without long-term current use of insulin (Formerly [facility])  Patient's A1c is now at 6.9 which does put him in the type 2 diabetes range.  I discussed options and will start him on Metformin once a day for 1 week and then go up to twice a day with meals.  I reviewed with him the potential problems with Metformin including affecting kidney function and GI upset.  His current GFR is greater than 60.  I talked him about a low-carb diet and weight loss.  He is going to look into bariatrics.  He is already on an ARB, aspirin and statin  - metFORMIN ER (GLUCOPHAGE XR) 500 MG TABLET SR 24 HR; Take 1 tablet by mouth 2 times a day.  Dispense: 180 tablet; Refill: 3    2. Morbid obesity with BMI of 40.0-44.9, adult (Formerly [facility])  Patient will be referred to bariatrics to discuss gastric sleeve which he is interested in.  - REFERRAL TO BARIATRIC SURGERY    3. Dyslipidemia  LDL cholesterol is high for diabetic so I am going to increase his  Lipitor to 20 mg daily and that he should have a recheck levels in the next few months  - atorvastatin (LIPITOR) 20 MG Tab; Take 1 tablet by mouth every day.  Dispense: 90 tablet; Refill: 3    4. Skin lesion  Patient has a number of skin tags and lesions, especially one on his right temple which he states has been there for years but needs further evaluation  - REFERRAL TO DERMATOLOGY    5. Primary insomnia  I reviewed with patient the risk of Ambien usage and the fact that it is a controlled substance.  An ORT was done today and showed low risk for addiction and  shows he is not on any other controlled substances since a year ago when he was getting Ambien from his previous PCP.  He states this should last him a while and he will only use it occasionally.  A drug contract was signed and reviewed before the medicine was prescribed.  I stressed the importance of no alcohol or other drugs with the medicine and that he must give himself 8 to 10 hours sleep before driving.  - Controlled Substance Treatment Agreement  - zolpidem (AMBIEN) 10 MG Tab; Take 1 tablet by mouth at bedtime as needed for Sleep for up to 30 days.  Dispense: 30 Each; Refill: 2    6. Essential hypertension  Blood pressure borderline so I advised him to lose weight and watch his sodium intake.  His weight loss will also help with this but if it does not go down, he will need a higher dose of losartan at his 3-month follow-up

## 2021-06-07 ENCOUNTER — OFFICE VISIT (OUTPATIENT)
Dept: DERMATOLOGY | Facility: IMAGING CENTER | Age: 56
End: 2021-06-07
Payer: COMMERCIAL

## 2021-06-07 VITALS — WEIGHT: 305 LBS | HEIGHT: 70 IN | TEMPERATURE: 98.5 F | BODY MASS INDEX: 43.67 KG/M2

## 2021-06-07 DIAGNOSIS — L82.1 SEBORRHEIC KERATOSES: ICD-10-CM

## 2021-06-07 DIAGNOSIS — L57.0 ACTINIC KERATOSES: ICD-10-CM

## 2021-06-07 DIAGNOSIS — Z12.83 SKIN CANCER SCREENING: ICD-10-CM

## 2021-06-07 DIAGNOSIS — L73.8 SEBACEOUS HYPERPLASIA: ICD-10-CM

## 2021-06-07 DIAGNOSIS — D22.9 MULTIPLE NEVI: ICD-10-CM

## 2021-06-07 DIAGNOSIS — D18.01 CHERRY ANGIOMA: ICD-10-CM

## 2021-06-07 DIAGNOSIS — D48.5 NEOPLASM OF UNCERTAIN BEHAVIOR OF SKIN: ICD-10-CM

## 2021-06-07 PROCEDURE — 99203 OFFICE O/P NEW LOW 30 MIN: CPT | Mod: 25 | Performed by: NURSE PRACTITIONER

## 2021-06-07 PROCEDURE — 17003 DESTRUCT PREMALG LES 2-14: CPT | Performed by: NURSE PRACTITIONER

## 2021-06-07 PROCEDURE — 17000 DESTRUCT PREMALG LESION: CPT | Performed by: NURSE PRACTITIONER

## 2021-06-07 PROCEDURE — 11102 TANGNTL BX SKIN SINGLE LES: CPT | Mod: 59 | Performed by: NURSE PRACTITIONER

## 2021-06-07 NOTE — PROGRESS NOTES
"DERMATOLOGY NOTE  NEW VISIT       Chief complaint: Establish Care (IVONNE)     HPI/location: right preauricular flesh colored and red papule  Time present: 10 + years  Painful lesion: No  Itching lesion: No  Enlarging lesion: Yes  Anything make it better or worse? No    HPI/location: right temple, papule  Time present: 1 + years  Painful lesion: No  Itching lesion: No  Enlarging lesion: No  Anything make it better or worse? No      History of skin cancer: No  History of precancers/actinic keratoses: No  History of biopsies:Yes, Details: benign moles age 9  History of blistering/severe sunburns:Yes, Details: Young adult  Family history of skin cancer:No  Family history of atypical moles:Yes, Details: Father, not sure if atypical      No Known Allergies     MEDICATIONS:  Medications relevant to specialty reviewed.     REVIEW OF SYSTEMS:   Positive for skin (see HPI)  Negative for fevers and chills       EXAM:  Temp 36.9 °C (98.5 °F)   Ht 1.778 m (5' 10\")   Wt (!) 138 kg (305 lb)   BMI 43.76 kg/m²   Constitutional: Well-developed, well-nourished, and in no distress.     A total body skin exam was performed excluding the genitals per patient preference and including the following areas: head (including face), neck, chest, abdomen, groin/buttocks, back, bilateral upper extremities, and bilateral lower extremities with the following pertinent findings listed below and/or in assessment/plan.     Multiple tan skin-colored macules papules scattered over the trunk >> extremities. All with benign-appearing pigment network patterns on dermoscopy      Several tan stuck-on waxy papules scattered on the face, trunk and extremities    Several scattered 1-3mm bright red macules and thin papules on the face, trunk and extremities    Few scattered yellowish/red papules with telangiectasias and central dell      7 mm erythematous, pearly papule with scale RT preauricular    AK left preauricular, right forearm    B/L axilla skin " tags      IMPRESSION / PLAN:    1. Neoplasm of uncertain behavior of skin  Procedure Note   Procedure: Biopsy by shave technique  Location: right pre-auricular  Size: as noted in exam  Preoperative diagnosis:BCC  Risks, benefits and alternatives of procedure discussed and written informed consent obtained for procedure and verbal consent for photos. Time out completed. Area of biopsy prepped with alcohol. Anesthesia with 1% lidocaine with epinephrine administered with 30 gauge needle. Shave biopsy of the site performed. Hemostasis achieved with pressure and aluminum chloride. Vaseline applied to wound with bandage. Patient tolerated procedure well and there were no complications. The specimen was sent to the pathology lab by the staff. Wound care was discussed.      2. Actinic keratoses  CRYOTHERAPY:  Risks (including, but not limited to: hypo or hyperpigmentation, redness, blister, blood blister, recurrence, need for further treatment, infection, scar) and benefits of cryotherapy discussed. Patient verbally agreed to proceed with treatment. 2 cryotherapy freeze thaw cycles of 10 seconds were applied to 2 lesions as noted in exam with cryac. Patient tolerated procedure well. Aftercare instructions given.      3. Seborrheic keratoses  - Benign-appearing nature of lesions discussed. Advised to return to clinic for any new or concerning changes.      4. Cherry angioma  - Benign-appearing nature of lesions discussed. Advised to return to clinic for any new or concerning changes.      5. Multiple nevi  - Benign-appearing nature of lesions discussed. Advised to return to clinic for any new or concerning changes.  - ABCDE's of melanoma discussed      6. Sebaceous hyperplasia  - Benign-appearing nature of lesions discussed. Advised to return to clinic for any new or concerning changes.      7. Skin cancer screening  Skin cancer education  - discussed importance of sun protective clothing, eyewear  - discussed importance of  daily use of broad spectrum sunscreen with SPF 30 or greater, as well as need for reapplication ~every 2 hours when exposed to UVR  - discussed importance of regular self-exams, ideally once per month, every 12 months exams in clinic  - MIRTA's of melanoma discussed  - patient to bring any new or concerning lesions to my attention  - Patient educational handout provided and reviewed with patient         Please note that this dictation was created using voice recognition software. I have made every reasonable attempt to correct obvious errors, but I expect that there are errors of grammar and possibly content that I did not discover before finalizing the note.      Return to clinic in: Return in about 1 year (around 6/7/2022) for IVONNE. and as needed for any new or changing skin lesions.

## 2021-06-11 ENCOUNTER — TELEPHONE (OUTPATIENT)
Dept: DERMATOLOGY | Facility: IMAGING CENTER | Age: 56
End: 2021-06-11

## 2021-06-11 DIAGNOSIS — C44.319 BASAL CELL CARCINOMA OF PREAURICULAR REGION: ICD-10-CM

## 2021-06-11 NOTE — TELEPHONE ENCOUNTER
Patient notified of D- Path results B[facility] , faxed referral to [facility] , all information given to patient . Scanned in media tab

## 2021-06-14 ENCOUNTER — APPOINTMENT (OUTPATIENT)
Dept: MEDICAL GROUP | Facility: MEDICAL CENTER | Age: 56
End: 2021-06-14
Payer: COMMERCIAL

## 2021-06-14 NOTE — PROGRESS NOTES
No chief complaint on file.      Subjective:     HPI:     Braden Cruz is a 56 y.o. male here to discuss the evaluation and management of:    Patient of Zak PAREDES    Diabetes  Taking Metformin 500 mg twice daily for this.  Most recent A1c in March was 6.9%.    Dyslipidemia  Currently taking atorvastatin 20 mg for this.  Most recent lipid panel with an LDL of 138.  Goal is to have LDL below 70.    Blood pressure  Currently taking amlodipine, losartan for this.  GFR above normal.          ROS:  Denies any Headache, Blurred Vision, Confusion, Chest pain,  Shortness of breath,  Abdominal pain, Changes of bowel or bladder, Lower ext edema, Fevers, Nights sweats, Weight Changes, Focal weakness or numbness.  And all other systems reviewed and are all negative. POSITIVE FOR ***        Current Outpatient Medications:   •  metFORMIN ER (GLUCOPHAGE XR) 500 MG TABLET SR 24 HR, Take 1 tablet by mouth 2 times a day., Disp: 180 tablet, Rfl: 3  •  atorvastatin (LIPITOR) 20 MG Tab, Take 1 tablet by mouth every day., Disp: 90 tablet, Rfl: 3  •  amLODIPine (NORVASC) 10 MG Tab, Take 1 Tab by mouth every day., Disp: 90 Tab, Rfl: 1  •  losartan (COZAAR) 50 MG Tab, Take 1 Tab by mouth every day., Disp: 90 Tab, Rfl: 1  •  hydrOXYzine HCl (ATARAX) 25 MG Tab, TAKE 1 TABLET BY MOUTH 3  TIMES DAILY AS NEEDED FOR  ITCHING /ANXIETY, Disp: 90 Tab, Rfl: 5  •  cyanocobalamin (VITAMIN B12) 1000 MCG Tab, Take 1 Tab by mouth every day., Disp: 30 Tab, Rfl: 11  •  aspirin EC (ECOTRIN) 81 MG Tablet Delayed Response, Take 1 Tab by mouth every day., Disp: 30 Tab, Rfl:   •  Psyllium (METAMUCIL FIBER PO), Take  by mouth every day., Disp: , Rfl:     No Known Allergies    Past Medical History:   Diagnosis Date   • Hypertension    • Pain     knees     Past Surgical History:   Procedure Laterality Date   • KNEE ARTHROSCOPY Bilateral 5/30/2019    Procedure: ARTHROSCOPY, KNEE;  Surgeon: Vinayak Kuhn M.D.;  Location: SURGERY [facility];   Service: Orthopedics   • MEDIAL MENISCECTOMY Bilateral 5/30/2019    Procedure: MENISCECTOMY, KNEE, MEDIAL - PARTIAL;  Surgeon: Vinayak Kuhn M.D.;  Location: SURGERY [facility];  Service: Orthopedics   • KNEE ARTHROSCOPY Right 2006   • LUMBAR FUSION POSTERIOR  1999, 2001    x2 surgeries     Family History   Problem Relation Age of Onset   • No Known Problems Mother    • Hypertension Father    • No Known Problems Sister    • No Known Problems Brother    • Heart Attack Maternal Uncle    • No Known Problems Maternal Grandmother    • Heart Attack Maternal Grandfather    • Heart Attack Paternal Grandmother    • No Known Problems Paternal Grandfather      Social History     Socioeconomic History   • Marital status:      Spouse name: Not on file   • Number of children: Not on file   • Years of education: Not on file   • Highest education level: Not on file   Occupational History   • Not on file   Tobacco Use   • Smoking status: Never Smoker   • Smokeless tobacco: Never Used   Vaping Use   • Vaping Use: Never used   Substance and Sexual Activity   • Alcohol use: Yes     Comment: 1-2 per day   • Drug use: No   • Sexual activity: Yes     Partners: Female   Other Topics Concern   • Not on file   Social History Narrative   • Not on file     Social Determinants of Health     Financial Resource Strain:    • Difficulty of Paying Living Expenses:    Food Insecurity:    • Worried About Running Out of Food in the Last Year:    • Ran Out of Food in the Last Year:    Transportation Needs:    • Lack of Transportation (Medical):    • Lack of Transportation (Non-Medical):    Physical Activity:    • Days of Exercise per Week:    • Minutes of Exercise per Session:    Stress:    • Feeling of Stress :    Social Connections:    • Frequency of Communication with Friends and Family:    • Frequency of Social Gatherings with Friends and Family:    • Attends Zoroastrian Services:    • Active Member of Clubs or Organizations:    •  Attends Club or Organization Meetings:    • Marital Status:    Intimate Partner Violence:    • Fear of Current or Ex-Partner:    • Emotionally Abused:    • Physically Abused:    • Sexually Abused:        Objective:     Vitals: There were no vitals taken for this visit.   General: Alert, pleasant, NAD  HEENT: Normocephalic.  Neck supple.  No thyromegaly or masses palpated. No cervical or supraclavicular lymphadenopathy.  Heart: Regular rate and rhythm.  S1 and S2 normal.  No murmurs appreciated.  Respiratory: Normal respiratory effort.  Clear to auscultation bilaterally. No wheezing  Skin: Warm, dry, no rashes.  Extremities: No leg edema. No discoloration  Neurological: No tremors  Psych:  Affect/mood is normal, judgement is good, memory is intact, grooming is appropriate.    Assessment/Plan:     There are no diagnoses linked to this encounter.          No follow-ups on file.    {I have placed the above orders and discussed them with an approved delegating provider.  The MA is performing the below orders under the direction of ***.}      Tatyana ALEX

## 2021-07-20 ENCOUNTER — APPOINTMENT (RX ONLY)
Dept: URBAN - METROPOLITAN AREA CLINIC 36 | Facility: CLINIC | Age: 56
Setting detail: DERMATOLOGY
End: 2021-07-20

## 2021-07-20 PROBLEM — C44.319 BASAL CELL CARCINOMA OF SKIN OF OTHER PARTS OF FACE: Status: ACTIVE | Noted: 2021-07-20

## 2021-07-20 PROCEDURE — 17312 MOHS ADDL STAGE: CPT

## 2021-07-20 PROCEDURE — ? MOHS SURGERY

## 2021-07-20 PROCEDURE — 17311 MOHS 1 STAGE H/N/HF/G: CPT

## 2021-07-20 PROCEDURE — 13132 CMPLX RPR F/C/C/M/N/AX/G/H/F: CPT

## 2021-07-20 NOTE — PROCEDURE: MOHS SURGERY
Closure 2 Information: This tab is for additional flaps and grafts, including complex repair and grafts and complex repair and flaps. You can also specify a different location for the additional defect, if the location is the same you do not need to select a new one. We will insert the automated text for the repair you select below just as we do for solitary flaps and grafts. Please note that at this time if you select a location with a different insurance zone you will need to override the ICD10 and CPT if appropriate.
Quadrants Reporting?: 0
Add Option For Suturegard When Performing Closure?: Yes
Hatchet Flap Text: The defect edges were debeveled with a #15 scalpel blade.  Given the location of the defect, shape of the defect and the proximity to free margins a hatchet flap was deemed most appropriate.  Using a sterile surgical marker, an appropriate hatchet flap was drawn incorporating the defect and placing the expected incisions within the relaxed skin tension lines where possible.    The area thus outlined was incised deep to adipose tissue with a #15 scalpel blade.  The skin margins were undermined to an appropriate distance in all directions utilizing iris scissors.
Include Anticoagulation In Mohs Note?: No
Surgical Defect Width In Cm (Optional): 2.0
Provider Procedure Text (F): After obtaining clear surgical margins the defect was repaired by another provider.
O-T Plasty Text: The defect edges were debeveled with a #15 scalpel blade.  Given the location of the defect, shape of the defect and the proximity to free margins an O-T plasty was deemed most appropriate.  Using a sterile surgical marker, an appropriate O-T plasty was drawn incorporating the defect and placing the expected incisions within the relaxed skin tension lines where possible.    The area thus outlined was incised deep to adipose tissue with a #15 scalpel blade.  The skin margins were undermined to an appropriate distance in all directions utilizing iris scissors.
Special Stains Stage 10 - Results: Base On Clearance Noted Above
Purse String (Intermediate) Text: Given the location of the defect and the characteristics of the surrounding skin a purse string intermediate closure was deemed most appropriate.  Undermining was performed circumfirentially around the surgical defect.  A purse string suture was then placed and tightened.
Localized Dermabrasion With Wire Brush Text: The patient was draped in routine manner.  Localized dermabrasion using 3 x 17 mm wire brush was performed in routine manner to papillary dermis. This spot dermabrasion is being performed to complete skin cancer reconstruction. It also will eliminate the other sun damaged precancerous cells that are known to be part of the regional effect of a lifetime's worth of sun exposure. This localized dermabrasion is therapeutic and should not be considered cosmetic in any regard.
Cheek-To-Nose Interpolation Flap Text: A decision was made to reconstruct the defect utilizing an interpolation axial flap and a staged reconstruction.  A telfa template was made of the defect.  This telfa template was then used to outline the Cheek-To-Nose Interpolation flap.  The donor area for the pedicle flap was then injected with anesthesia.  The flap was excised through the skin and subcutaneous tissue down to the layer of the underlying musculature.  The interpolation flap was carefully excised within this deep plane to maintain its blood supply.  The edges of the donor site were undermined.   The donor site was closed in a primary fashion.  The pedicle was then rotated into position and sutured.  Once the tube was sutured into place, adequate blood supply was confirmed with blanching and refill.  The pedicle was then wrapped with xeroform gauze and dressed appropriately with a telfa and gauze bandage to ensure continued blood supply and protect the attached pedicle.
Repair Anesthesia Method: local infiltration
Referred To Otolaryngology For Closure Text (Leave Blank If You Do Not Want): After obtaining clear surgical margins the patient was sent to otolaryngology for surgical repair.  The patient understands they will receive post-surgical care and follow-up from the referring physician's office.
Cheiloplasty (Less Than 50%) Text: A decision was made to reconstruct the defect with a  cheiloplasty.  The defect was undermined extensively.  Additional obicularis oris muscle was excised with a 15 blade scalpel.  The defect was converted into a full thickness wedge, of less than 50% of the vertical height of the lip, to facilite a better cosmetic result.  Small vessels were then tied off with 5-0 monocyrl. The obicularis oris, superficial fascia, adipose and dermis were then reapproximated.  After the deeper layers were approximated the epidermis was reapproximated with particular care given to realign the vermilion border.
Donor Site Anesthesia Type: same as repair anesthesia
Stage 15: Additional Anesthesia Type: 1% lidocaine with epinephrine
Deep Sutures: 5-0 Maxon
Mohs Rapid Report Verbiage: The area of clinically evident tumor was marked with skin marking ink and appropriately hatched.  The initial incision was made following the Mohs approach through the skin.  The specimen was taken to the lab, divided into the necessary number of pieces, chromacoded and processed according to the Mohs protocol.  This was repeated in successive stages until a tumor free defect was achieved.
Island Pedicle Flap-Requiring Vessel Identification Text: The defect edges were debeveled with a #15 scalpel blade.  Given the location of the defect, shape of the defect and the proximity to free margins an island pedicle advancement flap was deemed most appropriate.  Using a sterile surgical marker, an appropriate advancement flap was drawn, based on the axial vessel mentioned above, incorporating the defect, outlining the appropriate donor tissue and placing the expected incisions within the relaxed skin tension lines where possible.    The area thus outlined was incised deep to adipose tissue with a #15 scalpel blade.  The skin margins were undermined to an appropriate distance in all directions around the primary defect and laterally outward around the island pedicle utilizing iris scissors.  There was minimal undermining beneath the pedicle flap.
Modified Advancement Flap Text: The defect edges were debeveled with a #15 scalpel blade.  Given the location of the defect, shape of the defect and the proximity to free margins a modified advancement flap was deemed most appropriate.  Using a sterile surgical marker, an appropriate advancement flap was drawn incorporating the defect and placing the expected incisions within the relaxed skin tension lines where possible.    The area thus outlined was incised deep to adipose tissue with a #15 scalpel blade.  The skin margins were undermined to an appropriate distance in all directions utilizing iris scissors.
Asc Procedure Text (F): After obtaining clear surgical margins the patient was sent to an ASC for surgical repair.  The patient understands they will receive post-surgical care and follow-up from the ASC physician.
Advancement Flap (Single) Text: The defect edges were debeveled with a #15 scalpel blade.  Given the location of the defect and the proximity to free margins a single advancement flap was deemed most appropriate.  Using a sterile surgical marker, an appropriate advancement flap was drawn incorporating the defect and placing the expected incisions within the relaxed skin tension lines where possible.    The area thus outlined was incised deep to adipose tissue with a #15 scalpel blade.  The skin margins were undermined to an appropriate distance in all directions utilizing iris scissors.
Xenograft Text: The defect edges were debeveled with a #15 scalpel blade.  Given the location of the defect, shape of the defect and the proximity to free margins a xenograft was deemed most appropriate.  The graft was then trimmed to fit the size of the defect.  The graft was then placed in the primary defect and oriented appropriately.
Consent (Marginal Mandibular)/Introductory Paragraph: The rationale for Mohs was explained to the patient and consent was obtained. The risks, benefits and alternatives to therapy were discussed in detail. Specifically, the risks of damage to the marginal mandibular branch of the facial nerve, infection, scarring, bleeding, prolonged wound healing, incomplete removal, allergy to anesthesia, and recurrence were addressed. Prior to the procedure, the treatment site was clearly identified and confirmed by the patient. All components of Universal Protocol/PAUSE Rule completed.
Suturegard Intro: Intraoperative tissue expansion was performed, utilizing the SUTUREGARD device, in order to reduce wound tension.
M-Plasty Intermediate Repair Preamble Text (Leave Blank If You Do Not Want): Undermining was performed with blunt dissection.
Location Indication Override (Is Already Calculated Based On Selected Body Location): Area H
Skin Substitute Text: The defect edges were debeveled with a #15 scalpel blade.  Given the location of the defect, shape of the defect and the proximity to free margins a skin substitute graft was deemed most appropriate.  The graft material was trimmed to fit the size of the defect. The graft was then placed in the primary defect and oriented appropriately.
Mucosal Advancement Flap Text: Given the location of the defect, shape of the defect and the proximity to free margins a mucosal advancement flap was deemed most appropriate. Incisions were made with a 15 blade scalpel in the appropriate fashion along the cutaneous vermilion border and the mucosal lip. The remaining actinically damaged mucosal tissue was excised.  The mucosal advancement flap was then elevated to the gingival sulcus with care taken to preserve the neurovascular structures and advanced into the primary defect. Care was taken to ensure that precise realignment of the vermilion border was achieved.
Spiral Flap Text: The defect edges were debeveled with a #15 scalpel blade.  Given the location of the defect, shape of the defect and the proximity to free margins a spiral flap was deemed most appropriate.  Using a sterile surgical marker, an appropriate rotation flap was drawn incorporating the defect and placing the expected incisions within the relaxed skin tension lines where possible. The area thus outlined was incised deep to adipose tissue with a #15 scalpel blade.  The skin margins were undermined to an appropriate distance in all directions utilizing iris scissors.
Plastic Surgeon Procedure Text (D): After obtaining clear surgical margins the patient was sent to plastics for surgical repair.  The patient understands they will receive post-surgical care and follow-up from the referring physician's office.
Graft Cartilage Fenestration Text: The cartilage was fenestrated with a 2mm punch biopsy to help facilitate graft survival and healing.
Mid-Level Procedure Text (C): After obtaining clear surgical margins the patient was sent to a mid-level provider for surgical repair.  The patient understands they will receive post-surgical care and follow-up from the mid-level provider.
Vermilion Border Text: The closure involved the vermilion border.
Z Plasty Text: The lesion was extirpated to the level of the fat with a #15 scalpel blade.  Given the location of the defect, shape of the defect and the proximity to free margins a Z-plasty was deemed most appropriate for repair.  Using a sterile surgical marker, the appropriate transposition arms of the Z-plasty were drawn incorporating the defect and placing the expected incisions within the relaxed skin tension lines where possible.    The area thus outlined was incised deep to adipose tissue with a #15 scalpel blade.  The skin margins were undermined to an appropriate distance in all directions utilizing iris scissors.  The opposing transposition arms were then transposed into place in opposite direction and anchored with interrupted buried subcutaneous sutures.
V-Y Flap Text: The defect edges were debeveled with a #15 scalpel blade.  Given the location of the defect, shape of the defect and the proximity to free margins a V-Y flap was deemed most appropriate.  Using a sterile surgical marker, an appropriate advancement flap was drawn incorporating the defect and placing the expected incisions within the relaxed skin tension lines where possible.    The area thus outlined was incised deep to adipose tissue with a #15 scalpel blade.  The skin margins were undermined to an appropriate distance in all directions utilizing iris scissors.
Area H Indication Text: Tumors in this location are included in Area H (eyelids, eyebrows, nose, lips, chin, ear, pre-auricular, post-auricular, temple, genitalia, hands, feet, ankles and areola).  Tissue conservation is critical in these anatomic locations.
Banner Transposition Flap Text: The defect edges were debeveled with a #15 scalpel blade.  Given the location of the defect and the proximity to free margins a Banner transposition flap was deemed most appropriate.  Using a sterile surgical marker, an appropriate flap drawn around the defect. The area thus outlined was incised deep to adipose tissue with a #15 scalpel blade.  The skin margins were undermined to an appropriate distance in all directions utilizing iris scissors.
Repair Hemostasis (Optional): Pinpoint electrocautery
Orbicularis Oris Muscle Flap Text: The defect edges were debeveled with a #15 scalpel blade.  Given that the defect affected the competency of the oral sphincter an orbicularis oris muscle flap was deemed most appropriate to restore this competency and normal muscle function.  Using a sterile surgical marker, an appropriate flap was drawn incorporating the defect. The area thus outlined was incised with a #15 scalpel blade.
Wound Care: Vaseline
Detail Level: Detailed
Pain Refusal Text: I offered to prescribe pain medication but the patient refused to take this medication.
Zygomaticofacial Flap Text: Given the location of the defect, shape of the defect and the proximity to free margins a zygomaticofacial flap was deemed most appropriate for repair.  Using a sterile surgical marker, the appropriate flap was drawn incorporating the defect and placing the expected incisions within the relaxed skin tension lines where possible. The area thus outlined was incised deep to adipose tissue with a #15 scalpel blade with preservation of a vascular pedicle.  The skin margins were undermined to an appropriate distance in all directions utilizing iris scissors.  The flap was then placed into the defect and anchored with interrupted buried subcutaneous sutures.
Bilateral Helical Rim Advancement Flap Text: The defect edges were debeveled with a #15 blade scalpel.  Given the location of the defect and the proximity to free margins (helical rim) a bilateral helical rim advancement flap was deemed most appropriate.  Using a sterile surgical marker, the appropriate advancement flaps were drawn incorporating the defect and placing the expected incisions between the helical rim and antihelix where possible.  The area thus outlined was incised through and through with a #15 scalpel blade.  With a skin hook and iris scissors, the flaps were gently and sharply undermined and freed up.
Stage 2: Number Of Blocks?: 2
V-Y Plasty Text: The defect edges were debeveled with a #15 scalpel blade.  Given the location of the defect, shape of the defect and the proximity to free margins an V-Y advancement flap was deemed most appropriate.  Using a sterile surgical marker, an appropriate advancement flap was drawn incorporating the defect and placing the expected incisions within the relaxed skin tension lines where possible.    The area thus outlined was incised deep to adipose tissue with a #15 scalpel blade.  The skin margins were undermined to an appropriate distance in all directions utilizing iris scissors.
Suturegard Retention Suture: 0-0 Nylon
W Plasty Text: The lesion was extirpated to the level of the fat with a #15 scalpel blade.  Given the location of the defect, shape of the defect and the proximity to free margins a W-plasty was deemed most appropriate for repair.  Using a sterile surgical marker, the appropriate transposition arms of the W-plasty were drawn incorporating the defect and placing the expected incisions within the relaxed skin tension lines where possible.    The area thus outlined was incised deep to adipose tissue with a #15 scalpel blade.  The skin margins were undermined to an appropriate distance in all directions utilizing iris scissors.  The opposing transposition arms were then transposed into place in opposite direction and anchored with interrupted buried subcutaneous sutures.
Manual Repair Warning Statement: We plan on removing the manually selected variable below in favor of our much easier automatic structured text blocks found in the previous tab. We decided to do this to help make the flow better and give you the full power of structured data. Manual selection is never going to be ideal in our platform and I would encourage you to avoid using manual selection from this point on, especially since I will be sunsetting this feature. It is important that you do one of two things with the customized text below. First, you can save all of the text in a word file so you can have it for future reference. Second, transfer the text to the appropriate area in the Library tab. Lastly, if there is a flap or graft type which we do not have you need to let us know right away so I can add it in before the variable is hidden. No need to panic, we plan to give you roughly 6 months to make the change.
Bi-Rhombic Flap Text: The defect edges were debeveled with a #15 scalpel blade.  Given the location of the defect and the proximity to free margins a bi-rhombic flap was deemed most appropriate.  Using a sterile surgical marker, an appropriate rhombic flap was drawn incorporating the defect. The area thus outlined was incised deep to adipose tissue with a #15 scalpel blade.  The skin margins were undermined to an appropriate distance in all directions utilizing iris scissors.
Consent (Scalp)/Introductory Paragraph: The rationale for Mohs was explained to the patient and consent was obtained. The risks, benefits and alternatives to therapy were discussed in detail. Specifically, the risks of changes in hair growth pattern secondary to repair, infection, scarring, bleeding, prolonged wound healing, incomplete removal, allergy to anesthesia, nerve injury and recurrence were addressed. Prior to the procedure, the treatment site was clearly identified and confirmed by the patient. All components of Universal Protocol/PAUSE Rule completed.
Additional Anesthesia Volume In Cc: 6
Graft Donor Site Dermal Sutures (Optional): 5-0 Polysorb
Melolabial Interpolation Flap Text: A decision was made to reconstruct the defect utilizing an interpolation axial flap and a staged reconstruction.  A telfa template was made of the defect.  This telfa template was then used to outline the melolabial interpolation flap.  The donor area for the pedicle flap was then injected with anesthesia.  The flap was excised through the skin and subcutaneous tissue down to the layer of the underlying musculature.  The pedicle flap was carefully excised within this deep plane to maintain its blood supply.  The edges of the donor site were undermined.   The donor site was closed in a primary fashion.  The pedicle was then rotated into position and sutured.  Once the tube was sutured into place, adequate blood supply was confirmed with blanching and refill.  The pedicle was then wrapped with xeroform gauze and dressed appropriately with a telfa and gauze bandage to ensure continued blood supply and protect the attached pedicle.
Graft Donor Site Epidermal Sutures (Optional): 5-0 Surgipro
Area M Indication Text: Tumors in this location are included in Area M (cheek, forehead, scalp, neck, jawline and pretibial skin).  Mohs surgery is indicated for tumors in these anatomic locations.
Dressing (No Sutures): pressure dressing with telfa
Hemigard Intro: Due to skin fragility and wound tension, it was decided to use HEMIGARD adhesive retention suture devices to permit a linear closure. The skin was cleaned and dried for a 6cm distance away from the wound. Excessive hair, if present, was removed to allow for adhesion.
Epidermal Closure: running cuticular
Peng Advancement Flap Text: The defect edges were debeveled with a #15 scalpel blade.  Given the location of the defect, shape of the defect and the proximity to free margins a Peng advancement flap was deemed most appropriate.  Using a sterile surgical marker, an appropriate advancement flap was drawn incorporating the defect and placing the expected incisions within the relaxed skin tension lines where possible. The area thus outlined was incised deep to adipose tissue with a #15 scalpel blade.  The skin margins were undermined to an appropriate distance in all directions utilizing iris scissors.
Tumor Debulked?: curette
Rhombic Flap Text: The defect edges were debeveled with a #15 scalpel blade.  Given the location of the defect and the proximity to free margins a rhombic flap was deemed most appropriate.  Using a sterile surgical marker, an appropriate rhombic flap was drawn incorporating the defect.    The area thus outlined was incised deep to adipose tissue with a #15 scalpel blade.  The skin margins were undermined to an appropriate distance in all directions utilizing iris scissors.
Retention Suture Text: Retention sutures were placed to support the closure and prevent dehiscence.
Estimated Blood Loss (Cc): minimal
Consent (Near Eyelid Margin)/Introductory Paragraph: The rationale for Mohs was explained to the patient and consent was obtained. The risks, benefits and alternatives to therapy were discussed in detail. Specifically, the risks of ectropion or eyelid deformity, infection, scarring, bleeding, prolonged wound healing, incomplete removal, allergy to anesthesia, nerve injury and recurrence were addressed. Prior to the procedure, the treatment site was clearly identified and confirmed by the patient. All components of Universal Protocol/PAUSE Rule completed.
Trilobed Flap Text: The defect edges were debeveled with a #15 scalpel blade.  Given the location of the defect and the proximity to free margins a trilobed flap was deemed most appropriate.  Using a sterile surgical marker, an appropriate trilobed flap drawn around the defect.    The area thus outlined was incised deep to adipose tissue with a #15 scalpel blade.  The skin margins were undermined to an appropriate distance in all directions utilizing iris scissors.
Suture Removal: 7 days
Transposition Flap Text: The defect edges were debeveled with a #15 scalpel blade.  Given the location of the defect and the proximity to free margins a transposition flap was deemed most appropriate.  Using a sterile surgical marker, an appropriate transposition flap was drawn incorporating the defect.    The area thus outlined was incised deep to adipose tissue with a #15 scalpel blade.  The skin margins were undermined to an appropriate distance in all directions utilizing iris scissors.
Date Of Previous Biopsy (Optional): 6/7/21
Is There Documentation In The Chart Showing Discussion Of Changes With Another Physician?: Please Select the Appropriate Response
Consent (Temporal Branch)/Introductory Paragraph: The rationale for Mohs was explained to the patient and consent was obtained. The risks, benefits and alternatives to therapy were discussed in detail. Specifically, the risks of damage to the temporal branch of the facial nerve, infection, scarring, bleeding, prolonged wound healing, incomplete removal, allergy to anesthesia, and recurrence were addressed. Prior to the procedure, the treatment site was clearly identified and confirmed by the patient. All components of Universal Protocol/PAUSE Rule completed.
Bcc Histology Text: There were numerous aggregates of basaloid cells.
O-Z Plasty Text: The defect edges were debeveled with a #15 scalpel blade.  Given the location of the defect, shape of the defect and the proximity to free margins an O-Z plasty (double transposition flap) was deemed most appropriate.  Using a sterile surgical marker, the appropriate transposition flaps were drawn incorporating the defect and placing the expected incisions within the relaxed skin tension lines where possible.    The area thus outlined was incised deep to adipose tissue with a #15 scalpel blade.  The skin margins were undermined to an appropriate distance in all directions utilizing iris scissors.  Hemostasis was achieved with electrocautery.  The flaps were then transposed into place, one clockwise and the other counterclockwise, and anchored with interrupted buried subcutaneous sutures.
O-L Flap Text: The defect edges were debeveled with a #15 scalpel blade.  Given the location of the defect, shape of the defect and the proximity to free margins an O-L flap was deemed most appropriate.  Using a sterile surgical marker, an appropriate advancement flap was drawn incorporating the defect and placing the expected incisions within the relaxed skin tension lines where possible.    The area thus outlined was incised deep to adipose tissue with a #15 scalpel blade.  The skin margins were undermined to an appropriate distance in all directions utilizing iris scissors.
Same Histology In Subsequent Stages Text: The pattern and morphology of the tumor is as described in the first stage.
Subsequent Stages Histo Method Verbiage: Using a similar technique to that described above, a thin layer of tissue was removed from all areas where tumor was visible on the previous stage.  The tissue was again oriented, mapped, dyed, and processed as above.
Bilobed Flap Text: The defect edges were debeveled with a #15 scalpel blade.  Given the location of the defect and the proximity to free margins a bilobe flap was deemed most appropriate.  Using a sterile surgical marker, an appropriate bilobe flap drawn around the defect.    The area thus outlined was incised deep to adipose tissue with a #15 scalpel blade.  The skin margins were undermined to an appropriate distance in all directions utilizing iris scissors.
Advancement Flap (Double) Text: The defect edges were debeveled with a #15 scalpel blade.  Given the location of the defect and the proximity to free margins a double advancement flap was deemed most appropriate.  Using a sterile surgical marker, the appropriate advancement flaps were drawn incorporating the defect and placing the expected incisions within the relaxed skin tension lines where possible.    The area thus outlined was incised deep to adipose tissue with a #15 scalpel blade.  The skin margins were undermined to an appropriate distance in all directions utilizing iris scissors.
Staging Info: By selecting yes to the question above you will include information on AJCC 8 tumor staging in your Mohs note. Information on tumor staging will be automatically added for SCCs on the head and neck. AJCC 8 includes tumor size, tumor depth, perineural involvement and bone invasion.
Unna Boot Text: An Unna boot was placed to help immobilize the limb and facilitate more rapid healing.
Advancement-Rotation Flap Text: The defect edges were debeveled with a #15 scalpel blade.  Given the location of the defect, shape of the defect and the proximity to free margins an advancement-rotation flap was deemed most appropriate.  Using a sterile surgical marker, an appropriate flap was drawn incorporating the defect and placing the expected incisions within the relaxed skin tension lines where possible. The area thus outlined was incised deep to adipose tissue with a #15 scalpel blade.  The skin margins were undermined to an appropriate distance in all directions utilizing iris scissors.
Simple / Intermediate / Complex Repair - Final Wound Length In Cm: 4.2
Star Wedge Flap Text: The defect edges were debeveled with a #15 scalpel blade.  Given the location of the defect, shape of the defect and the proximity to free margins a star wedge flap was deemed most appropriate.  Using a sterile surgical marker, an appropriate rotation flap was drawn incorporating the defect and placing the expected incisions within the relaxed skin tension lines where possible. The area thus outlined was incised deep to adipose tissue with a #15 scalpel blade.  The skin margins were undermined to an appropriate distance in all directions utilizing iris scissors.
Number Of Hemigard Strips Per Side: 1
M-Plasty Complex Repair Preamble Text (Leave Blank If You Do Not Want): Extensive wide undermining was performed.
Nasalis-Muscle-Based Myocutaneous Island Pedicle Flap Text: Using a #15 blade, an incision was made around the donor flap to the level of the nasalis muscle. Wide lateral undermining was then performed in both the subcutaneous plane above the nasalis muscle, and in a submuscular plane just above periosteum. This allowed the formation of a free nasalis muscle axial pedicle (based on the angular artery) which was still attached to the actual cutaneous flap, increasing its mobility and vascular viability. Hemostasis was obtained with pinpoint electrocoagulation. The flap was mobilized into position and the pivotal anchor points positioned and stabilized with buried interrupted sutures. Subcutaneous and dermal tissues were closed in a multilayered fashion with sutures. Tissue redundancies were excised, and the epidermal edges were apposed without significant tension and sutured with sutures.
Eye Protection Verbiage: Before proceeding with the stage, a plastic scleral shield was inserted. The globe was anesthetized with a few drops of 1% lidocaine with 1:100,000 epinephrine. Then, an appropriate sized scleral shield was chosen and coated with lacrilube ointment. The shield was gently inserted and left in place for the duration of each stage. After the stage was completed, the shield was gently removed.
Wound Care (No Sutures): Petrolatum
Dermal Autograft Text: The defect edges were debeveled with a #15 scalpel blade.  Given the location of the defect, shape of the defect and the proximity to free margins a dermal autograft was deemed most appropriate.  Using a sterile surgical marker, the primary defect shape was transferred to the donor site. The area thus outlined was incised deep to adipose tissue with a #15 scalpel blade.  The harvested graft was then trimmed of adipose and epidermal tissue until only dermis was left.  The skin graft was then placed in the primary defect and oriented appropriately.
Tarsorrhaphy Text: A tarsorrhaphy was performed using Frost sutures.
No Repair - Repaired With Adjacent Surgical Defect Text (Leave Blank If You Do Not Want): After obtaining clear surgical margins the defect was repaired concurrently with another surgical defect which was in close approximation.
Oculoplastic Surgeon Procedure Text (A): After obtaining clear surgical margins the patient was sent to oculoplastics for surgical repair.  The patient understands they will receive post-surgical care and follow-up from the referring physician's office.
Mastoid Interpolation Flap Text: A decision was made to reconstruct the defect utilizing an interpolation axial flap and a staged reconstruction.  A telfa template was made of the defect.  This telfa template was then used to outline the mastoid interpolation flap.  The donor area for the pedicle flap was then injected with anesthesia.  The flap was excised through the skin and subcutaneous tissue down to the layer of the underlying musculature.  The pedicle flap was carefully excised within this deep plane to maintain its blood supply.  The edges of the donor site were undermined.   The donor site was closed in a primary fashion.  The pedicle was then rotated into position and sutured.  Once the tube was sutured into place, adequate blood supply was confirmed with blanching and refill.  The pedicle was then wrapped with xeroform gauze and dressed appropriately with a telfa and gauze bandage to ensure continued blood supply and protect the attached pedicle.
Consent (Nose)/Introductory Paragraph: The rationale for Mohs was explained to the patient and consent was obtained. The risks, benefits and alternatives to therapy were discussed in detail. Specifically, the risks of nasal deformity, changes in the flow of air through the nose, infection, scarring, bleeding, prolonged wound healing, incomplete removal, allergy to anesthesia, nerve injury and recurrence were addressed. Prior to the procedure, the treatment site was clearly identified and confirmed by the patient. All components of Universal Protocol/PAUSE Rule completed.
Area L Indication Text: Tumors in this location are included in Area L (trunk and extremities).  Mohs surgery is indicated for larger tumors, or tumors with aggressive histologic features, in these anatomic locations.
Rotation Flap Text: The defect edges were debeveled with a #15 scalpel blade.  Given the location of the defect, shape of the defect and the proximity to free margins a rotation flap was deemed most appropriate.  Using a sterile surgical marker, an appropriate rotation flap was drawn incorporating the defect and placing the expected incisions within the relaxed skin tension lines where possible.    The area thus outlined was incised deep to adipose tissue with a #15 scalpel blade.  The skin margins were undermined to an appropriate distance in all directions utilizing iris scissors.
Paramedian Forehead Flap Text: A decision was made to reconstruct the defect utilizing an interpolation axial flap and a staged reconstruction.  A telfa template was made of the defect.  This telfa template was then used to outline the paramedian forehead pedicle flap.  The donor area for the pedicle flap was then injected with anesthesia.  The flap was excised through the skin and subcutaneous tissue down to the layer of the underlying musculature.  The pedicle flap was carefully excised within this deep plane to maintain its blood supply.  The edges of the donor site were undermined.   The donor site was closed in a primary fashion.  The pedicle was then rotated into position and sutured.  Once the tube was sutured into place, adequate blood supply was confirmed with blanching and refill.  The pedicle was then wrapped with xeroform gauze and dressed appropriately with a telfa and gauze bandage to ensure continued blood supply and protect the attached pedicle.
Inflammation Suggestive Of Cancer Camouflage Histology Text: There was a dense lymphocytic infiltrate which prevented adequate histologic evaluation of adjacent structures.
Undermining Type: Entire Wound
Burow's Advancement Flap Text: The defect edges were debeveled with a #15 scalpel blade.  Given the location of the defect and the proximity to free margins a Burow's advancement flap was deemed most appropriate.  Using a sterile surgical marker, the appropriate advancement flap was drawn incorporating the defect and placing the expected incisions within the relaxed skin tension lines where possible.    The area thus outlined was incised deep to adipose tissue with a #15 scalpel blade.  The skin margins were undermined to an appropriate distance in all directions utilizing iris scissors.
Posterior Auricular Interpolation Flap Text: A decision was made to reconstruct the defect utilizing an interpolation axial flap and a staged reconstruction.  A telfa template was made of the defect.  This telfa template was then used to outline the posterior auricular interpolation flap.  The donor area for the pedicle flap was then injected with anesthesia.  The flap was excised through the skin and subcutaneous tissue down to the layer of the underlying musculature.  The pedicle flap was carefully excised within this deep plane to maintain its blood supply.  The edges of the donor site were undermined.   The donor site was closed in a primary fashion.  The pedicle was then rotated into position and sutured.  Once the tube was sutured into place, adequate blood supply was confirmed with blanching and refill.  The pedicle was then wrapped with xeroform gauze and dressed appropriately with a telfa and gauze bandage to ensure continued blood supply and protect the attached pedicle.
Anesthesia Volume In Cc: 5
Postop Diagnosis: Basosquamous Cell Carcinoma
Helical Rim Text: The closure involved the helical rim.
Post-Care Instructions: I reviewed with the patient in detail post-care instructions. Patient is not to engage in any heavy lifting, exercise, or swimming for the next 14 days. Should the patient develop any fevers, chills, bleeding, severe pain patient will contact the office immediately.
Crescentic Advancement Flap Text: The defect edges were debeveled with a #15 scalpel blade.  Given the location of the defect and the proximity to free margins a crescentic advancement flap was deemed most appropriate.  Using a sterile surgical marker, the appropriate advancement flap was drawn incorporating the defect and placing the expected incisions within the relaxed skin tension lines where possible.    The area thus outlined was incised deep to adipose tissue with a #15 scalpel blade.  The skin margins were undermined to an appropriate distance in all directions utilizing iris scissors.
Ftsg Text: The defect edges were debeveled with a #15 scalpel blade.  Given the location of the defect, shape of the defect and the proximity to free margins a full thickness skin graft was deemed most appropriate.  Using a sterile surgical marker, the primary defect shape was transferred to the donor site. The area thus outlined was incised deep to adipose tissue with a #15 scalpel blade.  The harvested graft was then trimmed of adipose tissue until only dermis and epidermis was left.  The skin margins of the secondary defect were undermined to an appropriate distance in all directions utilizing iris scissors.  The secondary defect was closed with interrupted buried subcutaneous sutures.  The skin edges were then re-apposed with running  sutures.  The skin graft was then placed in the primary defect and oriented appropriately.
Purse String (Simple) Text: Given the location of the defect and the characteristics of the surrounding skin a purse string closure was deemed most appropriate.  Undermining was performed circumfirentially around the surgical defect.  A purse string suture was then placed and tightened.
S Plasty Text: Given the location and shape of the defect, and the orientation of relaxed skin tension lines, an S-plasty was deemed most appropriate for repair.  Using a sterile surgical marker, the appropriate outline of the S-plasty was drawn, incorporating the defect and placing the expected incisions within the relaxed skin tension lines where possible.  The area thus outlined was incised deep to adipose tissue with a #15 scalpel blade.  The skin margins were undermined to an appropriate distance in all directions utilizing iris scissors. The skin flaps were advanced over the defect.  The opposing margins were then approximated with interrupted buried subcutaneous sutures.
Secondary Intention Text (Leave Blank If You Do Not Want): The defect will heal with secondary intention.
Consent 2/Introductory Paragraph: Mohs surgery was explained to the patient and consent was obtained. The risks, benefits and alternatives to therapy were discussed in detail. Specifically, the risks of infection, scarring, bleeding, prolonged wound healing, incomplete removal, allergy to anesthesia, nerve injury and recurrence were addressed. Prior to the procedure, the treatment site was clearly identified and confirmed by the patient. All components of Universal Protocol/PAUSE Rule completed.
Complex Repair And Graft Additional Text (Will Appearing After The Standard Complex Repair Text): The complex repair was not sufficient to completely close the primary defect. The remaining additional defect was repaired with the graft mentioned below.
Dorsal Nasal Flap Text: The defect edges were debeveled with a #15 scalpel blade.  Given the location of the defect and the proximity to free margins a dorsal nasal flap was deemed most appropriate.  Using a sterile surgical marker, an appropriate dorsal nasal flap was drawn around the defect.    The area thus outlined was incised deep to adipose tissue with a #15 scalpel blade.  The skin margins were undermined to an appropriate distance in all directions utilizing iris scissors.
Consent (Spinal Accessory)/Introductory Paragraph: The rationale for Mohs was explained to the patient and consent was obtained. The risks, benefits and alternatives to therapy were discussed in detail. Specifically, the risks of damage to the spinal accessory nerve, infection, scarring, bleeding, prolonged wound healing, incomplete removal, allergy to anesthesia, and recurrence were addressed. Prior to the procedure, the treatment site was clearly identified and confirmed by the patient. All components of Universal Protocol/PAUSE Rule completed.
Repair Type: Complex Repair
Debridement Text: The wound edges were debrided prior to proceeding with the closure to facilitate wound healing.
Surgeon Performing Repair (Optional): Leticia Borden MD
Hemigard Postcare Instructions: The HEMIGARD strips are to remain completely dry for at least 5-7 days.
Epidermal Closure Graft Donor Site (Optional): running
Where Do You Want The Question To Include Opioid Counseling Located?: Case Summary Tab
Closure 4 Information: This tab is for additional flaps and grafts above and beyond our usual structured repairs.  Please note if you enter information here it will not currently bill and you will need to add the billing information manually.
Mohs Histo Method Verbiage: Each section was then chromacoded and processed in the Mohs lab using the Mohs protocol and submitted for frozen section.
Melolabial Transposition Flap Text: The defect edges were debeveled with a #15 scalpel blade.  Given the location of the defect and the proximity to free margins a melolabial flap was deemed most appropriate.  Using a sterile surgical marker, an appropriate melolabial transposition flap was drawn incorporating the defect.    The area thus outlined was incised deep to adipose tissue with a #15 scalpel blade.  The skin margins were undermined to an appropriate distance in all directions utilizing iris scissors.
Split-Thickness Skin Graft Text: The defect edges were debeveled with a #15 scalpel blade.  Given the location of the defect, shape of the defect and the proximity to free margins a split thickness skin graft was deemed most appropriate.  Using a sterile surgical marker, the primary defect shape was transferred to the donor site. The split thickness graft was then harvested.  The skin graft was then placed in the primary defect and oriented appropriately.
Interpolation Flap Text: A decision was made to reconstruct the defect utilizing an interpolation axial flap and a staged reconstruction.  A telfa template was made of the defect.  This telfa template was then used to outline the interpolation flap.  The donor area for the pedicle flap was then injected with anesthesia.  The flap was excised through the skin and subcutaneous tissue down to the layer of the underlying musculature.  The interpolation flap was carefully excised within this deep plane to maintain its blood supply.  The edges of the donor site were undermined.   The donor site was closed in a primary fashion.  The pedicle was then rotated into position and sutured.  Once the tube was sutured into place, adequate blood supply was confirmed with blanching and refill.  The pedicle was then wrapped with xeroform gauze and dressed appropriately with a telfa and gauze bandage to ensure continued blood supply and protect the attached pedicle.
Tumor Depth: Less than 6mm from granular layer and no invasion beyond the subcutaneous fat
O-T Advancement Flap Text: The defect edges were debeveled with a #15 scalpel blade.  Given the location of the defect, shape of the defect and the proximity to free margins an O-T advancement flap was deemed most appropriate.  Using a sterile surgical marker, an appropriate advancement flap was drawn incorporating the defect and placing the expected incisions within the relaxed skin tension lines where possible.    The area thus outlined was incised deep to adipose tissue with a #15 scalpel blade.  The skin margins were undermined to an appropriate distance in all directions utilizing iris scissors.
Hemostasis: Electrocautery
O-Z Flap Text: The defect edges were debeveled with a #15 scalpel blade.  Given the location of the defect, shape of the defect and the proximity to free margins an O-Z flap was deemed most appropriate.  Using a sterile surgical marker, an appropriate transposition flap was drawn incorporating the defect and placing the expected incisions within the relaxed skin tension lines where possible. The area thus outlined was incised deep to adipose tissue with a #15 scalpel blade.  The skin margins were undermined to an appropriate distance in all directions utilizing iris scissors.
Nostril Rim Text: The closure involved the nostril rim.
Double O-Z Flap Text: The defect edges were debeveled with a #15 scalpel blade.  Given the location of the defect, shape of the defect and the proximity to free margins a Double O-Z flap was deemed most appropriate.  Using a sterile surgical marker, an appropriate transposition flap was drawn incorporating the defect and placing the expected incisions within the relaxed skin tension lines where possible. The area thus outlined was incised deep to adipose tissue with a #15 scalpel blade.  The skin margins were undermined to an appropriate distance in all directions utilizing iris scissors.
Cartilage Graft Text: The defect edges were debeveled with a #15 scalpel blade.  Given the location of the defect, shape of the defect, the fact the defect involved a full thickness cartilage defect a cartilage graft was deemed most appropriate.  An appropriate donor site was identified, cleansed, and anesthetized. The cartilage graft was then harvested and transferred to the recipient site, oriented appropriately and then sutured into place.  The secondary defect was then repaired using a primary closure.
Ear Wedge Repair Text: A wedge excision was completed by carrying down an excision through the full thickness of the ear and cartilage with an inward facing Burow's triangle. The wound was then closed in a layered fashion.
Double Island Pedicle Flap Text: The defect edges were debeveled with a #15 scalpel blade.  Given the location of the defect, shape of the defect and the proximity to free margins a double island pedicle advancement flap was deemed most appropriate.  Using a sterile surgical marker, an appropriate advancement flap was drawn incorporating the defect, outlining the appropriate donor tissue and placing the expected incisions within the relaxed skin tension lines where possible.    The area thus outlined was incised deep to adipose tissue with a #15 scalpel blade.  The skin margins were undermined to an appropriate distance in all directions around the primary defect and laterally outward around the island pedicle utilizing iris scissors.  There was minimal undermining beneath the pedicle flap.
Consent 3/Introductory Paragraph: I gave the patient a chance to ask questions they had about the procedure.  Following this I explained the Mohs procedure and consent was obtained. The risks, benefits and alternatives to therapy were discussed in detail. Specifically, the risks of infection, scarring, bleeding, prolonged wound healing, incomplete removal, allergy to anesthesia, nerve injury and recurrence were addressed. Prior to the procedure, the treatment site was clearly identified and confirmed by the patient. All components of Universal Protocol/PAUSE Rule completed.
A-T Advancement Flap Text: The defect edges were debeveled with a #15 scalpel blade.  Given the location of the defect, shape of the defect and the proximity to free margins an A-T advancement flap was deemed most appropriate.  Using a sterile surgical marker, an appropriate advancement flap was drawn incorporating the defect and placing the expected incisions within the relaxed skin tension lines where possible.    The area thus outlined was incised deep to adipose tissue with a #15 scalpel blade.  The skin margins were undermined to an appropriate distance in all directions utilizing iris scissors.
No Residual Tumor Seen Histology Text: There were no malignant cells seen in the sections examined.
Consent Type: Consent 1 (Standard)
Graft Donor Site Bandage (Optional-Leave Blank If You Don't Want In Note): Aquaplast was fitted to the graft site and sewn into place. A pressure bandage were applied to the donor site and over the aquaplast bolster.
Bilobed Transposition Flap Text: The defect edges were debeveled with a #15 scalpel blade.  Given the location of the defect and the proximity to free margins a bilobed transposition flap was deemed most appropriate.  Using a sterile surgical marker, an appropriate bilobe flap drawn around the defect.    The area thus outlined was incised deep to adipose tissue with a #15 scalpel blade.  The skin margins were undermined to an appropriate distance in all directions utilizing iris scissors.
Consent 1/Introductory Paragraph: The rationale for Mohs was explained to the patient and consent was obtained. The risks, benefits and alternatives to therapy were discussed in detail. Specifically, the risks of infection, scarring, bleeding, prolonged wound healing, incomplete removal, allergy to anesthesia, nerve injury and recurrence were addressed. Prior to the procedure, the treatment site was clearly identified and confirmed by the patient. All components of Universal Protocol/PAUSE Rule completed.
Consent (Ear)/Introductory Paragraph: The rationale for Mohs was explained to the patient and consent was obtained. The risks, benefits and alternatives to therapy were discussed in detail. Specifically, the risks of ear deformity, infection, scarring, bleeding, prolonged wound healing, incomplete removal, allergy to anesthesia, nerve injury and recurrence were addressed. Prior to the procedure, the treatment site was clearly identified and confirmed by the patient. All components of Universal Protocol/PAUSE Rule completed.
Cheiloplasty (Complex) Text: A decision was made to reconstruct the defect with a  cheiloplasty.  The defect was undermined extensively.  Additional obicularis oris muscle was excised with a 15 blade scalpel.  The defect was converted into a full thickness wedge to facilite a better cosmetic result.  Small vessels were then tied off with 5-0 monocyrl. The obicularis oris, superficial fascia, adipose and dermis were then reapproximated.  After the deeper layers were approximated the epidermis was reapproximated with particular care given to realign the vermilion border.
Consent (Lip)/Introductory Paragraph: The rationale for Mohs was explained to the patient and consent was obtained. The risks, benefits and alternatives to therapy were discussed in detail. Specifically, the risks of lip deformity, changes in the oral aperture, infection, scarring, bleeding, prolonged wound healing, incomplete removal, allergy to anesthesia, nerve injury and recurrence were addressed. Prior to the procedure, the treatment site was clearly identified and confirmed by the patient. All components of Universal Protocol/PAUSE Rule completed.
Epidermal Autograft Text: The defect edges were debeveled with a #15 scalpel blade.  Given the location of the defect, shape of the defect and the proximity to free margins an epidermal autograft was deemed most appropriate.  Using a sterile surgical marker, the primary defect shape was transferred to the donor site. The epidermal graft was then harvested.  The skin graft was then placed in the primary defect and oriented appropriately.
Undermining Location (Optional): in the superficial subcutaneous fat
Nasal Turnover Hinge Flap Text: The defect edges were debeveled with a #15 scalpel blade.  Given the size, depth, location of the defect and the defect being full thickness a nasal turnover hinge flap was deemed most appropriate.  Using a sterile surgical marker, an appropriate hinge flap was drawn incorporating the defect. The area thus outlined was incised with a #15 scalpel blade. The flap was designed to recreate the nasal mucosal lining and the alar rim. The skin margins were undermined to an appropriate distance in all directions utilizing iris scissors.
Primary Defect Length In Cm (Final Defect Size - Required For Flaps/Grafts): 2.4
Suturegard Body: The suture ends were repeatedly re-tightened and re-clamped to achieve the desired tissue expansion.
Mohs Method Verbiage: An incision at a 45 degree angle following the standard Mohs approach was done and the specimen was harvested as a microscopic controlled layer.
Ear Star Wedge Flap Text: The defect edges were debeveled with a #15 blade scalpel.  Given the location of the defect and the proximity to free margins (helical rim) an ear star wedge flap was deemed most appropriate.  Using a sterile surgical marker, the appropriate flap was drawn incorporating the defect and placing the expected incisions between the helical rim and antihelix where possible.  The area thus outlined was incised through and through with a #15 scalpel blade.
Information: Selecting Yes will display possible errors in your note based on the variables you have selected. This validation is only offered as a suggestion for you. PLEASE NOTE THAT THE VALIDATION TEXT WILL BE REMOVED WHEN YOU FINALIZE YOUR NOTE. IF YOU WANT TO FAX A PRELIMINARY NOTE YOU WILL NEED TO TOGGLE THIS TO 'NO' IF YOU DO NOT WANT IT IN YOUR FAXED NOTE.
Home Suture Removal Text: Patient was provided instructions on removing sutures and will remove their sutures at home.  If they have any questions or difficulties they will call the office.
Retention Suture Bite Size: 1 mm
Mart-1 - Negative Histology Text: MART-1 staining demonstrates a normal density and pattern of melanocytes along the dermal-epidermal junction. The surgical margins are negative for tumor cells.
Anesthesia Volume In Cc: 3
Island Pedicle Flap Text: The defect edges were debeveled with a #15 scalpel blade.  Given the location of the defect, shape of the defect and the proximity to free margins an island pedicle advancement flap was deemed most appropriate.  Using a sterile surgical marker, an appropriate advancement flap was drawn incorporating the defect, outlining the appropriate donor tissue and placing the expected incisions within the relaxed skin tension lines where possible.    The area thus outlined was incised deep to adipose tissue with a #15 scalpel blade.  The skin margins were undermined to an appropriate distance in all directions around the primary defect and laterally outward around the island pedicle utilizing iris scissors.  There was minimal undermining beneath the pedicle flap.
Mohs Case Number: [id number]
Cheek Interpolation Flap Text: A decision was made to reconstruct the defect utilizing an interpolation axial flap and a staged reconstruction.  A telfa template was made of the defect.  This telfa template was then used to outline the Cheek Interpolation flap.  The donor area for the pedicle flap was then injected with anesthesia.  The flap was excised through the skin and subcutaneous tissue down to the layer of the underlying musculature.  The interpolation flap was carefully excised within this deep plane to maintain its blood supply.  The edges of the donor site were undermined.   The donor site was closed in a primary fashion.  The pedicle was then rotated into position and sutured.  Once the tube was sutured into place, adequate blood supply was confirmed with blanching and refill.  The pedicle was then wrapped with xeroform gauze and dressed appropriately with a telfa and gauze bandage to ensure continued blood supply and protect the attached pedicle.
Brow Lift Text: A midfrontal incision was made medially to the defect to allow access to the tissues just superior to the left eyebrow. Following careful dissection inferiorly in a supraperiosteal plane to the level of the left eyebrow, several 3-0 monocryl sutures were used to resuspend the eyebrow orbicularis oculi muscular unit to the superior frontal bone periosteum. This resulted in an appropriate reapproximation of static eyebrow symmetry and correction of the left brow ptosis.
Composite Graft Text: The defect edges were debeveled with a #15 scalpel blade.  Given the location of the defect, shape of the defect, the proximity to free margins and the fact the defect was full thickness a composite graft was deemed most appropriate.  The defect was outline and then transferred to the donor site.  A full thickness graft was then excised from the donor site. The graft was then placed in the primary defect, oriented appropriately and then sutured into place.  The secondary defect was then repaired using a primary closure.
Surgeon/Pathologist Verbiage (Will Incorporate Name Of Surgeon From Intro If Not Blank): operated in two distinct and integrated capacities as the surgeon and pathologist.
Full Thickness Lip Wedge Repair (Flap) Text: Given the location of the defect and the proximity to free margins a full thickness wedge repair was deemed most appropriate.  Using a sterile surgical marker, the appropriate repair was drawn incorporating the defect and placing the expected incisions perpendicular to the vermilion border.  The vermilion border was also meticulously outlined to ensure appropriate reapproximation during the repair.  The area thus outlined was incised through and through with a #15 scalpel blade.  The muscularis and dermis were reaproximated with deep sutures following hemostasis. Care was taken to realign the vermilion border before proceeding with the superficial closure.  Once the vermilion was realigned the superfical and mucosal closure was finished.
Previous Accession (Optional): outside pathology
Muscle Hinge Flap Text: The defect edges were debeveled with a #15 scalpel blade.  Given the size, depth and location of the defect and the proximity to free margins a muscle hinge flap was deemed most appropriate.  Using a sterile surgical marker, an appropriate hinge flap was drawn incorporating the defect. The area thus outlined was incised with a #15 scalpel blade.  The skin margins were undermined to an appropriate distance in all directions utilizing iris scissors.
X Size Of Lesion In Cm (Optional): 1.1
Non-Graft Cartilage Fenestration Text: The cartilage was fenestrated with a 2mm punch biopsy to help facilitate healing.
Alternatives Discussed Intro (Do Not Add Period): I discussed alternative treatments to Mohs surgery and specifically discussed the risks and benefits of
Surgical Defect Width In Cm (Optional): 1.4
Mauc Instructions: By selecting yes to the question below the MAUC number will be added into the note.  This will be calculated automatically based on the diagnosis chosen, the size entered, the body zone selected (H,M,L) and the specific indications you chose. You will also have the option to override the Mohs AUC if you disagree with the automatically calculated number and this option is found in the Case Summary tab.
Initial Size Of Lesion: 1.6
Keystone Flap Text: The defect edges were debeveled with a #15 scalpel blade.  Given the location of the defect, shape of the defect a keystone flap was deemed most appropriate.  Using a sterile surgical marker, an appropriate keystone flap was drawn incorporating the defect, outlining the appropriate donor tissue and placing the expected incisions within the relaxed skin tension lines where possible. The area thus outlined was incised deep to adipose tissue with a #15 scalpel blade.  The skin margins were undermined to an appropriate distance in all directions around the primary defect and laterally outward around the flap utilizing iris scissors.
Wound Check: 6 weeks
Alar Island Pedicle Flap Text: The defect edges were debeveled with a #15 scalpel blade.  Given the location of the defect, shape of the defect and the proximity to the alar rim an island pedicle advancement flap was deemed most appropriate.  Using a sterile surgical marker, an appropriate advancement flap was drawn incorporating the defect, outlining the appropriate donor tissue and placing the expected incisions within the nasal ala running parallel to the alar rim. The area thus outlined was incised with a #15 scalpel blade.  The skin margins were undermined minimally to an appropriate distance in all directions around the primary defect and laterally outward around the island pedicle utilizing iris scissors.  There was minimal undermining beneath the pedicle flap.
Double O-Z Plasty Text: The defect edges were debeveled with a #15 scalpel blade.  Given the location of the defect, shape of the defect and the proximity to free margins a Double O-Z plasty (double transposition flap) was deemed most appropriate.  Using a sterile surgical marker, the appropriate transposition flaps were drawn incorporating the defect and placing the expected incisions within the relaxed skin tension lines where possible. The area thus outlined was incised deep to adipose tissue with a #15 scalpel blade.  The skin margins were undermined to an appropriate distance in all directions utilizing iris scissors.  Hemostasis was achieved with electrocautery.  The flaps were then transposed into place, one clockwise and the other counterclockwise, and anchored with interrupted buried subcutaneous sutures.
Referring Physician (Optional): Hanh Roberts
Complex Repair And Flap Additional Text (Will Appearing After The Standard Complex Repair Text): The complex repair was not sufficient to completely close the primary defect. The remaining additional defect was repaired with the flap mentioned below.
Partial Purse String (Intermediate) Text: Given the location of the defect and the characteristics of the surrounding skin an intermediate purse string closure was deemed most appropriate.  Undermining was performed circumfirentially around the surgical defect.  A purse string suture was then placed and tightened. Wound tension only allowed a partial closure of the circular defect.
Chonodrocutaneous Helical Advancement Flap Text: The defect edges were debeveled with a #15 scalpel blade.  Given the location of the defect and the proximity to free margins a chondrocutaneous helical advancement flap was deemed most appropriate.  Using a sterile surgical marker, the appropriate advancement flap was drawn incorporating the defect and placing the expected incisions within the relaxed skin tension lines where possible.    The area thus outlined was incised deep to adipose tissue with a #15 scalpel blade.  The skin margins were undermined to an appropriate distance in all directions utilizing iris scissors.
H Plasty Text: Given the location of the defect, shape of the defect and the proximity to free margins a H-plasty was deemed most appropriate for repair.  Using a sterile surgical marker, the appropriate advancement arms of the H-plasty were drawn incorporating the defect and placing the expected incisions within the relaxed skin tension lines where possible. The area thus outlined was incised deep to adipose tissue with a #15 scalpel blade. The skin margins were undermined to an appropriate distance in all directions utilizing iris scissors.  The opposing advancement arms were then advanced into place in opposite direction and anchored with interrupted buried subcutaneous sutures.
Bcc Infiltrative Histology Text: There were numerous aggregates of basaloid cells demonstrating an infiltrative pattern.
Helical Rim Advancement Flap Text: The defect edges were debeveled with a #15 blade scalpel.  Given the location of the defect and the proximity to free margins (helical rim) a double helical rim advancement flap was deemed most appropriate.  Using a sterile surgical marker, the appropriate advancement flaps were drawn incorporating the defect and placing the expected incisions between the helical rim and antihelix where possible.  The area thus outlined was incised through and through with a #15 scalpel blade.  With a skin hook and iris scissors, the flaps were gently and sharply undermined and freed up.
Tissue Cultured Epidermal Autograft Text: The defect edges were debeveled with a #15 scalpel blade.  Given the location of the defect, shape of the defect and the proximity to free margins a tissue cultured epidermal autograft was deemed most appropriate.  The graft was then trimmed to fit the size of the defect.  The graft was then placed in the primary defect and oriented appropriately.
Rhomboid Transposition Flap Text: The defect edges were debeveled with a #15 scalpel blade.  Given the location of the defect and the proximity to free margins a rhomboid transposition flap was deemed most appropriate.  Using a sterile surgical marker, an appropriate rhomboid flap was drawn incorporating the defect.    The area thus outlined was incised deep to adipose tissue with a #15 scalpel blade.  The skin margins were undermined to an appropriate distance in all directions utilizing iris scissors.
Medical Necessity Statement: Based on my medical judgement, Mohs surgery is the most appropriate treatment for this cancer compared to other treatments.
Mercedes Flap Text: The defect edges were debeveled with a #15 scalpel blade.  Given the location of the defect, shape of the defect and the proximity to free margins a Mercedes flap was deemed most appropriate.  Using a sterile surgical marker, an appropriate advancement flap was drawn incorporating the defect and placing the expected incisions within the relaxed skin tension lines where possible. The area thus outlined was incised deep to adipose tissue with a #15 scalpel blade.  The skin margins were undermined to an appropriate distance in all directions utilizing iris scissors.
Island Pedicle Flap With Canthal Suspension Text: The defect edges were debeveled with a #15 scalpel blade.  Given the location of the defect, shape of the defect and the proximity to free margins an island pedicle advancement flap was deemed most appropriate.  Using a sterile surgical marker, an appropriate advancement flap was drawn incorporating the defect, outlining the appropriate donor tissue and placing the expected incisions within the relaxed skin tension lines where possible. The area thus outlined was incised deep to adipose tissue with a #15 scalpel blade.  The skin margins were undermined to an appropriate distance in all directions around the primary defect and laterally outward around the island pedicle utilizing iris scissors.  There was minimal undermining beneath the pedicle flap. A suspension suture was placed in the canthal tendon to prevent tension and prevent ectropion.
Burow's Graft Text: The defect edges were debeveled with a #15 scalpel blade.  Given the location of the defect, shape of the defect, the proximity to free margins and the presence of a standing cone deformity a Burow's skin graft was deemed most appropriate. The standing cone was removed and this tissue was then trimmed to the shape of the primary defect. The adipose tissue was also removed until only dermis and epidermis were left.  The skin margins of the secondary defect were undermined to an appropriate distance in all directions utilizing iris scissors.  The secondary defect was closed with interrupted buried subcutaneous sutures.  The skin edges were then re-apposed with running  sutures.  The skin graft was then placed in the primary defect and oriented appropriately.
Mart-1 - Positive Histology Text: MART-1 staining demonstrates areas of higher density and clustering of melanocytes with Pagetoid spread upwards within the epidermis. The surgical margins are positive for tumor cells.
Partial Purse String (Simple) Text: Given the location of the defect and the characteristics of the surrounding skin a simple purse string closure was deemed most appropriate.  Undermining was performed circumfirentially around the surgical defect.  A purse string suture was then placed and tightened. Wound tension only allowed a partial closure of the circular defect.
Staged Advancement Flap Text: The defect edges were debeveled with a #15 scalpel blade.  Given the location of the defect, shape of the defect and the proximity to free margins a staged advancement flap was deemed most appropriate.  Using a sterile surgical marker, an appropriate advancement flap was drawn incorporating the defect and placing the expected incisions within the relaxed skin tension lines where possible. The area thus outlined was incised deep to adipose tissue with a #15 scalpel blade.  The skin margins were undermined to an appropriate distance in all directions utilizing iris scissors.

## 2021-07-26 ENCOUNTER — APPOINTMENT (RX ONLY)
Dept: URBAN - METROPOLITAN AREA CLINIC 36 | Facility: CLINIC | Age: 56
Setting detail: DERMATOLOGY
End: 2021-07-26

## 2021-07-26 DIAGNOSIS — Z48.02 ENCOUNTER FOR REMOVAL OF SUTURES: ICD-10-CM

## 2021-07-26 PROCEDURE — ? SUTURE REMOVAL (GLOBAL PERIOD)

## 2021-07-26 PROCEDURE — 99024 POSTOP FOLLOW-UP VISIT: CPT

## 2021-07-26 ASSESSMENT — LOCATION ZONE DERM: LOCATION ZONE: FACE

## 2021-07-26 ASSESSMENT — LOCATION DETAILED DESCRIPTION DERM: LOCATION DETAILED: RIGHT SUPERIOR PREAURICULAR CHEEK

## 2021-07-26 ASSESSMENT — LOCATION SIMPLE DESCRIPTION DERM: LOCATION SIMPLE: RIGHT CHEEK

## 2021-07-26 NOTE — PROCEDURE: SUTURE REMOVAL (GLOBAL PERIOD)
Detail Level: Simple
Add 39586 Cpt? (Important Note: In 2017 The Use Of 70971 Is Being Tracked By Cms To Determine Future Global Period Reimbursement For Global Periods): yes

## 2021-07-27 ENCOUNTER — OFFICE VISIT (OUTPATIENT)
Dept: MEDICAL GROUP | Facility: MEDICAL CENTER | Age: 56
End: 2021-07-27
Payer: COMMERCIAL

## 2021-07-27 VITALS
RESPIRATION RATE: 16 BRPM | DIASTOLIC BLOOD PRESSURE: 88 MMHG | HEART RATE: 88 BPM | OXYGEN SATURATION: 98 % | TEMPERATURE: 96.9 F | WEIGHT: 304.2 LBS | BODY MASS INDEX: 43.65 KG/M2 | SYSTOLIC BLOOD PRESSURE: 154 MMHG

## 2021-07-27 DIAGNOSIS — I10 ESSENTIAL HYPERTENSION: ICD-10-CM

## 2021-07-27 DIAGNOSIS — G47.26 SHIFT WORK SLEEP DISORDER: ICD-10-CM

## 2021-07-27 DIAGNOSIS — E78.5 DYSLIPIDEMIA: ICD-10-CM

## 2021-07-27 DIAGNOSIS — L30.9 DERMATITIS: ICD-10-CM

## 2021-07-27 DIAGNOSIS — E11.9 TYPE 2 DIABETES MELLITUS WITHOUT COMPLICATION, WITHOUT LONG-TERM CURRENT USE OF INSULIN (HCC): ICD-10-CM

## 2021-07-27 DIAGNOSIS — E66.01 MORBID OBESITY WITH BMI OF 40.0-44.9, ADULT (HCC): ICD-10-CM

## 2021-07-27 LAB
HBA1C MFR BLD: 6.6 % (ref 0–5.6)
INT CON NEG: NEGATIVE
INT CON POS: POSITIVE

## 2021-07-27 PROCEDURE — 83036 HEMOGLOBIN GLYCOSYLATED A1C: CPT | Performed by: STUDENT IN AN ORGANIZED HEALTH CARE EDUCATION/TRAINING PROGRAM

## 2021-07-27 PROCEDURE — 99214 OFFICE O/P EST MOD 30 MIN: CPT | Performed by: STUDENT IN AN ORGANIZED HEALTH CARE EDUCATION/TRAINING PROGRAM

## 2021-07-27 RX ORDER — ZOLPIDEM TARTRATE 10 MG/1
10 TABLET ORAL NIGHTLY PRN
Qty: 30 TABLET | Refills: 0 | Status: SHIPPED | OUTPATIENT
Start: 2021-09-25 | End: 2021-10-25

## 2021-07-27 RX ORDER — ZOLPIDEM TARTRATE 10 MG/1
TABLET ORAL
COMMUNITY
Start: 2021-05-31 | End: 2021-07-27 | Stop reason: SDUPTHER

## 2021-07-27 RX ORDER — HYDROXYZINE HYDROCHLORIDE 25 MG/1
TABLET, FILM COATED ORAL
Qty: 90 TABLET | Refills: 3 | Status: SHIPPED | OUTPATIENT
Start: 2021-07-27 | End: 2022-02-14

## 2021-07-27 RX ORDER — LOSARTAN POTASSIUM 50 MG/1
50 TABLET ORAL DAILY
Qty: 90 TABLET | Refills: 3 | Status: SHIPPED | OUTPATIENT
Start: 2021-07-27 | End: 2022-08-21 | Stop reason: SDUPTHER

## 2021-07-27 RX ORDER — ZOLPIDEM TARTRATE 10 MG/1
10 TABLET ORAL NIGHTLY PRN
Qty: 30 TABLET | Refills: 0 | Status: SHIPPED | OUTPATIENT
Start: 2021-08-26 | End: 2021-09-25

## 2021-07-27 RX ORDER — ATORVASTATIN CALCIUM 20 MG/1
20 TABLET, FILM COATED ORAL DAILY
Qty: 90 TABLET | Refills: 3 | Status: SHIPPED
Start: 2021-07-27 | End: 2022-06-28

## 2021-07-27 RX ORDER — ZOLPIDEM TARTRATE 10 MG/1
10 TABLET ORAL NIGHTLY PRN
Qty: 30 TABLET | Refills: 0 | Status: SHIPPED | OUTPATIENT
Start: 2021-07-27 | End: 2021-08-26

## 2021-07-27 RX ORDER — AMLODIPINE BESYLATE 10 MG/1
10 TABLET ORAL DAILY
Qty: 90 TABLET | Refills: 3 | Status: SHIPPED | OUTPATIENT
Start: 2021-07-27 | End: 2022-09-12

## 2021-07-27 NOTE — PROGRESS NOTES
Subjective:     CC: Diabetes, hypertension follow-up    HPI:   Braden presents today with    Type 2 diabetes mellitus without complication, without long-term current use of insulin ([facility])  At last visit in March patient's A1c at 6.9% without medication.  At that appointment patient was placed on Metformin 500 mg twice daily.  Patient has been taking this as prescribed and A1c today at 6.6%.  Patient requesting change in medication from Metformin due to diarrhea.  Patient states that the 500 mg he takes at night causes him to wake up in the early morning with abdominal pain and diarrhea.    Essential hypertension  Blood pressure elevated in the office today.  Patient continues on amlodipine 10 mg and losartan 50 mg.  Patient states that he has been seen by [facility] 4 times over the last 4 months and has not had a problem with blood pressure readings.    Morbid obesity with BMI of 40.0-44.9, adult ([facility])  BMI remains elevated at 43.65.  Patient is seeing Dr. Whiting with [facility] for a gastric sleeve procedure.  Patient is on track to receive a gastric sleeve within the next 2 to 3 months.  Patient is hopeful that losing weight will improve his chronic health conditions and is motivated to do so.  Patient states that he has already started making dietary changes.    Dyslipidemia  Patient continues on atorvastatin 20 mg.    Shift work sleep disorder  Continues to switch around his work/sleep schedule frequently and uses Ambien as needed for this. No aberrant or addictive use of medications has been observed.  No adverse events have been reported.  Patient counseled to keep medications locked up or under personal control.  [facility] board of pharmacy interface is reviewed.  No inconsistencies are found.       Current Outpatient Medications Ordered in Epic   Medication Sig Dispense Refill   • zolpidem (AMBIEN) 10 MG Tab      • atorvastatin (LIPITOR) 20 MG Tab Take 1 tablet by mouth every day. 90 tablet 3   •  aspirin EC (ECOTRIN) 81 MG Tablet Delayed Response Take 1 tablet by mouth every day. 30 tablet 11   • losartan (COZAAR) 50 MG Tab Take 1 tablet by mouth every day. 90 tablet 3   • amLODIPine (NORVASC) 10 MG Tab Take 1 tablet by mouth every day. 90 tablet 3   • hydrOXYzine HCl (ATARAX) 25 MG Tab TAKE 1 TABLET BY MOUTH 3  TIMES DAILY AS NEEDED FOR  ITCHING /ANXIETY 90 tablet 3   • Semaglutide,0.25 or 0.5MG/DOS, 2 MG/1.5ML Solution Pen-injector Inject 0.25 mg under the skin every 7 days. 1.5 mL 11   • metFORMIN ER (GLUCOPHAGE XR) 500 MG TABLET SR 24 HR Take 1 tablet by mouth 2 times a day. 180 tablet 3   • cyanocobalamin (VITAMIN B12) 1000 MCG Tab Take 1 Tab by mouth every day. 30 Tab 11   • Psyllium (METAMUCIL FIBER PO) Take  by mouth every day.       No current [facility]-ordered facility-administered medications on file.       Health Maintenance: Completed    ROS:  Gen: no fevers/chills  ENT: no sore throat  Pulm: no sob, no cough  CV: no chest pain, no palpitations  GI: no nausea/vomiting, no diarrhea  : no dysuria  MSk: no myalgias  Neuro: no headaches, no numbness/tingling        Objective:     Exam:  /88 (BP Location: Right arm, Patient Position: Sitting, BP Cuff Size: Adult)   Pulse 88   Temp 36.1 °C (96.9 °F)   Resp 16   Wt (!) 138 kg (304 lb 3.2 oz)   SpO2 98%   BMI 43.65 kg/m²  Body mass index is 43.65 kg/m².    Gen: Alert and oriented, No apparent distress.  Neck: Neck is supple without lymphadenopathy.  Lungs: Normal effort, CTA bilaterally, no wheezes, rhonchi, or rales  CV: Regular rate and rhythm. No murmurs, rubs, or gallops.  Ext: No clubbing, cyanosis, edema.      Assessment & Plan:     56 y.o. male with the following -     1. Type 2 diabetes mellitus without complication, without long-term current use of insulin (HCC)  Chronic, stable.  Patient A1c decreased to 6.6% from 6.9% after starting on Metformin 500 mg twice daily.  Patient is unable to tolerate Metformin due to GI side effects.   Patient encouraged to take Metformin 500 mg once daily and will trial Ozempic 0.25 mg once weekly to manage diabetes and help with weight loss, pending insurance coverage.  Patient encouraged to follow-up sooner if insurance does not cover this medication.  - POCT  A1C  - aspirin EC (ECOTRIN) 81 MG Tablet Delayed Response; Take 1 tablet by mouth every day.  Dispense: 30 tablet; Refill: 11    2. Dyslipidemia  Chronic, stable.  Patient continues on atorvastatin.  - atorvastatin (LIPITOR) 20 MG Tab; Take 1 tablet by mouth every day.  Dispense: 90 tablet; Refill: 3    3. Essential hypertension  Chronic, stable.  Patient blood pressure is elevated today in office.  Patient states that he has been following up with his surgical and blood pressures have been well controlled.  Patient encouraged to continue watching blood pressure and if it remains elevated we may need to increase losartan.  - losartan (COZAAR) 50 MG Tab; Take 1 tablet by mouth every day.  Dispense: 90 tablet; Refill: 3  - amLODIPine (NORVASC) 10 MG Tab; Take 1 tablet by mouth every day.  Dispense: 90 tablet; Refill: 3    4. Dermatitis  Chronic, stable.  Patient continues on hydroxyzine for chronically itchy skin.  Patient states he takes it once nightly significantly improves itch and irritation.  - hydrOXYzine HCl (ATARAX) 25 MG Tab; TAKE 1 TABLET BY MOUTH 3  TIMES DAILY AS NEEDED FOR  ITCHING /ANXIETY  Dispense: 90 tablet; Refill: 3      Return in about 6 months (around 1/27/2022).    Please note that this dictation was created using voice recognition software. I have made every reasonable attempt to correct obvious errors, but I expect that there are errors of grammar and possibly content that I did not discover before finalizing the note.

## 2021-07-27 NOTE — ASSESSMENT & PLAN NOTE
BMI remains elevated at 43.65.  Patient is seeing Dr. Whiting with [facility] for a gastric sleeve procedure.  Patient is on track to receive a gastric sleeve within the next 2 to 3 months.  Patient is hopeful that losing weight will improve his chronic health conditions and is motivated to do so.  Patient states that he has already started making dietary changes.

## 2021-07-27 NOTE — ASSESSMENT & PLAN NOTE
Continues to switch around his work/sleep schedule frequently and uses Ambien as needed for this. No aberrant or addictive use of medications has been observed.  No adverse events have been reported.  Patient counseled to keep medications locked up or under personal control.  [facility] board of pharmacy interface is reviewed.  No inconsistencies are found.

## 2021-07-27 NOTE — ASSESSMENT & PLAN NOTE
At last visit in March patient's A1c at 6.9% without medication.  At that appointment patient was placed on Metformin 500 mg twice daily.  Patient has been taking this as prescribed and A1c today at 6.6%.  Patient requesting change in medication from Metformin due to diarrhea.  Patient states that the 500 mg he takes at night causes him to wake up in the early morning with abdominal pain and diarrhea.

## 2021-08-10 ENCOUNTER — HOSPITAL ENCOUNTER (OUTPATIENT)
Dept: RADIOLOGY | Facility: MEDICAL CENTER | Age: 56
End: 2021-08-10
Attending: SURGERY
Payer: COMMERCIAL

## 2021-08-10 DIAGNOSIS — K21.9 GASTROESOPHAGEAL REFLUX DISEASE, UNSPECIFIED WHETHER ESOPHAGITIS PRESENT: ICD-10-CM

## 2021-08-10 PROCEDURE — 74240 X-RAY XM UPR GI TRC 1CNTRST: CPT

## 2021-12-29 ENCOUNTER — HOSPITAL ENCOUNTER (OUTPATIENT)
Facility: MEDICAL CENTER | Age: 56
End: 2021-12-29
Attending: STUDENT IN AN ORGANIZED HEALTH CARE EDUCATION/TRAINING PROGRAM
Payer: COMMERCIAL

## 2021-12-29 ENCOUNTER — OFFICE VISIT (OUTPATIENT)
Dept: URGENT CARE | Facility: PHYSICIAN GROUP | Age: 56
End: 2021-12-29
Payer: COMMERCIAL

## 2021-12-29 VITALS
SYSTOLIC BLOOD PRESSURE: 156 MMHG | HEIGHT: 70 IN | WEIGHT: 265 LBS | HEART RATE: 92 BPM | BODY MASS INDEX: 37.94 KG/M2 | RESPIRATION RATE: 16 BRPM | DIASTOLIC BLOOD PRESSURE: 72 MMHG | OXYGEN SATURATION: 95 % | TEMPERATURE: 98.6 F

## 2021-12-29 DIAGNOSIS — Z20.822 COVID-19 RULED OUT: ICD-10-CM

## 2021-12-29 DIAGNOSIS — J06.9 VIRAL URI WITH COUGH: ICD-10-CM

## 2021-12-29 DIAGNOSIS — I10 ESSENTIAL HYPERTENSION: ICD-10-CM

## 2021-12-29 LAB — COVID ORDER STATUS COVID19: NORMAL

## 2021-12-29 PROCEDURE — 99214 OFFICE O/P EST MOD 30 MIN: CPT | Mod: CS | Performed by: STUDENT IN AN ORGANIZED HEALTH CARE EDUCATION/TRAINING PROGRAM

## 2021-12-29 PROCEDURE — U0005 INFEC AGEN DETEC AMPLI PROBE: HCPCS

## 2021-12-29 PROCEDURE — U0003 INFECTIOUS AGENT DETECTION BY NUCLEIC ACID (DNA OR RNA); SEVERE ACUTE RESPIRATORY SYNDROME CORONAVIRUS 2 (SARS-COV-2) (CORONAVIRUS DISEASE [COVID-19]), AMPLIFIED PROBE TECHNIQUE, MAKING USE OF HIGH THROUGHPUT TECHNOLOGIES AS DESCRIBED BY CMS-2020-01-R: HCPCS

## 2021-12-29 NOTE — PROGRESS NOTES
Subjective:   CHIEF COMPLAINT  Chief Complaint   Patient presents with   • Coronavirus Screening     Cough, headache, runny nose, GI problems, bodyaches ongoing 1 day.  Wife is coivd positive.       HPI  Braden Cruz is a 56 y.o. male who presents with a chief complaint of sore throat, headache, cough, nausea and decreased appetite for about 3 days.  Symptoms worsened overnight.  Wife tested positive for Covid.  He has tried Advil and NyQuil.  Positive ROS for shortness of breath and loose stools.  No wheezing or abdominal pain. He is immunizing against Covid.    REVIEW OF SYSTEMS  General: no fever or chills  GI: no nausea or vomiting  See HPI for further details.     PAST MEDICAL HISTORY  Patient Active Problem List    Diagnosis Date Noted   • Dyslipidemia 01/22/2021   • Vitamin B12 deficiency 08/28/2019   • Chronic pain of both knees 04/01/2019   • Type 2 diabetes mellitus without complication, without long-term current use of insulin ([facility]) 12/13/2018   • Shift work sleep disorder 09/12/2018   • Morbid obesity with BMI of 40.0-44.9, adult ([facility]) 09/12/2018   • Essential hypertension 09/11/2017       SURGICAL HISTORY   has a past surgical history that includes fusion, spine, lumbar, plif (1999, 2001); knee arthroscopy (Right, 2006); knee arthroscopy (Bilateral, 5/30/2019); and meniscectomy, knee, medial (Bilateral, 5/30/2019).    ALLERGIES  No Known Allergies    CURRENT MEDICATIONS  Home Medications     Reviewed by Adan Rodriguez't (Medical Assistant) on 12/29/21 at 0813  Med List Status: <None>   Medication Last Dose Status   amLODIPine (NORVASC) 10 MG Tab Taking Active   aspirin EC (ECOTRIN) 81 MG Tablet Delayed Response Taking Active   atorvastatin (LIPITOR) 20 MG Tab Taking Active   cyanocobalamin (VITAMIN B12) 1000 MCG Tab Taking Active   hydrOXYzine HCl (ATARAX) 25 MG Tab Taking Active   losartan (COZAAR) 50 MG Tab Taking Active   metFORMIN ER (GLUCOPHAGE XR) 500 MG TABLET SR 24 HR Taking  "Active   Psyllium (METAMUCIL FIBER PO) Taking Active   Semaglutide,0.25 or 0.5MG/DOS, 2 MG/1.5ML Solution Pen-injector Taking Active                SOCIAL HISTORY  Social History     Tobacco Use   • Smoking status: Never Smoker   • Smokeless tobacco: Never Used   Vaping Use   • Vaping Use: Never used   Substance and Sexual Activity   • Alcohol use: Yes     Comment: 1-2 per day   • Drug use: No   • Sexual activity: Yes     Partners: Female       FAMILY HISTORY  Family History   Problem Relation Age of Onset   • No Known Problems Mother    • Hypertension Father    • No Known Problems Sister    • No Known Problems Brother    • Heart Attack Maternal Uncle    • No Known Problems Maternal Grandmother    • Heart Attack Maternal Grandfather    • Heart Attack Paternal Grandmother    • No Known Problems Paternal Grandfather           Objective:   PHYSICAL EXAM  VITAL SIGNS: /72   Pulse 92   Temp 37 °C (98.6 °F) (Temporal)   Resp 16   Ht 1.778 m (5' 10\")   Wt 120 kg (265 lb)   SpO2 95%   BMI 38.02 kg/m²     Gen: no acute distress, normal voice  Skin: dry, intact, moist mucosal membranes  Lungs: CTAB w/ symmetric expansion  CV: RRR w/o murmurs or clicks  Psych: normal affect, normal judgement, alert, awake    Assessment/Plan:     1. Viral URI with cough  COVID/SARS CoV-2 PCR   2. COVID-19 ruled out  COVID/SARS CoV-2 PCR   3. Essential hypertension     1-2) signs and symptoms are consistent with a viral upper respiratory tract infection. Pt is immunized against covid.   -Ordered Covid.  Results will be sent through [employer].  -Instructed to quarantine until Covid results have been returned  -Encouraged hydration and symptomatic treatment with Tylenol/ibuprofen prn  -Discussed red flags and return precautions.  Patient verbalized their understanding.  All questions were answered.    3) chronic issue.  BP currently 156/72 and uncontrolled.  Patient is managing with losartan and amlodipine.  I encouraged him to " follow-up with his primary care for reevaluation and would likely need some adjustments with his medication for better control.    Differential diagnosis, natural history, supportive care, and indications for immediate follow-up discussed. Patient agrees with the plan of care.    Follow-up as needed if symptoms worsen or fail to improve to PCP, Urgent care or Emergency Room.       Please note that this dictation was created using voice recognition software. I have made a reasonable attempt to correct obvious errors, but I expect that there are errors of grammar and possibly content that I did not discover before finalizing the note.

## 2021-12-30 LAB
SARS-COV-2 RNA RESP QL NAA+PROBE: DETECTED
SPECIMEN SOURCE: ABNORMAL

## 2022-01-03 DIAGNOSIS — U07.1 LAB TEST POSITIVE FOR DETECTION OF COVID-19 VIRUS: ICD-10-CM

## 2022-01-03 NOTE — PROGRESS NOTES
Had a positive Covid test on December 29.  Is feeling much better and needs a follow-up negative Covid test in order to return to work.  He is trying to schedule this.  Order placed.

## 2022-01-04 ENCOUNTER — HOSPITAL ENCOUNTER (OUTPATIENT)
Facility: MEDICAL CENTER | Age: 57
End: 2022-01-04
Attending: FAMILY MEDICINE
Payer: COMMERCIAL

## 2022-01-04 ENCOUNTER — OFFICE VISIT (OUTPATIENT)
Dept: MEDICAL GROUP | Facility: MEDICAL CENTER | Age: 57
End: 2022-01-04
Payer: COMMERCIAL

## 2022-01-04 VITALS
HEART RATE: 95 BPM | BODY MASS INDEX: 38.42 KG/M2 | TEMPERATURE: 97 F | DIASTOLIC BLOOD PRESSURE: 80 MMHG | WEIGHT: 268.4 LBS | OXYGEN SATURATION: 96 % | HEIGHT: 70 IN | SYSTOLIC BLOOD PRESSURE: 136 MMHG

## 2022-01-04 DIAGNOSIS — U07.1 LAB TEST POSITIVE FOR DETECTION OF COVID-19 VIRUS: ICD-10-CM

## 2022-01-04 DIAGNOSIS — E11.9 TYPE 2 DIABETES MELLITUS WITHOUT COMPLICATION, WITHOUT LONG-TERM CURRENT USE OF INSULIN (HCC): ICD-10-CM

## 2022-01-04 LAB
HBA1C MFR BLD: 5.5 % (ref 0–5.6)
INT CON NEG: POSITIVE
INT CON POS: NEGATIVE

## 2022-01-04 PROCEDURE — U0003 INFECTIOUS AGENT DETECTION BY NUCLEIC ACID (DNA OR RNA); SEVERE ACUTE RESPIRATORY SYNDROME CORONAVIRUS 2 (SARS-COV-2) (CORONAVIRUS DISEASE [COVID-19]), AMPLIFIED PROBE TECHNIQUE, MAKING USE OF HIGH THROUGHPUT TECHNOLOGIES AS DESCRIBED BY CMS-2020-01-R: HCPCS

## 2022-01-04 PROCEDURE — U0005 INFEC AGEN DETEC AMPLI PROBE: HCPCS

## 2022-01-04 PROCEDURE — 99213 OFFICE O/P EST LOW 20 MIN: CPT | Mod: CS | Performed by: FAMILY MEDICINE

## 2022-01-04 PROCEDURE — 83036 HEMOGLOBIN GLYCOSYLATED A1C: CPT | Performed by: FAMILY MEDICINE

## 2022-01-04 ASSESSMENT — PATIENT HEALTH QUESTIONNAIRE - PHQ9: CLINICAL INTERPRETATION OF PHQ2 SCORE: 0

## 2022-01-04 NOTE — ASSESSMENT & PLAN NOTE
A1c today was 5.5, congratulated patient that is fantastic.  Continue medication as prescribed.  Have him return in 3 months for full diabetic visit.

## 2022-01-04 NOTE — ASSESSMENT & PLAN NOTE
Covid PCR test taken  Discussed with patient that guidelines are not to get a negative test before returning to work as they can stay positive for quite some time.  Per new CDC guidelines he is to quarantine for 5 days which she is accomplished once he is asymptomatic he can be around people as long as he wears a mask which is currently public health order in this area anyway.  Patient tells me he does not need a work note at this time.  We will see results on my chart, if he needs further documentation and clarification of these guidelines for his workplace I will be happy to write that letter.

## 2022-01-04 NOTE — PROGRESS NOTES
"Subjective:     CC: \"Covid test, A1c\"    HPI:   Braden presents today with dilation and management of:    Problem   Lab Test Positive for Detection of Covid-19 Virus    Positive for Covid December 29, he tells me that he did not really have symptoms when he got tested.  His wife was found to be positive for Covid on the 27th and reports that 7 out of 10 of her office mates are also positive around this time.  This is what prompted him to get tested.  He had since developed a cough and sore throat pain described as mild cold symptoms.  He denies ever having fevers, chills, body aches, nausea, vomiting, diarrhea, loss of smell, loss of taste.  He occasionally has a lingering cough but is feeling much better.  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     Type 2 Diabetes Mellitus Without Complication, Without Long-Term Current Use of Insulin ([facility])    HA1C 6.6 12/2018.  Patient has been on Metformin 500 mg daily and taking Ozempic 0.5 mg weekly.  He reports that he has lost 60 pounds since the summer just with good diet modification.  He is feeling much better and is hoping his A1c is going to show this result as well.         Current Outpatient Medications Ordered in Epic   Medication Sig Dispense Refill   • Semaglutide,0.25 or 0.5MG/DOS, 2 MG/1.5ML Solution Pen-injector Inject 0.5 mg under the skin every 7 days. 1.5 mL 11   • atorvastatin (LIPITOR) 20 MG Tab Take 1 tablet by mouth every day. 90 tablet 3   • aspirin EC (ECOTRIN) 81 MG Tablet Delayed Response Take 1 tablet by mouth every day. 30 tablet 11   • losartan (COZAAR) 50 MG Tab Take 1 tablet by mouth every day. 90 tablet 3   • amLODIPine (NORVASC) 10 MG Tab Take 1 tablet by mouth every day. 90 tablet 3   • hydrOXYzine HCl (ATARAX) 25 MG Tab TAKE 1 TABLET BY MOUTH 3  TIMES DAILY AS NEEDED FOR  ITCHING /ANXIETY 90 tablet 3   • metFORMIN ER (GLUCOPHAGE XR) 500 MG TABLET SR 24 HR Take 1 tablet by mouth 2 times a day. 180 " "tablet 3   • cyanocobalamin (VITAMIN B12) 1000 MCG Tab Take 1 Tab by mouth every day. 30 Tab 11   • Psyllium (METAMUCIL FIBER PO) Take  by mouth every day.       No current [facility]-ordered facility-administered medications on file.       Health Maintenance: Covid vaccines updated in chart, patient due for booster    ROS:  ROS see HPI    Objective:     Exam:  /80   Pulse 95   Temp 36.1 °C (97 °F) (Temporal)   Ht 1.778 m (5' 10\")   Wt 122 kg (268 lb 6.4 oz)   SpO2 96%   BMI 38.51 kg/m²  Body mass index is 38.51 kg/m².    Physical Exam  Vitals reviewed.   Constitutional:       General: He is not in acute distress.     Appearance: Normal appearance. He is obese. He is not ill-appearing.   HENT:      Head: Normocephalic and atraumatic.   Cardiovascular:      Rate and Rhythm: Normal rate and regular rhythm.      Heart sounds: Normal heart sounds.   Pulmonary:      Effort: Pulmonary effort is normal.      Breath sounds: Normal breath sounds.   Neurological:      Mental Status: He is alert. Mental status is at baseline.   Psychiatric:         Mood and Affect: Mood normal.         Behavior: Behavior normal.           Labs: POCT A1c 5.5    Assessment & Plan:     56 y.o. male with the following -     Problem List Items Addressed This Visit     Type 2 diabetes mellitus without complication, without long-term current use of insulin ([facility])     A1c today was 5.5, congratulated patient that is fantastic.  Continue medication as prescribed.  Have him return in 3 months for full diabetic visit.         Relevant Orders    POCT Hemoglobin A1C (Completed)    Lab test positive for detection of COVID-19 virus     Covid PCR test taken  Discussed with patient that guidelines are not to get a negative test before returning to work as they can stay positive for quite some time.  Per new CDC guidelines he is to quarantine for 5 days which she is accomplished once he is asymptomatic he can be around people as long as he wears a mask " which is currently public health order in this area anyway.  Patient tells me he does not need a work note at this time.  We will see results on my chart, if he needs further documentation and clarification of these guidelines for his workplace I will be happy to write that letter.                 Return in about 3 months (around 4/4/2022).    Please note that this dictation was created using voice recognition software. I have made every reasonable attempt to correct obvious errors, but I expect that there are errors of grammar and possibly content that I did not discover before finalizing the note.

## 2022-01-05 DIAGNOSIS — U07.1 LAB TEST POSITIVE FOR DETECTION OF COVID-19 VIRUS: ICD-10-CM

## 2022-01-05 LAB — COVID ORDER STATUS COVID19: NORMAL

## 2022-01-06 LAB
SARS-COV-2 RNA RESP QL NAA+PROBE: DETECTED
SPECIMEN SOURCE: ABNORMAL

## 2022-02-13 DIAGNOSIS — L30.9 DERMATITIS: ICD-10-CM

## 2022-02-14 RX ORDER — HYDROXYZINE HYDROCHLORIDE 25 MG/1
TABLET, FILM COATED ORAL
Qty: 90 TABLET | Refills: 5 | Status: SHIPPED | OUTPATIENT
Start: 2022-02-14 | End: 2022-12-30 | Stop reason: SDUPTHER

## 2022-04-25 ENCOUNTER — APPOINTMENT (OUTPATIENT)
Dept: MEDICAL GROUP | Facility: MEDICAL CENTER | Age: 57
End: 2022-04-25
Payer: COMMERCIAL

## 2022-05-02 ENCOUNTER — TELEPHONE (OUTPATIENT)
Dept: MEDICAL GROUP | Facility: MEDICAL CENTER | Age: 57
End: 2022-05-02
Payer: COMMERCIAL

## 2022-05-02 DIAGNOSIS — E66.01 MORBID OBESITY WITH BMI OF 40.0-44.9, ADULT (HCC): ICD-10-CM

## 2022-05-02 DIAGNOSIS — I10 ESSENTIAL HYPERTENSION: ICD-10-CM

## 2022-05-02 DIAGNOSIS — E78.5 DYSLIPIDEMIA: ICD-10-CM

## 2022-05-02 DIAGNOSIS — Z12.5 PROSTATE CANCER SCREENING: ICD-10-CM

## 2022-05-02 DIAGNOSIS — E11.9 TYPE 2 DIABETES MELLITUS WITHOUT COMPLICATION, WITHOUT LONG-TERM CURRENT USE OF INSULIN (HCC): ICD-10-CM

## 2022-05-02 NOTE — TELEPHONE ENCOUNTER
I will place lab orders and he wanted to give them a call and give him a heads up that he can get his labs done and come and see me.  If he needs a refill in the interim Find out what he needs and we will make sure he has enough prior to getting his labs and see me done.

## 2022-05-12 ENCOUNTER — HOSPITAL ENCOUNTER (OUTPATIENT)
Dept: LAB | Facility: MEDICAL CENTER | Age: 57
End: 2022-05-12
Attending: FAMILY MEDICINE
Payer: COMMERCIAL

## 2022-05-12 DIAGNOSIS — Z12.5 PROSTATE CANCER SCREENING: ICD-10-CM

## 2022-05-12 DIAGNOSIS — E11.9 TYPE 2 DIABETES MELLITUS WITHOUT COMPLICATION, WITHOUT LONG-TERM CURRENT USE OF INSULIN (HCC): ICD-10-CM

## 2022-05-12 DIAGNOSIS — I10 ESSENTIAL HYPERTENSION: ICD-10-CM

## 2022-05-12 DIAGNOSIS — E78.5 DYSLIPIDEMIA: ICD-10-CM

## 2022-05-12 DIAGNOSIS — E66.01 MORBID OBESITY WITH BMI OF 40.0-44.9, ADULT (HCC): ICD-10-CM

## 2022-05-12 LAB
ALBUMIN SERPL BCP-MCNC: 4.2 G/DL (ref 3.2–4.9)
ALBUMIN/GLOB SERPL: 1.6 G/DL
ALP SERPL-CCNC: 101 U/L (ref 30–99)
ALT SERPL-CCNC: 19 U/L (ref 2–50)
ANION GAP SERPL CALC-SCNC: 11 MMOL/L (ref 7–16)
AST SERPL-CCNC: 19 U/L (ref 12–45)
BILIRUB SERPL-MCNC: 0.6 MG/DL (ref 0.1–1.5)
BUN SERPL-MCNC: 12 MG/DL (ref 8–22)
CALCIUM SERPL-MCNC: 8.9 MG/DL (ref 8.5–10.5)
CHLORIDE SERPL-SCNC: 102 MMOL/L (ref 96–112)
CHOLEST SERPL-MCNC: 219 MG/DL (ref 100–199)
CO2 SERPL-SCNC: 26 MMOL/L (ref 20–33)
CREAT SERPL-MCNC: 0.72 MG/DL (ref 0.5–1.4)
CREAT UR-MCNC: 127.34 MG/DL
ERYTHROCYTE [DISTWIDTH] IN BLOOD BY AUTOMATED COUNT: 46.9 FL (ref 35.9–50)
EST. AVERAGE GLUCOSE BLD GHB EST-MCNC: 120 MG/DL
FASTING STATUS PATIENT QL REPORTED: NORMAL
GFR SERPLBLD CREATININE-BSD FMLA CKD-EPI: 106 ML/MIN/1.73 M 2
GLOBULIN SER CALC-MCNC: 2.6 G/DL (ref 1.9–3.5)
GLUCOSE SERPL-MCNC: 111 MG/DL (ref 65–99)
HBA1C MFR BLD: 5.8 % (ref 4–5.6)
HCT VFR BLD AUTO: 51.3 % (ref 42–52)
HDLC SERPL-MCNC: 60 MG/DL
HGB BLD-MCNC: 17.5 G/DL (ref 14–18)
LDLC SERPL CALC-MCNC: 126 MG/DL
MCH RBC QN AUTO: 32.5 PG (ref 27–33)
MCHC RBC AUTO-ENTMCNC: 34.1 G/DL (ref 33.7–35.3)
MCV RBC AUTO: 95.4 FL (ref 81.4–97.8)
MICROALBUMIN UR-MCNC: <1.2 MG/DL
MICROALBUMIN/CREAT UR: NORMAL MG/G (ref 0–30)
PLATELET # BLD AUTO: 270 K/UL (ref 164–446)
PMV BLD AUTO: 8.9 FL (ref 9–12.9)
POTASSIUM SERPL-SCNC: 4.6 MMOL/L (ref 3.6–5.5)
PROT SERPL-MCNC: 6.8 G/DL (ref 6–8.2)
PSA SERPL-MCNC: 0.84 NG/ML (ref 0–4)
RBC # BLD AUTO: 5.38 M/UL (ref 4.7–6.1)
SODIUM SERPL-SCNC: 139 MMOL/L (ref 135–145)
TRIGL SERPL-MCNC: 163 MG/DL (ref 0–149)
VIT B12 SERPL-MCNC: 3064 PG/ML (ref 211–911)
WBC # BLD AUTO: 8.9 K/UL (ref 4.8–10.8)

## 2022-05-12 PROCEDURE — 80061 LIPID PANEL: CPT

## 2022-05-12 PROCEDURE — 80053 COMPREHEN METABOLIC PANEL: CPT

## 2022-05-12 PROCEDURE — 83036 HEMOGLOBIN GLYCOSYLATED A1C: CPT

## 2022-05-12 PROCEDURE — 82043 UR ALBUMIN QUANTITATIVE: CPT

## 2022-05-12 PROCEDURE — 84153 ASSAY OF PSA TOTAL: CPT

## 2022-05-12 PROCEDURE — 82570 ASSAY OF URINE CREATININE: CPT

## 2022-05-12 PROCEDURE — 82607 VITAMIN B-12: CPT

## 2022-05-12 PROCEDURE — 36415 COLL VENOUS BLD VENIPUNCTURE: CPT

## 2022-05-12 PROCEDURE — 85027 COMPLETE CBC AUTOMATED: CPT

## 2022-05-23 ENCOUNTER — APPOINTMENT (OUTPATIENT)
Dept: MEDICAL GROUP | Facility: MEDICAL CENTER | Age: 57
End: 2022-05-23
Payer: COMMERCIAL

## 2022-06-10 DIAGNOSIS — E11.9 TYPE 2 DIABETES MELLITUS WITHOUT COMPLICATION, WITHOUT LONG-TERM CURRENT USE OF INSULIN (HCC): ICD-10-CM

## 2022-06-10 RX ORDER — METFORMIN HYDROCHLORIDE 500 MG/1
500 TABLET, EXTENDED RELEASE ORAL 2 TIMES DAILY
Qty: 180 TABLET | Refills: 0 | Status: SHIPPED | OUTPATIENT
Start: 2022-06-10 | End: 2022-10-10 | Stop reason: SDUPTHER

## 2022-06-28 ENCOUNTER — OFFICE VISIT (OUTPATIENT)
Dept: MEDICAL GROUP | Facility: MEDICAL CENTER | Age: 57
End: 2022-06-28
Payer: COMMERCIAL

## 2022-06-28 VITALS
RESPIRATION RATE: 16 BRPM | BODY MASS INDEX: 40.94 KG/M2 | HEART RATE: 101 BPM | WEIGHT: 286 LBS | OXYGEN SATURATION: 93 % | HEIGHT: 70 IN | SYSTOLIC BLOOD PRESSURE: 130 MMHG | DIASTOLIC BLOOD PRESSURE: 70 MMHG

## 2022-06-28 DIAGNOSIS — Z12.11 COLON CANCER SCREENING: ICD-10-CM

## 2022-06-28 DIAGNOSIS — B35.1 ONYCHOMYCOSIS: ICD-10-CM

## 2022-06-28 DIAGNOSIS — I10 ESSENTIAL HYPERTENSION: ICD-10-CM

## 2022-06-28 DIAGNOSIS — E78.5 DYSLIPIDEMIA: ICD-10-CM

## 2022-06-28 DIAGNOSIS — E11.9 TYPE 2 DIABETES MELLITUS WITHOUT COMPLICATION, WITHOUT LONG-TERM CURRENT USE OF INSULIN (HCC): ICD-10-CM

## 2022-06-28 DIAGNOSIS — Z00.00 WELL ADULT EXAM: Primary | ICD-10-CM

## 2022-06-28 PROCEDURE — 99396 PREV VISIT EST AGE 40-64: CPT | Performed by: FAMILY MEDICINE

## 2022-06-28 RX ORDER — TERBINAFINE HYDROCHLORIDE 250 MG/1
250 TABLET ORAL DAILY
Qty: 30 TABLET | Refills: 3 | Status: SHIPPED | OUTPATIENT
Start: 2022-06-28 | End: 2022-12-19

## 2022-06-28 RX ORDER — ATORVASTATIN CALCIUM 40 MG/1
40 TABLET, FILM COATED ORAL NIGHTLY
Qty: 90 TABLET | Refills: 1 | Status: SHIPPED | OUTPATIENT
Start: 2022-06-28 | End: 2022-12-07 | Stop reason: SDUPTHER

## 2022-06-28 ASSESSMENT — FIBROSIS 4 INDEX: FIB4 SCORE: 0.94

## 2022-06-28 NOTE — ASSESSMENT & PLAN NOTE
Blood sugars seem to be in a good range.  We will plan on rechecking in 6 months.    No neuropathy noted on monofilament exam today.  Patient seeing eye doctor tomorrow request that he have records sent over to us.

## 2022-06-28 NOTE — PROGRESS NOTES
Subjective:     CC:   Chief Complaint   Patient presents with   • Annual Exam       HPI:   Braden Cruz is a 57 y.o. male who presents for an annual exam. He is feeling well and does complain of knee pain. Right one is worse, surgery was performed a few years ago.    Health Maintenance  Cholesterol Screening: Completed May 12, total cholesterol and LDL are above goal  Diabetes Screenin2022: A1c 5.8, good control of type 2 diabetes  AAA Screening: Not applicable  Aspirin Use: Patient is on 81 mg aspirin daily  Diet: Its OK, watching portions, snacks on seeds sometimes other things. Eating on the road does make this difficult.  Exercise: Knee pain is limiting, so does not get a lot of exercise.  Substance Abuse: no concerns  Safe in relationship.   Seat belts, bike helmet, gun safety discussed.  Sun protection used.    Cancer screening  Colorectal Cancer Screening: Overdue provided referral today  Lung Cancer Screening: N/A  Prostate Cancer Screening/PSA: Completed 2022 0.84    Infectious disease screening/Immunizations  --Practices safe sex.  --Hepatitis C Screenin2019: Negative  --Immunizations: Declines vaccines today is due for pneumonia shot due to being diabetic     He  has a past medical history of Hypertension, Lab test positive for detection of COVID-19 virus (1/3/2022), and Pain.  He  has a past surgical history that includes fusion, spine, lumbar, plif (, ); knee arthroscopy (Right, ); knee arthroscopy (Bilateral, 2019); and meniscectomy, knee, medial (Bilateral, 2019).  Family History   Problem Relation Age of Onset   • No Known Problems Mother    • Hypertension Father    • No Known Problems Sister    • No Known Problems Brother    • Heart Attack Maternal Uncle    • No Known Problems Maternal Grandmother    • Heart Attack Maternal Grandfather    • Heart Attack Paternal Grandmother    • No Known Problems Paternal Grandfather      Social History      Tobacco Use   • Smoking status: Never Smoker   • Smokeless tobacco: Never Used   Vaping Use   • Vaping Use: Never used   Substance Use Topics   • Alcohol use: Yes     Comment: 1-2 per day   • Drug use: No       Patient Active Problem List    Diagnosis Date Noted   • Onychomycosis 06/28/2022   • Lab test positive for detection of COVID-19 virus 01/03/2022   • Dyslipidemia 01/22/2021   • Vitamin B12 deficiency 08/28/2019   • Chronic pain of both knees 04/01/2019   • Type 2 diabetes mellitus without complication, without long-term current use of insulin ([facility]) 12/13/2018   • Shift work sleep disorder 09/12/2018   • Morbid obesity with BMI of 40.0-44.9, adult ([facility]) 09/12/2018   • Essential hypertension 09/11/2017       Current Outpatient Medications   Medication Sig Dispense Refill   • atorvastatin (LIPITOR) 40 MG Tab Take 1 Tablet by mouth every evening. 90 Tablet 1   • terbinafine (LAMISIL) 250 MG Tab Take 1 Tablet by mouth every day. 30 Tablet 3   • metFORMIN ER (GLUCOPHAGE XR) 500 MG TABLET SR 24 HR Take 1 Tablet by mouth 2 times a day. 180 Tablet 0   • hydrOXYzine HCl (ATARAX) 25 MG Tab TAKE ONE TABLET BY MOUTH THREE TIMES A DAY AS NEEDED FOR ITCHING / ANXIETY 90 Tablet 5   • Semaglutide,0.25 or 0.5MG/DOS, 2 MG/1.5ML Solution Pen-injector Inject 0.5 mg under the skin every 7 days. 1.5 mL 11   • aspirin EC (ECOTRIN) 81 MG Tablet Delayed Response Take 1 tablet by mouth every day. 30 tablet 11   • losartan (COZAAR) 50 MG Tab Take 1 tablet by mouth every day. 90 tablet 3   • amLODIPine (NORVASC) 10 MG Tab Take 1 tablet by mouth every day. 90 tablet 3   • cyanocobalamin (VITAMIN B12) 1000 MCG Tab Take 1 Tab by mouth every day. 30 Tab 11   • Psyllium (METAMUCIL FIBER PO) Take  by mouth every day.       No current facility-administered medications for this visit.    (including changes today)  Allergies: Patient has no known allergies.    Review of Systems   Constitutional: Negative for fever, chills and  "malaise/fatigue.   HENT: Negative for congestion.    Eyes: Negative for pain.   Respiratory: Negative for cough and shortness of breath.    Cardiovascular: Negative for leg swelling.   Gastrointestinal: Negative for nausea, vomiting, abdominal pain and diarrhea.   Genitourinary: Negative for dysuria and hematuria.   Skin: Negative for rash.   Neurological: Negative for dizziness, focal weakness and headaches.   Endo/Heme/Allergies: Does not bleed easily.   Psychiatric/Behavioral: Negative for depression.  The patient is not nervous/anxious.      Objective:     /70 (BP Location: Right arm, Patient Position: Sitting, BP Cuff Size: Adult)   Pulse (!) 101   Resp 16   Ht 1.778 m (5' 10\")   Wt (!) 130 kg (286 lb)   SpO2 93%   BMI 41.04 kg/m²   Body mass index is 41.04 kg/m².  Wt Readings from Last 4 Encounters:   06/28/22 (!) 130 kg (286 lb)   01/04/22 122 kg (268 lb 6.4 oz)   12/29/21 120 kg (265 lb)   07/27/21 (!) 138 kg (304 lb 3.2 oz)       Physical Exam:  Constitutional: Well-developed and well-nourished. Not diaphoretic. No distress.   Skin: Skin is warm and dry. No rash noted.  Head: Atraumatic without lesions.  Eyes: Conjunctivae and extraocular motions are normal. Pupils are equal, round, and reactive to light. No scleral icterus.   Nose: Nares patent. Septum midline. Turbinates without erythema nor edema. No discharge.   Neck: Supple, trachea midline. Normal range of motion. No thyromegaly present.   Cardiovascular: Regular rate and rhythm, S1 and S2 without murmur, rubs, or gallops.    Lungs: Effort normal. Clear to auscultation throughout. No adventitious sounds. No CVA tenderness.  Extremities: No cyanosis, clubbing, erythema, nor edema. Distal pulses intact and symmetric.   Musculoskeletal: All major joints AROM full in all directions without pain.  Neurological: Alert and oriented x 3. No cranial nerve deficit. 5/5 myotomes. Sensation intact.  Psychiatric:  Behavior, mood, and affect are " appropriate.  Foot: Noted some mild dry skin, thickened fungal toenail of right great toe and somewhat on left great toe, no skin breakdown, 4 sites tested for sites felt with monofilament bilaterally, pulses 2+, cap refill less than 2 seconds, feet warm and dry      Assessment and Plan:     1. Well adult exam   Patient declines pneumonia shot today otherwise up-to-date, order for colonoscopy, PSA not concerning on recent lab work, patient has gained some weight discussed importance of keeping diet under control.    2. Dyslipidemia   Plan to go up on to 40 mg nightly to see if we can get LDL to goal atorvastatin (LIPITOR) 40 MG Tab    Comp Metabolic Panel   3. Type 2 diabetes mellitus without complication, without long-term current use of insulin (HCC)   Blood sugars seem to be in a good range.  We will plan on rechecking in 6 months.    No neuropathy noted on monofilament exam today.  Patient seeing eye doctor tomorrow request that he have records sent over to us. Diabetic Monofilament LE Exam   4. Essential hypertension   This is a chronic condition, controlled.  Blood pressure is at goal under 140/90 in the office today.  We will continue the current regimen.  -Discussed with patient checking at home on occasion to make sure he is not trending up    5. Onychomycosis   Noted thickened toenail.  Patient's had oral terbinafine in the past to treat this.  Recent LFTs within normal limits.  We will start terbinafine and plan on rechecking liver enzymes in 6 weeks. terbinafine (LAMISIL) 250 MG Tab    Comp Metabolic Panel   6. Colon cancer screening  Referral to GI for Colonoscopy       HCM: Ordered colonoscopy  Labs per orders.  Vaccinations per orders.  Counseling about diet, supplements, exercise, skin care and safe sex.    Follow-up: Return in about 6 months (around 12/28/2022).

## 2022-06-28 NOTE — ASSESSMENT & PLAN NOTE
This is a chronic condition, controlled.  Blood pressure is at goal under 140/90 in the office today.  We will continue the current regimen.  -Discussed with patient checking at home on occasion to make sure he is not trending up

## 2022-07-04 NOTE — TELEPHONE ENCOUNTER
Please remind pt to get labs done. Will send 3 months to pharmacy.     The patient is a 55y year old Female complaining of finger pain/injury.

## 2022-07-15 ENCOUNTER — OFFICE VISIT (OUTPATIENT)
Dept: DERMATOLOGY | Facility: IMAGING CENTER | Age: 57
End: 2022-07-15
Payer: COMMERCIAL

## 2022-07-15 DIAGNOSIS — L82.1 SEBORRHEIC KERATOSIS: ICD-10-CM

## 2022-07-15 DIAGNOSIS — Z12.83 SKIN CANCER SCREENING: ICD-10-CM

## 2022-07-15 DIAGNOSIS — L91.8 SKIN TAG: ICD-10-CM

## 2022-07-15 DIAGNOSIS — L81.4 LENTIGO: ICD-10-CM

## 2022-07-15 DIAGNOSIS — L60.3 NAIL DYSTROPHY: ICD-10-CM

## 2022-07-15 DIAGNOSIS — D22.9 NEVUS: ICD-10-CM

## 2022-07-15 DIAGNOSIS — Z85.828 HISTORY OF BASAL CELL CARCINOMA: ICD-10-CM

## 2022-07-15 DIAGNOSIS — L90.8 SKIN AGING: ICD-10-CM

## 2022-07-15 DIAGNOSIS — D49.2 NEOPLASM OF SKIN: ICD-10-CM

## 2022-07-15 DIAGNOSIS — D18.01 CHERRY ANGIOMA: ICD-10-CM

## 2022-07-15 PROCEDURE — 99213 OFFICE O/P EST LOW 20 MIN: CPT | Mod: 25 | Performed by: DERMATOLOGY

## 2022-07-15 PROCEDURE — 11104 PUNCH BX SKIN SINGLE LESION: CPT | Performed by: DERMATOLOGY

## 2022-07-15 RX ORDER — CEPHALEXIN 500 MG/1
500 CAPSULE ORAL 3 TIMES DAILY
Qty: 21 CAPSULE | Refills: 0 | Status: SHIPPED | OUTPATIENT
Start: 2022-07-15 | End: 2022-12-19

## 2022-07-15 NOTE — PROGRESS NOTES
CC:  IVONNE     Subjective: Previously seen patient here for skin check     No new concerns today.  Denies sites I/B/C/B       History of skin cancer: BCC right preauricular 06/07/2021   History of precancers/actinic keratoses: No  History of biopsies:Yes, Details: benign moles age 9  History of blistering/severe sunburns:Yes, Details: Young adult  Family history of skin cancer:No  Family history of atypical moles:Yes, Details: Father, not sure if atypical      ROS: no fevers/chills. No itch.  No cough  Relevant PMH: morbid obesity, HTN  Social: NS    PE: Gen:WDWN male in NAD. Skin:Scalp/face/eyes/lips/neck/chest/back/arms/legs/hands/feet/buttocks - without suspicious lesions noted.  Genitals exam declined  -scar well healed on right lateral face  -6mm hyperpigmented macule, irregular pigment, on back.   -scattered hyperpigmented macules/papules, appearing benign on torso and extremities; some waxy in texture  -cherry red vascular macules/papules on torso/extremities  -many skin tags, axilla, pericollar  -nail dystrophy mult toenails    A/P: Neoplasm NOS:   -consent for bx, including R/B/A. Cleaned with EtOH, anesthesia with lidocaine 1% + epinephrine, 8mm punch bx, 4-0 Prolene to close  -vaseline/bandage and wound care reviewed  -s/r in 1-2 weeks  -backup rx for ABX due to size/site. Anticipatory guidance for signs/sx of wound infection    Hx of skin cancer:  -cont'd sunprotection and skin cancer surveillance  -Q 6mo-annual exam recommended; f/u suspicious lesions PRN    Nevi: benign appearing:  -Reviewed ABCDEs  -Reviewed skin cancer detection/prevention  -RTC PRN growth/changes/concerning features    Lentigos/SKs: benign  -reassurance  -reviewed skin cancer detection/prevention    Cherry angioma: benign    Nail dystrophy: undergoing trx lamisil oral currently    ST: no trx reviewed today    F/u 6-12 months/PRN    I have reviewed medications relevant to my specialty.

## 2022-08-10 ENCOUNTER — OFFICE VISIT (OUTPATIENT)
Dept: MEDICAL GROUP | Facility: MEDICAL CENTER | Age: 57
End: 2022-08-10
Payer: COMMERCIAL

## 2022-08-10 VITALS
OXYGEN SATURATION: 96 % | SYSTOLIC BLOOD PRESSURE: 138 MMHG | BODY MASS INDEX: 42.98 KG/M2 | HEART RATE: 74 BPM | DIASTOLIC BLOOD PRESSURE: 80 MMHG | HEIGHT: 70 IN | TEMPERATURE: 97.4 F | WEIGHT: 300.2 LBS

## 2022-08-10 DIAGNOSIS — M25.561 CHRONIC PAIN OF BOTH KNEES: ICD-10-CM

## 2022-08-10 DIAGNOSIS — G47.26 CIRCADIAN RHYTHM SLEEP DISORDER, SHIFT WORK TYPE: ICD-10-CM

## 2022-08-10 DIAGNOSIS — M25.562 CHRONIC PAIN OF BOTH KNEES: ICD-10-CM

## 2022-08-10 DIAGNOSIS — E66.01 MORBID OBESITY WITH BMI OF 40.0-44.9, ADULT (HCC): ICD-10-CM

## 2022-08-10 DIAGNOSIS — G89.29 CHRONIC PAIN OF BOTH KNEES: ICD-10-CM

## 2022-08-10 PROBLEM — U07.1 LAB TEST POSITIVE FOR DETECTION OF COVID-19 VIRUS: Status: RESOLVED | Noted: 2022-01-03 | Resolved: 2022-08-10

## 2022-08-10 PROCEDURE — 99214 OFFICE O/P EST MOD 30 MIN: CPT | Performed by: FAMILY MEDICINE

## 2022-08-10 RX ORDER — ZOLPIDEM TARTRATE 5 MG/1
5 TABLET ORAL NIGHTLY PRN
Qty: 30 TABLET | Refills: 0 | Status: SHIPPED | OUTPATIENT
Start: 2022-08-10 | End: 2022-10-26 | Stop reason: SDUPTHER

## 2022-08-10 RX ORDER — MELOXICAM 15 MG/1
15 TABLET ORAL DAILY
Qty: 30 TABLET | Refills: 1 | Status: SHIPPED | OUTPATIENT
Start: 2022-08-10 | End: 2022-12-07 | Stop reason: SDUPTHER

## 2022-08-10 ASSESSMENT — FIBROSIS 4 INDEX: FIB4 SCORE: 0.94

## 2022-08-10 NOTE — PROGRESS NOTES
"Subjective:     CC: \"Bilateral knee pain\"    HPI:   Braden presents today with:    B/L Knee Pain:  Right feels worse than Left  Aches at night, keeps him awake  He has had cortisone in his back and tolerated it well  Never had them in his knees  Had had orthoscopic surgery on both knees  Previous imaging showed significant arthritis as well    Obesity:  Patient had previously seen bariatric surgery and completed program but was lost to follow-up due to life events he would like to reestablish and see if this is an option for him    Insomnia:  Patient has difficulty transitioning from the weekend back to work because he has to wake up so early for his  job.  He sometimes wakes up at 1 or 2:00 in the morning so has to go to bed very early.  On the beginning of the week he would like to use Ambien like he has in the past to reset his sleep schedule.  Has not had difficulty with confusion, sleepwalking, falls, difficulty waking with the medication in the past.    Problem   Chronic Pain of Both Knees    5/5/2019: MRI left knee  IMPRESSION:  1. Horizontal tear of the body and posterior horn extending to the posterior meniscal root with associated root tear. Mild extrusion of the meniscal body.  2. Intact cruciate and collateral ligaments.  3. 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  4. Small knee joint effusion.    5/5/2019 MRI right knee  IMPRESSION:  1. Horizontal oblique tear of the body and posterior horn medial meniscus. Mild extrusion of the meniscal body.  2. Intact cruciate and collateral ligaments.  3. 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  4. Small knee joint effusion. Small Baker's cyst.  5. Soft tissue contusion anterior medial to the patella and patellar tendon.       Circadian Rhythm Sleep Disorder, Shift Work Type    Has to get up at 1am for work on weekdays     Morbid Obesity With Bmi of 40.0-44.9, Adult ([facility])    8/23/2022: 300 pounds, 136 kg, BMI " 43.07     Lab Test Positive for Detection of Covid-19 Virus (Resolved)    Positive for Covid December 29, he tells me that he did not really have symptoms when he got tested.  His wife was found to be positive for Covid on the 27th and reports that 7 out of 10 of her office mates are also positive around this time.  This is what prompted him to get tested.  He had since developed a cough and sore throat pain described as mild cold symptoms.  He denies ever having fevers, chills, body aches, nausea, vomiting, diarrhea, loss of smell, loss of taste.  He occasionally has a lingering cough but is feeling much better.  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         Current Outpatient Medications Ordered in Epic   Medication Sig Dispense Refill    zolpidem (AMBIEN) 5 MG Tab Take 1 Tablet by mouth at bedtime as needed for Sleep (use once a week for sleep schedule change) for up to 90 days. 30 Tablet 0    meloxicam (MOBIC) 15 MG tablet Take 1 Tablet by mouth every day. 30 Tablet 1    cephALEXin (KEFLEX) 500 MG Cap Take 1 Capsule by mouth 3 times a day. Take for signs/symptoms of wound infection 21 Capsule 0    atorvastatin (LIPITOR) 40 MG Tab Take 1 Tablet by mouth every evening. 90 Tablet 1    terbinafine (LAMISIL) 250 MG Tab Take 1 Tablet by mouth every day. 30 Tablet 3    metFORMIN ER (GLUCOPHAGE XR) 500 MG TABLET SR 24 HR Take 1 Tablet by mouth 2 times a day. 180 Tablet 0    hydrOXYzine HCl (ATARAX) 25 MG Tab TAKE ONE TABLET BY MOUTH THREE TIMES A DAY AS NEEDED FOR ITCHING / ANXIETY 90 Tablet 5    Semaglutide,0.25 or 0.5MG/DOS, 2 MG/1.5ML Solution Pen-injector Inject 0.5 mg under the skin every 7 days. 1.5 mL 11    aspirin EC (ECOTRIN) 81 MG Tablet Delayed Response Take 1 tablet by mouth every day. 30 tablet 11    losartan (COZAAR) 50 MG Tab Take 1 tablet by mouth every day. 90 tablet 3    amLODIPine (NORVASC) 10 MG Tab Take 1 tablet by mouth every day. 90 tablet 3     "cyanocobalamin (VITAMIN B12) 1000 MCG Tab Take 1 Tab by mouth every day. 30 Tab 11    Psyllium (METAMUCIL FIBER PO) Take  by mouth every day.       No current [facility]-ordered facility-administered medications on file.       Health Maintenance: Reminded patient regarding scheduling colonoscopy    ROS:  ROS see HPI    Objective:     Exam:  /80   Pulse 74   Temp 36.3 °C (97.4 °F) (Temporal)   Ht 1.778 m (5' 10\")   Wt (!) 136 kg (300 lb 3.2 oz)   SpO2 96%   BMI 43.07 kg/m²  Body mass index is 43.07 kg/m².    Physical Exam  Vitals reviewed.   Constitutional:       General: He is not in acute distress.     Appearance: Normal appearance. He is obese.   HENT:      Head: Normocephalic and atraumatic.   Cardiovascular:      Rate and Rhythm: Normal rate and regular rhythm.      Heart sounds: Normal heart sounds.   Pulmonary:      Effort: Pulmonary effort is normal.      Breath sounds: Normal breath sounds.   Musculoskeletal:         General: Tenderness (mild tenderness) present. No swelling, deformity or signs of injury.   Skin:     General: Skin is warm and dry.   Neurological:      Mental Status: He is alert. Mental status is at baseline.   Psychiatric:         Mood and Affect: Mood normal.         Behavior: Behavior normal.         Assessment & Plan:     57 y.o. male with the following -     Problem List Items Addressed This Visit       Circadian rhythm sleep disorder, shift work type     We will restart Ambien 5 mg to use nightly as needed for when he is restarting his week.  Patient understands he is a sparingly and that he does have side effects and can be habit-forming.  PDMP reviewed no concerns.         Relevant Medications    zolpidem (AMBIEN) 5 MG Tab    Morbid obesity with BMI of 40.0-44.9, adult ([facility])     Referral for patient to be established with bariatric program.         Relevant Orders    Referral to General Surgery    Patient identified as having weight management issue.  Appropriate orders and " counseling given.    Chronic pain of both knees     Patient with continued bilateral knee pain.  He is hoping to push off surgery for 1 more year and was interested in cortisone shots.  He has had shots in his back and tolerated well but not in his knees.  I discussed with patient that to be more comfortable after 4 May that determination as cortisone can affect his ability to get surgery anytime soon and if he does  I would not want to be the 1 to.  I will also defer to Ortho on new imaging if needed at this time.  Patient understands that surgery will be the most corrective outcome for the level of knee derangement he has.  I certainly can do shots with Kenalog here in the clinic in the future if determined to be appropriate.         Relevant Medications    meloxicam (MOBIC) 15 MG tablet    Other Relevant Orders    Referral to Orthopedics           Return if symptoms worsen or fail to improve.    Please note that this dictation was created using voice recognition software. I have made every reasonable attempt to correct obvious errors, but I expect that there are errors of grammar and possibly content that I did not discover before finalizing the note.

## 2022-08-10 NOTE — ASSESSMENT & PLAN NOTE
We will restart Ambien 5 mg to use nightly as needed for when he is restarting his week.  Patient understands he is a sparingly and that he does have side effects and can be habit-forming.  PDMP reviewed no concerns.

## 2022-08-10 NOTE — ASSESSMENT & PLAN NOTE
5/5/2019: MRI left knee  IMPRESSION:  1. Horizontal tear of the body and posterior horn extending to the posterior meniscal root with associated root tear. Mild extrusion of the meniscal body.  2. Intact cruciate and collateral ligaments.  3. 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  4. Small knee joint effusion.    5/5/2019 MRI right knee  IMPRESSION:  1. Horizontal oblique tear of the body and posterior horn medial meniscus. Mild extrusion of the meniscal body.  2. Intact cruciate and collateral ligaments.  3. 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  4. Small knee joint effusion. Small Baker's cyst.  5. Soft tissue contusion anterior medial to the patella and patellar tendon.

## 2022-08-10 NOTE — ASSESSMENT & PLAN NOTE
Patient with continued bilateral knee pain.  He is hoping to push off surgery for 1 more year and was interested in cortisone shots.  He has had shots in his back and tolerated well but not in his knees.  I discussed with patient that to be more comfortable after 4 May that determination as cortisone can affect his ability to get surgery anytime soon and if he does  I would not want to be the 1 to.  I will also defer to Ortho on new imaging if needed at this time.  Patient understands that surgery will be the most corrective outcome for the level of knee derangement he has.  I certainly can do shots with Kenalog here in the clinic in the future if determined to be appropriate.

## 2022-08-10 NOTE — LETTER
[facility]  Greg Reaves D.O.  [address] [facility] [address]  [place] [address]  Fax: [phone]   Authorization for Release/Disclosure of   Protected Health Information   Name: BRADEN VIDAL : 1965 SSN: [id number]   Address: [address] [place] [address] [address] [place] [address] Phone:    [phone] (home)    I authorize the entity listed below to release/disclose the PHI below to:   [facility] [employer]/Greg Reaves D.O. and Greg Reaves D.O.   Provider or Entity Name:     Address   City, State, [facility]   Phone:      Fax:     Reason for request: continuity of care   Information to be released:    [  ] LAST COLONOSCOPY,  including any PATH REPORT and follow-up  [  ] LAST FIT/COLOGUARD RESULT [  ] LAST DEXA  [  ] LAST MAMMOGRAM  [  ] LAST PAP  [  ] LAST LABS [xxx  ] RETINA EXAM REPORT  [  ] IMMUNIZATION RECORDS  [  ] Release all info      [  ] Check here and initial the line next to each item to release ALL health information INCLUDING  _____ Care and treatment for drug and / or alcohol abuse  _____ HIV testing, infection status, or AIDS  _____ Genetic Testing    DATES OF SERVICE OR TIME PERIOD TO BE DISCLOSED: _____________  I understand and acknowledge that:  * This Authorization may be revoked at any time by you in writing, except if your health information has already been used or disclosed.  * Your health information that will be used or disclosed as a result of you signing this authorization could be re-disclosed by the recipient. If this occurs, your re-disclosed health information may no longer be protected by State or Federal laws.  * You may refuse to sign this Authorization. Your refusal will not affect your ability to obtain treatment.  * This Authorization becomes effective upon signing and will  on (date) __________.      If no date is indicated, this Authorization will  one (1) year from the signature date.    Name: Braden Vidal    Signature:   Date:     8/10/2022        PLEASE FAX REQUESTED RECORDS BACK TO: [phone]

## 2022-08-21 DIAGNOSIS — I10 ESSENTIAL HYPERTENSION: ICD-10-CM

## 2022-08-22 RX ORDER — LOSARTAN POTASSIUM 50 MG/1
50 TABLET ORAL DAILY
Qty: 90 TABLET | Refills: 3 | Status: SHIPPED | OUTPATIENT
Start: 2022-08-22 | End: 2022-12-07 | Stop reason: SDUPTHER

## 2022-08-25 ENCOUNTER — TELEPHONE (OUTPATIENT)
Dept: MEDICAL GROUP | Facility: MEDICAL CENTER | Age: 57
End: 2022-08-25
Payer: COMMERCIAL

## 2022-08-25 NOTE — TELEPHONE ENCOUNTER
DOCUMENTATION OF PAR STATUS:    1. Name of Medication & Dose: Terbinafine HCl 250MG tablets    2. Name of Prescription Coverage Company & phone #: [employer]    3. Date Prior Auth Submitted: 8/25/2022    4. What information was given to obtain insurance decision? CMM    5. Prior Auth Status? Pending    6. Patient Notified: N\A

## 2022-09-18 DIAGNOSIS — E11.9 TYPE 2 DIABETES MELLITUS WITHOUT COMPLICATION, WITHOUT LONG-TERM CURRENT USE OF INSULIN (HCC): ICD-10-CM

## 2022-10-04 DIAGNOSIS — G47.26 CIRCADIAN RHYTHM SLEEP DISORDER, SHIFT WORK TYPE: ICD-10-CM

## 2022-10-04 RX ORDER — ZOLPIDEM TARTRATE 5 MG/1
5 TABLET ORAL NIGHTLY PRN
Qty: 30 TABLET | Refills: 0 | Status: CANCELLED | OUTPATIENT
Start: 2022-10-04 | End: 2023-01-02

## 2022-10-10 ENCOUNTER — APPOINTMENT (OUTPATIENT)
Dept: MEDICAL GROUP | Facility: MEDICAL CENTER | Age: 57
End: 2022-10-10
Payer: COMMERCIAL

## 2022-10-10 DIAGNOSIS — E11.9 TYPE 2 DIABETES MELLITUS WITHOUT COMPLICATION, WITHOUT LONG-TERM CURRENT USE OF INSULIN (HCC): ICD-10-CM

## 2022-10-10 RX ORDER — METFORMIN HYDROCHLORIDE 500 MG/1
500 TABLET, EXTENDED RELEASE ORAL 2 TIMES DAILY
Qty: 180 TABLET | Refills: 0 | Status: SHIPPED | OUTPATIENT
Start: 2022-10-10 | End: 2022-12-07 | Stop reason: SDUPTHER

## 2022-10-18 DIAGNOSIS — B35.1 ONYCHOMYCOSIS: ICD-10-CM

## 2022-10-18 RX ORDER — TERBINAFINE HYDROCHLORIDE 250 MG/1
250 TABLET ORAL DAILY
Qty: 30 TABLET | Refills: 3 | OUTPATIENT
Start: 2022-10-18

## 2022-10-20 DIAGNOSIS — B35.1 ONYCHOMYCOSIS: ICD-10-CM

## 2022-10-20 RX ORDER — TERBINAFINE HYDROCHLORIDE 250 MG/1
250 TABLET ORAL DAILY
Qty: 30 TABLET | Refills: 3 | OUTPATIENT
Start: 2022-10-20

## 2022-10-24 RX ORDER — SEMAGLUTIDE 1.34 MG/ML
INJECTION, SOLUTION SUBCUTANEOUS
Qty: 1.5 ML | Refills: 0 | Status: SHIPPED | OUTPATIENT
Start: 2022-10-24 | End: 2022-11-28 | Stop reason: SDUPTHER

## 2022-10-26 DIAGNOSIS — G47.26 CIRCADIAN RHYTHM SLEEP DISORDER, SHIFT WORK TYPE: ICD-10-CM

## 2022-10-26 RX ORDER — ZOLPIDEM TARTRATE 5 MG/1
5 TABLET ORAL NIGHTLY PRN
Qty: 30 TABLET | Refills: 0 | Status: SHIPPED | OUTPATIENT
Start: 2022-10-26 | End: 2022-12-19

## 2022-11-28 RX ORDER — SEMAGLUTIDE 1.34 MG/ML
0.5 INJECTION, SOLUTION SUBCUTANEOUS
Qty: 1.5 ML | Refills: 0 | Status: SHIPPED | OUTPATIENT
Start: 2022-11-28 | End: 2022-12-02 | Stop reason: SDUPTHER

## 2022-11-29 DIAGNOSIS — E78.5 DYSLIPIDEMIA: ICD-10-CM

## 2022-12-01 ENCOUNTER — PATIENT MESSAGE (OUTPATIENT)
Dept: MEDICAL GROUP | Facility: MEDICAL CENTER | Age: 57
End: 2022-12-01
Payer: COMMERCIAL

## 2022-12-02 RX ORDER — SEMAGLUTIDE 1.34 MG/ML
0.5 INJECTION, SOLUTION SUBCUTANEOUS
Qty: 1.5 ML | Refills: 0 | Status: SHIPPED | OUTPATIENT
Start: 2022-12-02 | End: 2022-12-28 | Stop reason: SDUPTHER

## 2022-12-07 DIAGNOSIS — I10 ESSENTIAL HYPERTENSION: ICD-10-CM

## 2022-12-07 DIAGNOSIS — E11.9 TYPE 2 DIABETES MELLITUS WITHOUT COMPLICATION, WITHOUT LONG-TERM CURRENT USE OF INSULIN (HCC): ICD-10-CM

## 2022-12-07 DIAGNOSIS — E78.5 DYSLIPIDEMIA: ICD-10-CM

## 2022-12-07 RX ORDER — METFORMIN HYDROCHLORIDE 500 MG/1
500 TABLET, EXTENDED RELEASE ORAL 2 TIMES DAILY
Qty: 180 TABLET | Refills: 0 | Status: SHIPPED | OUTPATIENT
Start: 2022-12-07 | End: 2022-12-19 | Stop reason: SDUPTHER

## 2022-12-07 RX ORDER — MELOXICAM 15 MG/1
15 TABLET ORAL DAILY
Qty: 30 TABLET | Refills: 1 | Status: SHIPPED | OUTPATIENT
Start: 2022-12-07 | End: 2022-12-19 | Stop reason: SDUPTHER

## 2022-12-07 RX ORDER — LOSARTAN POTASSIUM 50 MG/1
50 TABLET ORAL DAILY
Qty: 90 TABLET | Refills: 3 | Status: SHIPPED | OUTPATIENT
Start: 2022-12-07 | End: 2022-12-19 | Stop reason: SDUPTHER

## 2022-12-07 RX ORDER — ATORVASTATIN CALCIUM 40 MG/1
40 TABLET, FILM COATED ORAL NIGHTLY
Qty: 90 TABLET | Refills: 1 | Status: SHIPPED | OUTPATIENT
Start: 2022-12-07 | End: 2022-12-19 | Stop reason: SDUPTHER

## 2022-12-19 ENCOUNTER — OFFICE VISIT (OUTPATIENT)
Dept: MEDICAL GROUP | Facility: MEDICAL CENTER | Age: 57
End: 2022-12-19
Payer: COMMERCIAL

## 2022-12-19 VITALS
TEMPERATURE: 96.7 F | SYSTOLIC BLOOD PRESSURE: 148 MMHG | DIASTOLIC BLOOD PRESSURE: 86 MMHG | OXYGEN SATURATION: 96 % | BODY MASS INDEX: 42.75 KG/M2 | WEIGHT: 298.6 LBS | HEIGHT: 70 IN | HEART RATE: 93 BPM

## 2022-12-19 DIAGNOSIS — B35.1 ONYCHOMYCOSIS: ICD-10-CM

## 2022-12-19 DIAGNOSIS — E11.9 TYPE 2 DIABETES MELLITUS WITHOUT COMPLICATION, WITHOUT LONG-TERM CURRENT USE OF INSULIN (HCC): ICD-10-CM

## 2022-12-19 DIAGNOSIS — E78.5 DYSLIPIDEMIA: ICD-10-CM

## 2022-12-19 DIAGNOSIS — G47.26 CIRCADIAN RHYTHM SLEEP DISORDER, SHIFT WORK TYPE: ICD-10-CM

## 2022-12-19 DIAGNOSIS — I10 ESSENTIAL HYPERTENSION: ICD-10-CM

## 2022-12-19 DIAGNOSIS — M25.561 CHRONIC PAIN OF BOTH KNEES: ICD-10-CM

## 2022-12-19 DIAGNOSIS — M25.562 CHRONIC PAIN OF BOTH KNEES: ICD-10-CM

## 2022-12-19 DIAGNOSIS — G89.29 CHRONIC PAIN OF BOTH KNEES: ICD-10-CM

## 2022-12-19 DIAGNOSIS — Z23 NEED FOR VACCINATION: ICD-10-CM

## 2022-12-19 DIAGNOSIS — E66.01 MORBID OBESITY WITH BMI OF 40.0-44.9, ADULT (HCC): ICD-10-CM

## 2022-12-19 PROCEDURE — 99214 OFFICE O/P EST MOD 30 MIN: CPT | Mod: 25 | Performed by: FAMILY MEDICINE

## 2022-12-19 PROCEDURE — 90471 IMMUNIZATION ADMIN: CPT | Performed by: FAMILY MEDICINE

## 2022-12-19 PROCEDURE — 90686 IIV4 VACC NO PRSV 0.5 ML IM: CPT | Performed by: FAMILY MEDICINE

## 2022-12-19 RX ORDER — CLOBETASOL PROPIONATE 0.5 MG/G
1 OINTMENT TOPICAL DAILY
Qty: 15 G | Refills: 1 | Status: SHIPPED | OUTPATIENT
Start: 2022-12-19

## 2022-12-19 RX ORDER — METFORMIN HYDROCHLORIDE 500 MG/1
500 TABLET, EXTENDED RELEASE ORAL 2 TIMES DAILY
Qty: 180 TABLET | Refills: 3 | Status: SHIPPED | OUTPATIENT
Start: 2022-12-19 | End: 2023-10-16

## 2022-12-19 RX ORDER — MELOXICAM 15 MG/1
15 TABLET ORAL DAILY
Qty: 30 TABLET | Refills: 1 | Status: SHIPPED | OUTPATIENT
Start: 2022-12-19 | End: 2023-02-13

## 2022-12-19 RX ORDER — AMLODIPINE BESYLATE 10 MG/1
10 TABLET ORAL NIGHTLY
Qty: 90 TABLET | Refills: 3 | Status: SHIPPED | OUTPATIENT
Start: 2022-12-19 | End: 2023-10-16

## 2022-12-19 RX ORDER — LOSARTAN POTASSIUM 100 MG/1
100 TABLET ORAL NIGHTLY
Qty: 100 TABLET | Refills: 3 | Status: SHIPPED | OUTPATIENT
Start: 2022-12-19 | End: 2023-10-16

## 2022-12-19 RX ORDER — ZOLPIDEM TARTRATE 10 MG/1
10 TABLET ORAL NIGHTLY PRN
Qty: 30 TABLET | Refills: 0 | Status: SHIPPED | OUTPATIENT
Start: 2022-12-19 | End: 2023-01-20 | Stop reason: SDUPTHER

## 2022-12-19 RX ORDER — ATORVASTATIN CALCIUM 40 MG/1
40 TABLET, FILM COATED ORAL NIGHTLY
Qty: 90 TABLET | Refills: 3 | Status: SHIPPED | OUTPATIENT
Start: 2022-12-19 | End: 2023-10-16

## 2022-12-19 ASSESSMENT — FIBROSIS 4 INDEX: FIB4 SCORE: 0.94

## 2022-12-19 NOTE — ASSESSMENT & PLAN NOTE
Continue Statin therapy with atorvastatin and plan to check lipid panel when due in about 6 months

## 2022-12-19 NOTE — ASSESSMENT & PLAN NOTE
Patient reports toes responded well to PO terbinafine, has some remnants on great toenail. Plan to use topical terbinafine and then trim as able.

## 2022-12-19 NOTE — LETTER
[employer]  Greg Reaves D.O.  [address] [facility] [address]  [place] [address]  Fax: [phone]   Authorization for Release/Disclosure of   Protected Health Information   Name: BRADEN VIDAL : 1965 SSN: [id number]   Address: [address] [place] [address] [address] [place] [address] Phone:    [phone] (home)    I authorize the entity listed below to release/disclose the PHI below to:   [facility]/Greg Reaves D.O. and Greg Reaves D.O.   Provider or Entity Name:  [facility]   Address   City, State, Zip   Phone:      Fax:     Reason for request: continuity of care   Information to be released:    [  ] LAST COLONOSCOPY,  including any PATH REPORT and follow-up  [  ] LAST FIT/COLOGUARD RESULT [  ] LAST DEXA  [  ] LAST MAMMOGRAM  [  ] LAST PAP  [  ] LAST LABS [xxx  ] RETINA EXAM REPORT  [  ] IMMUNIZATION RECORDS  [  ] Release all info      [  ] Check here and initial the line next to each item to release ALL health information INCLUDING  _____ Care and treatment for drug and / or alcohol abuse  _____ HIV testing, infection status, or AIDS  _____ Genetic Testing    DATES OF SERVICE OR TIME PERIOD TO BE DISCLOSED: _____________  I understand and acknowledge that:  * This Authorization may be revoked at any time by you in writing, except if your health information has already been used or disclosed.  * Your health information that will be used or disclosed as a result of you signing this authorization could be re-disclosed by the recipient. If this occurs, your re-disclosed health information may no longer be protected by State or Federal laws.  * You may refuse to sign this Authorization. Your refusal will not affect your ability to obtain treatment.  * This Authorization becomes effective upon signing and will  on (date) __________.      If no date is indicated, this Authorization will  one (1) year from the signature date.    Name: Braden Vidal    Signature:   Date:      12/19/2022       PLEASE FAX REQUESTED RECORDS BACK TO: [phone]

## 2022-12-19 NOTE — ASSESSMENT & PLAN NOTE
Weight today is 298 with a BMI of 42.8  This is relatively stable  Will get lab work completed and will probably look to increase semaglutide to 1 mg

## 2022-12-19 NOTE — ASSESSMENT & PLAN NOTE
Patient to continue with Physiatry  Discussed not using over the counter NSAIDs the same day he wants to use meloxicam

## 2022-12-19 NOTE — PROGRESS NOTES
"Subjective:     CC: \"medication management and diabetes management\"    HPI:   Braden presents today with:    Rash:  Chronic shows up on nasal folds and forehead  Uses clobetasol 0.05% daily was prescribed by previous provider does not make rash worse and seems to help  Gets sluff build up if he doesn't use ointment  Worse in the winter  Not really itchy    Diabetes:  No home log  Has been taking Metformin 500 mg just once a day and has been on Ozempic 0.5 mg for quite some time  Patient is taking aspirin, statin and ARB  No concerning complaints    Onychomycosis  Patient has completed Terbinafine and has just a little fungus left on his big toe nail  He reports that it seems to be growing out clean    BP:  No home lot  Patient takes amlodipine 10 mg and Losartan 50 mg    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 Horizontal tear of the body and posterior horn extending to the posterior meniscal root with associated root tear. Mild extrusion of the meniscal body.  2. Intact cruciate and collateral ligaments.  3. 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  4. Small knee joint effusion.    5/5/2019 MRI right knee  IMPRESSION:  1. Horizontal oblique tear of the body and posterior horn medial meniscus. Mild extrusion of the meniscal body.  2. Intact cruciate and collateral ligaments.  3. 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  4. Small knee joint effusion. Small Baker's cyst.  5. Soft tissue contusion anterior medial to the patella and patellar tendon.       Type 2 Diabetes Mellitus Without Complication, Without Long-Term Current Use " "of Insulin ([facility])    5/12/2022: A1c 5.8  Metformin 500 mg daily, semaglutide 0.5 mg q. weekly, losartan, atorvastatin, aspirin     Circadian Rhythm Sleep Disorder, Shift Work Type    Has to get up at 1am for work on weekdays     Morbid Obesity With Bmi of 40.0-44.9, Adult ([facility])   Essential Hypertension       Current Outpatient Medications Ordered in Epic   Medication Sig Dispense Refill    clobetasol (TEMOVATE) 0.05 % Ointment Apply 1 Application topically every day. 15 g 1    losartan (COZAAR) 100 MG Tab Take 1 Tablet by mouth every evening. 100 Tablet 3    atorvastatin (LIPITOR) 40 MG Tab Take 1 Tablet by mouth every evening. 90 Tablet 3    amLODIPine (NORVASC) 10 MG Tab Take 1 Tablet by mouth every evening. 90 Tablet 3    metFORMIN ER (GLUCOPHAGE XR) 500 MG TABLET SR 24 HR Take 1 Tablet by mouth 2 times a day. 180 Tablet 3    meloxicam (MOBIC) 15 MG tablet Take 1 Tablet by mouth every day. 30 Tablet 1    zolpidem (AMBIEN) 10 MG Tab Take 1 Tablet by mouth at bedtime as needed for Sleep (use once a week for sleep schedule change) for up to 90 days. 30 Tablet 0    Semaglutide,0.25 or 0.5MG/DOS, (OZEMPIC, 0.25 OR 0.5 MG/DOSE,) 2 MG/1.5ML Solution Pen-injector Inject 0.5 mg under the skin every 7 days. 1.5 mL 0    hydrOXYzine HCl (ATARAX) 25 MG Tab TAKE ONE TABLET BY MOUTH THREE TIMES A DAY AS NEEDED FOR ITCHING / ANXIETY 90 Tablet 5    aspirin EC (ECOTRIN) 81 MG Tablet Delayed Response Take 1 tablet by mouth every day. 30 tablet 11    cyanocobalamin (VITAMIN B12) 1000 MCG Tab Take 1 Tab by mouth every day. 30 Tab 11    Psyllium (METAMUCIL FIBER PO) Take  by mouth every day.       No current [facility]-ordered facility-administered medications on file.       Health Maintenance: Records request for retina scan, Labs and colonoscopy pending, Influenza vaccine today    ROS:  ROS see HPI    Objective:     Exam:  BP (!) 148/86   Pulse 93   Temp 35.9 °C (96.7 °F) (Temporal)   Ht 1.778 m (5' 10\")   Wt (!) 135 kg (298 lb 9.6 oz) "   SpO2 96%   BMI 42.84 kg/m²  Body mass index is 42.84 kg/m².    Physical Exam  Vitals reviewed.   Constitutional:       General: He is not in acute distress.     Appearance: Normal appearance. He is obese.   HENT:      Head: Normocephalic and atraumatic.   Cardiovascular:      Rate and Rhythm: Normal rate and regular rhythm.      Heart sounds: Normal heart sounds.   Pulmonary:      Effort: Pulmonary effort is normal.      Breath sounds: Normal breath sounds.   Skin:     General: Skin is warm and dry.   Neurological:      Mental Status: He is alert. Mental status is at baseline.   Psychiatric:         Mood and Affect: Mood normal.         Behavior: Behavior normal.         Assessment & Plan:     57 y.o. male with the following -     Problem List Items Addressed This Visit       Essential hypertension     BP remains elevated  Will increase Losartan to 100 mg and continue with amlodipine at 10 mg         Relevant Medications    losartan (COZAAR) 100 MG Tab    atorvastatin (LIPITOR) 40 MG Tab    amLODIPine (NORVASC) 10 MG Tab    Circadian rhythm sleep disorder, shift work type     Will continue with PRN Ambien, patient understands can build dependence and to use sparingly         Relevant Medications    zolpidem (AMBIEN) 10 MG Tab    Morbid obesity with BMI of 40.0-44.9, adult ([facility])     Weight today is 298 with a BMI of 42.8  This is relatively stable  Will get lab work completed and will probably look to increase semaglutide to 1 mg         Relevant Medications    metFORMIN ER (GLUCOPHAGE XR) 500 MG TABLET SR 24 HR    Type 2 diabetes mellitus without complication, without long-term current use of insulin ([facility])     Patient reports plan to get to lab this week  Will assess A1C but will most likely continue on current regimen other than going up on semaglutide for weight loss support  Continue Metformin, losartan, atorvastatin, aspirin         Relevant Medications    metFORMIN ER (GLUCOPHAGE XR) 500 MG TABLET SR 24 HR     Chronic pain of both knees     Patient to continue with Physiatry  Discussed not using over the counter NSAIDs the same day he wants to use meloxicam         Relevant Medications    meloxicam (MOBIC) 15 MG tablet    Dyslipidemia     Continue Statin therapy with atorvastatin and plan to check lipid panel when due in about 6 months         Relevant Medications    atorvastatin (LIPITOR) 40 MG Tab    Onychomycosis     Patient reports toes responded well to PO terbinafine, has some remnants on great toenail. Plan to use topical terbinafine and then trim as able.          Other Visit Diagnoses       Need for vaccination                  Return in about 3 months (around 3/19/2023).    Please note that this dictation was created using voice recognition software. I have made every reasonable attempt to correct obvious errors, but I expect that there are errors of grammar and possibly content that I did not discover before finalizing the note.

## 2022-12-19 NOTE — ASSESSMENT & PLAN NOTE
Patient reports plan to get to lab this week  Will assess A1C but will most likely continue on current regimen other than going up on semaglutide for weight loss support  Continue Metformin, losartan, atorvastatin, aspirin

## 2022-12-30 DIAGNOSIS — L30.9 DERMATITIS: ICD-10-CM

## 2022-12-31 RX ORDER — HYDROXYZINE HYDROCHLORIDE 25 MG/1
TABLET, FILM COATED ORAL
Qty: 90 TABLET | Refills: 5 | Status: SHIPPED | OUTPATIENT
Start: 2022-12-31

## 2023-01-20 ENCOUNTER — OFFICE VISIT (OUTPATIENT)
Dept: MEDICAL GROUP | Facility: MEDICAL CENTER | Age: 58
End: 2023-01-20
Payer: COMMERCIAL

## 2023-01-20 VITALS
HEIGHT: 70 IN | TEMPERATURE: 96.9 F | WEIGHT: 294 LBS | BODY MASS INDEX: 42.09 KG/M2 | HEART RATE: 89 BPM | OXYGEN SATURATION: 95 % | SYSTOLIC BLOOD PRESSURE: 136 MMHG | DIASTOLIC BLOOD PRESSURE: 70 MMHG

## 2023-01-20 DIAGNOSIS — E11.9 TYPE 2 DIABETES MELLITUS WITHOUT COMPLICATION, WITHOUT LONG-TERM CURRENT USE OF INSULIN (HCC): ICD-10-CM

## 2023-01-20 DIAGNOSIS — G47.26 CIRCADIAN RHYTHM SLEEP DISORDER, SHIFT WORK TYPE: ICD-10-CM

## 2023-01-20 DIAGNOSIS — E66.01 MORBID OBESITY WITH BMI OF 40.0-44.9, ADULT (HCC): ICD-10-CM

## 2023-01-20 DIAGNOSIS — I10 ESSENTIAL HYPERTENSION: ICD-10-CM

## 2023-01-20 LAB
HBA1C MFR BLD: 6.2 % (ref 0–5.6)
INT CON NEG: ABNORMAL
INT CON POS: ABNORMAL

## 2023-01-20 PROCEDURE — 83036 HEMOGLOBIN GLYCOSYLATED A1C: CPT | Performed by: FAMILY MEDICINE

## 2023-01-20 PROCEDURE — 99214 OFFICE O/P EST MOD 30 MIN: CPT | Performed by: FAMILY MEDICINE

## 2023-01-20 RX ORDER — GLUCOSAMINE HCL/CHONDROITIN SU 500-400 MG
CAPSULE ORAL
Qty: 100 EACH | Refills: 0 | Status: SHIPPED | OUTPATIENT
Start: 2023-01-20 | End: 2024-01-30

## 2023-01-20 RX ORDER — LANCETS 30 GAUGE
EACH MISCELLANEOUS
Qty: 100 EACH | Refills: 0 | Status: SHIPPED | OUTPATIENT
Start: 2023-01-20 | End: 2024-01-30

## 2023-01-20 RX ORDER — ZOLPIDEM TARTRATE 10 MG/1
10 TABLET ORAL NIGHTLY PRN
Qty: 30 TABLET | Refills: 0 | Status: SHIPPED | OUTPATIENT
Start: 2023-01-20 | End: 2023-03-06 | Stop reason: SDUPTHER

## 2023-01-20 ASSESSMENT — ENCOUNTER SYMPTOMS
FALLS: 0
POLYDIPSIA: 0
NAUSEA: 0
WEAKNESS: 0
VOMITING: 0
WEIGHT LOSS: 0
PALPITATIONS: 0
DIARRHEA: 0
SHORTNESS OF BREATH: 0

## 2023-01-20 ASSESSMENT — FIBROSIS 4 INDEX: FIB4 SCORE: 0.94

## 2023-01-20 ASSESSMENT — PATIENT HEALTH QUESTIONNAIRE - PHQ9: CLINICAL INTERPRETATION OF PHQ2 SCORE: 0

## 2023-01-20 NOTE — PROGRESS NOTES
"Subjective:     CC: \"diabetes management\"    HPI:   Braden presents today with:    Had his DOT physical recently and was noted his pulse was 101. He also had glucosuria. He was instructed to follow up with PCP to check pulse again and his A1C.    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  Exercise is difficult due to schedule and arthritis  Carbs is an issues  Does pack food for himself  This winter has been even more difficult as he drive [id number] to [place] and back    Problem   Type 2 Diabetes Mellitus Without Complication, Without Long-Term Current Use of Insulin ([facility])   Morbid Obesity With Bmi of 40.0-44.9, Adult ([facility])   Essential Hypertension       Current Outpatient Medications Ordered in Epic   Medication Sig Dispense Refill    zolpidem (AMBIEN) 10 MG Tab Take 1 Tablet by mouth at bedtime as needed for Sleep (use once a week for sleep schedule change) for up to 90 days. 30 Tablet 0    Semaglutide, 1 MG/DOSE, 4 MG/3ML Solution Pen-injector Inject 1 mg under the skin every 7 days. 3 mL 0    Blood Glucose Meter Kit Test blood sugar as recommended by provider. [employer] Freedom Lite blood glucose monitoring kit. 1 Kit 0    Blood Glucose Test Strips Use one Abbott Freedom Lite strip to test blood sugar once daily early morning before first meal. 100 Strip 0    Lancets Use one Abbott [place] Lite lancet to test blood sugar once daily early morning before first meal. 100 Each 0    Alcohol Swabs Wipe site with prep pad prior to injection. 100 Each 0    hydrOXYzine HCl (ATARAX) 25 MG Tab TAKE ONE TABLET BY MOUTH THREE TIMES A DAY AS NEEDED FOR ITCHING / ANXIETY 90 Tablet 5    clobetasol (TEMOVATE) 0.05 % Ointment Apply 1 Application topically every day. 15 g 1    losartan (COZAAR) 100 MG Tab Take 1 Tablet by mouth every evening. 100 Tablet 3    atorvastatin (LIPITOR) 40 MG Tab Take 1 Tablet by mouth every evening. 90 Tablet 3    amLODIPine (NORVASC) 10 MG Tab Take 1 Tablet by mouth every evening. 90 Tablet 3    " "metFORMIN ER (GLUCOPHAGE XR) 500 MG TABLET SR 24 HR Take 1 Tablet by mouth 2 times a day. 180 Tablet 3    meloxicam (MOBIC) 15 MG tablet Take 1 Tablet by mouth every day. 30 Tablet 1    aspirin EC (ECOTRIN) 81 MG Tablet Delayed Response Take 1 tablet by mouth every day. 30 tablet 11    cyanocobalamin (VITAMIN B12) 1000 MCG Tab Take 1 Tab by mouth every day. 30 Tab 11    Psyllium (METAMUCIL FIBER PO) Take  by mouth every day.       No current [facility]-ordered facility-administered medications on file.       Health Maintenance: unable to do retina exam today because of technical issues    ROS:  Review of Systems   Constitutional:  Negative for weight loss.   Respiratory:  Negative for shortness of breath.    Cardiovascular:  Negative for chest pain and palpitations.   Gastrointestinal:  Negative for diarrhea, nausea and vomiting.   Genitourinary:  Negative for frequency.   Musculoskeletal:  Negative for falls.   Neurological:  Negative for weakness.   Endo/Heme/Allergies:  Negative for polydipsia.     Objective:     Exam:  /70 (BP Location: Right arm, Patient Position: Sitting, BP Cuff Size: Adult)   Pulse 89   Temp 36.1 °C (96.9 °F) (Temporal)   Ht 1.778 m (5' 10\")   Wt (!) 133 kg (294 lb)   SpO2 95%   BMI 42.18 kg/m²  Body mass index is 42.18 kg/m².    Physical Exam  Vitals reviewed.   Constitutional:       General: He is not in acute distress.     Appearance: Normal appearance.   HENT:      Head: Normocephalic and atraumatic.   Eyes:      Conjunctiva/sclera: Conjunctivae normal.   Cardiovascular:      Rate and Rhythm: Normal rate. Rhythm irregular.      Heart sounds: Normal heart sounds.   Pulmonary:      Effort: Pulmonary effort is normal.      Breath sounds: Normal breath sounds.   Skin:     General: Skin is warm and dry.   Neurological:      Mental Status: He is alert. Mental status is at baseline.   Psychiatric:         Mood and Affect: Mood normal.         Behavior: Behavior normal.         Labs: POC " A1C 6.2    Assessment & Plan:     57 y.o. male with the following -     Problem List Items Addressed This Visit       Essential hypertension     This is a chronic condition, controlled.  Blood pressure is at goal under 140/90 in the office today.  We will continue the current regimen.  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 Has been on ozempic 0.5 mg for some time for T2DM. Will increase to 1 mg q7days for weight loss benefits.         Relevant Medications    Semaglutide, 1 MG/DOSE, 4 MG/3ML Solution Pen-injector    Other Relevant Orders    Patient identified as having weight management issue.  Appropriate orders and counseling given.    Type 2 diabetes mellitus without complication, without long-term current use of insulin ([facility])     A1C shows sugars are well controlled  Patient overall doing well with this, since going up on semaglutide, will have patient get glucometer to make sure he does not have hypoglycemia, may need to go down on metformin or not use ozempic for weight loss if problematic.         Relevant Medications    Semaglutide, 1 MG/DOSE, 4 MG/3ML Solution Pen-injector    Blood Glucose Meter Kit    Blood Glucose Test Strips    Lancets    Alcohol Swabs    Other Relevant Orders    POCT  A1C (Completed)         Return in about 8 weeks (around 3/17/2023) for Diabetes F/U, Lifestyle.    Please note that this dictation was created using voice recognition software. I have made every reasonable attempt to correct obvious errors, but I expect  that there are errors of grammar and possibly content that I did not discover before finalizing the note.

## 2023-01-20 NOTE — ASSESSMENT & PLAN NOTE
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 Has been on ozempic 0.5 mg for some time for T2DM. Will increase to 1 mg q7days for weight loss benefits.

## 2023-01-20 NOTE — ASSESSMENT & PLAN NOTE
This is a chronic condition, controlled.  Blood pressure is at goal under 140/90 in the office today.  We will continue the current regimen.  - initial BP was just a little over  repeat in better range  - may need to adjust further but will continue current regimen of amlodipine 10 mg and Losartan 100 mg

## 2023-01-20 NOTE — ASSESSMENT & PLAN NOTE
A1C shows sugars are well controlled  Patient overall doing well with this, since going up on semaglutide, will have patient get glucometer to make sure he does not have hypoglycemia, may need to go down on metformin or not use ozempic for weight loss if problematic.

## 2023-01-27 ENCOUNTER — OFFICE VISIT (OUTPATIENT)
Dept: URGENT CARE | Facility: PHYSICIAN GROUP | Age: 58
End: 2023-01-27
Payer: COMMERCIAL

## 2023-01-27 VITALS
TEMPERATURE: 98.7 F | BODY MASS INDEX: 42.09 KG/M2 | WEIGHT: 294 LBS | DIASTOLIC BLOOD PRESSURE: 80 MMHG | RESPIRATION RATE: 18 BRPM | HEART RATE: 100 BPM | SYSTOLIC BLOOD PRESSURE: 132 MMHG | OXYGEN SATURATION: 95 % | HEIGHT: 70 IN

## 2023-01-27 DIAGNOSIS — L08.9 FINGER INFECTION: ICD-10-CM

## 2023-01-27 PROCEDURE — 99213 OFFICE O/P EST LOW 20 MIN: CPT | Performed by: STUDENT IN AN ORGANIZED HEALTH CARE EDUCATION/TRAINING PROGRAM

## 2023-01-27 RX ORDER — AMOXICILLIN 500 MG/1
500 CAPSULE ORAL 2 TIMES DAILY
Qty: 10 CAPSULE | Refills: 0 | Status: SHIPPED | OUTPATIENT
Start: 2023-01-27 | End: 2023-02-01

## 2023-01-27 RX ORDER — SULFAMETHOXAZOLE AND TRIMETHOPRIM 800; 160 MG/1; MG/1
1 TABLET ORAL 2 TIMES DAILY
Qty: 10 TABLET | Refills: 0 | Status: SHIPPED | OUTPATIENT
Start: 2023-01-27 | End: 2023-02-01

## 2023-01-27 ASSESSMENT — ENCOUNTER SYMPTOMS
FOCAL WEAKNESS: 0
FEVER: 0
TINGLING: 0
SENSORY CHANGE: 0
CHILLS: 0
WEAKNESS: 0

## 2023-01-27 ASSESSMENT — PAIN SCALES - GENERAL: PAINLEVEL: 6=MODERATE PAIN

## 2023-01-27 ASSESSMENT — FIBROSIS 4 INDEX: FIB4 SCORE: 0.94

## 2023-01-27 NOTE — PROGRESS NOTES
"Subjective     Braden Curz is a 57 y.o. male who presents with Finger Injury (R pointer, x 3 weeks, jammed a pieces of metal that went in and out, px 6, bruising, )            Braden is a 57 y.o. male who presents with finger injury of right index finger.  Patient states he was working in the garage and cut his right index finger approximately 3 weeks ago.  Patient states he cleaned after COVID.  States Was with metal wire.  Patient states that he has had associated swelling at site of injury which has progressively worsened over the last couple days.  Patient has noticed swelling, throbbing, redness and increased warmth.  Has noticed some decreased range of motion secondary to swelling.  No radiation of pain or numbness/tingling/change of sensation.  No fever/chills.      Review of Systems   Constitutional:  Negative for chills, fever and malaise/fatigue.   Musculoskeletal:  Positive for joint pain.   Neurological:  Negative for tingling, sensory change, focal weakness and weakness.   All other systems reviewed and are negative.           Objective     /80 (BP Location: Right arm, Patient Position: Sitting, BP Cuff Size: Adult)   Pulse 100   Temp 37.1 °C (98.7 °F) (Temporal)   Resp 18   Ht 1.778 m (5' 10\")   Wt (!) 133 kg (294 lb)   SpO2 95%   BMI 42.18 kg/m²      Physical Exam  Vitals reviewed.   Constitutional:       General: He is not in acute distress.     Appearance: Normal appearance. He is not ill-appearing or toxic-appearing.   HENT:      Head: Normocephalic and atraumatic.   Cardiovascular:      Rate and Rhythm: Normal rate.   Pulmonary:      Effort: Pulmonary effort is normal.   Musculoskeletal:      Right wrist: Normal.      Right hand: Swelling present. Normal strength. Normal sensation. Normal capillary refill. Normal pulse.        Hands:       Comments: Right index finer: Localized swelling and erythema with tenderness and increased temperate. Accumulation of purulent " drainage under skin. No current active drainage.   Skin:     General: Skin is warm and dry.      Capillary Refill: Capillary refill takes less than 2 seconds.   Neurological:      General: No focal deficit present.      Mental Status: He is alert. Mental status is at baseline.                           Assessment & Plan        1. Finger infection  - sulfamethoxazole-trimethoprim (BACTRIM DS) 800-160 MG tablet; Take 1 Tablet by mouth 2 times a day for 5 days.  Dispense: 10 Tablet; Refill: 0  - amoxicillin (AMOXIL) 500 MG Cap; Take 1 Capsule by mouth 2 times a day for 5 days.  Dispense: 10 Capsule; Refill: 0     Due to NAINA, patient to to be started on dual antibiotic therapy with Bactrim and amoxicillin daily for the next 5 days.  Since he is up-to-date on tetanus. Supportive care measures, wound care instructions and indications for immediate follow-up discussed with patient. Pathogenesis of diagnosis discussed including typical length and natural progression.      Instructed to return to urgent care or nearest emergency department if symptoms fail to improve, for any change in condition, further concerns, or new concerning symptoms.    Patient states understanding and agrees with the plan of care and discharge instructions.

## 2023-02-13 RX ORDER — MELOXICAM 15 MG/1
TABLET ORAL
Qty: 60 TABLET | Refills: 2 | Status: SHIPPED | OUTPATIENT
Start: 2023-02-13 | End: 2024-01-30

## 2023-03-06 ENCOUNTER — OFFICE VISIT (OUTPATIENT)
Dept: MEDICAL GROUP | Facility: MEDICAL CENTER | Age: 58
End: 2023-03-06
Payer: COMMERCIAL

## 2023-03-06 VITALS
OXYGEN SATURATION: 96 % | BODY MASS INDEX: 41.49 KG/M2 | DIASTOLIC BLOOD PRESSURE: 84 MMHG | TEMPERATURE: 97.9 F | HEIGHT: 70 IN | SYSTOLIC BLOOD PRESSURE: 140 MMHG | HEART RATE: 99 BPM | WEIGHT: 289.8 LBS

## 2023-03-06 DIAGNOSIS — G47.26 CIRCADIAN RHYTHM SLEEP DISORDER, SHIFT WORK TYPE: ICD-10-CM

## 2023-03-06 DIAGNOSIS — M54.50 CHRONIC BILATERAL LOW BACK PAIN, UNSPECIFIED WHETHER SCIATICA PRESENT: ICD-10-CM

## 2023-03-06 DIAGNOSIS — M25.551 ACUTE HIP PAIN, RIGHT: ICD-10-CM

## 2023-03-06 DIAGNOSIS — G89.29 CHRONIC BILATERAL LOW BACK PAIN, UNSPECIFIED WHETHER SCIATICA PRESENT: ICD-10-CM

## 2023-03-06 PROCEDURE — 99214 OFFICE O/P EST MOD 30 MIN: CPT | Performed by: FAMILY MEDICINE

## 2023-03-06 RX ORDER — ZOLPIDEM TARTRATE 10 MG/1
10 TABLET ORAL NIGHTLY PRN
Qty: 30 TABLET | Refills: 0 | Status: SHIPPED | OUTPATIENT
Start: 2023-03-06 | End: 2023-06-04

## 2023-03-06 RX ORDER — TIZANIDINE 4 MG/1
4 TABLET ORAL EVERY 6 HOURS PRN
Qty: 30 TABLET | Refills: 3 | Status: SHIPPED | OUTPATIENT
Start: 2023-03-06 | End: 2024-01-30

## 2023-03-06 RX ORDER — METHYLPREDNISOLONE 4 MG/1
TABLET ORAL
Qty: 21 TABLET | Refills: 0 | Status: SHIPPED | OUTPATIENT
Start: 2023-03-06 | End: 2023-06-12

## 2023-03-06 ASSESSMENT — FIBROSIS 4 INDEX: FIB4 SCORE: 0.94

## 2023-03-06 NOTE — PROGRESS NOTES
"Subjective:     CC: \"right hip pain\"    HPI:   Braden presents today with his wife:    Sudden onset of hip pain, right side  Tuesday last week in the evening got up out of chair, pain became prominent. Hard to walk, drive as difficult to put pressure on gas pedal, sleep on that side.   Does have known significant arthritis in his knees, had recent injection in his knees at [facility]  Pain constant 7 and with movement 9-10  Described like a throbbing, but then can be sharp shooting pain that radiates out of hip and goes down front of leg down to knee but hip is tender on the lateral portion  Does make his knee throb  No rashes, fevers, chills, no urinary or stool changes  Does have chronic back pain, has significant hardware in his back, with last surgery in early 2000's  Has had increased back spasms recently    Problem   Circadian Rhythm Sleep Disorder, Shift Work Type    Has to get up at 1am for work on weekdays         Current Outpatient Medications Ordered in Epic   Medication Sig Dispense Refill    zolpidem (AMBIEN) 10 MG Tab Take 1 Tablet by mouth at bedtime as needed for Sleep (use once a week for sleep schedule change) for up to 90 days. 30 Tablet 0    methylPREDNISolone (MEDROL DOSEPAK) 4 MG Tablet Therapy Pack As directed on the packaging label. 21 Tablet 0    tizanidine (ZANAFLEX) 4 MG Tab Take 1 Tablet by mouth every 6 hours as needed (back pain and hip pain). 30 Tablet 3    Semaglutide, 1 MG/DOSE, 4 MG/3ML Solution Pen-injector Inject 1 mg under the skin every 7 days. 3 mL 0    meloxicam (MOBIC) 15 MG tablet TAKE 1 TABLET BY MOUTH DAILY 60 Tablet 2    Blood Glucose Meter Kit Test blood sugar as recommended by provider. [employer] Freedom Lite blood glucose monitoring kit. 1 Kit 0    Blood Glucose Test Strips Use one Abbott Freedom Lite strip to test blood sugar once daily early morning before first meal. 100 Strip 0    Lancets Use one Abbott [employer] Lite lancet to test blood sugar once daily early morning before " "first meal. 100 Each 0    Alcohol Swabs Wipe site with prep pad prior to injection. 100 Each 0    hydrOXYzine HCl (ATARAX) 25 MG Tab TAKE ONE TABLET BY MOUTH THREE TIMES A DAY AS NEEDED FOR ITCHING / ANXIETY 90 Tablet 5    clobetasol (TEMOVATE) 0.05 % Ointment Apply 1 Application topically every day. 15 g 1    losartan (COZAAR) 100 MG Tab Take 1 Tablet by mouth every evening. 100 Tablet 3    atorvastatin (LIPITOR) 40 MG Tab Take 1 Tablet by mouth every evening. 90 Tablet 3    amLODIPine (NORVASC) 10 MG Tab Take 1 Tablet by mouth every evening. 90 Tablet 3    metFORMIN ER (GLUCOPHAGE XR) 500 MG TABLET SR 24 HR Take 1 Tablet by mouth 2 times a day. 180 Tablet 3    aspirin EC (ECOTRIN) 81 MG Tablet Delayed Response Take 1 tablet by mouth every day. 30 tablet 11    cyanocobalamin (VITAMIN B12) 1000 MCG Tab Take 1 Tab by mouth every day. 30 Tab 11    Psyllium (METAMUCIL FIBER PO) Take  by mouth every day.       No current [facility]-ordered facility-administered medications on file.       Health Maintenance: acute visit    ROS:  ROS see HPI    Objective:     Exam:  BP (!) 140/84   Pulse 99   Temp 36.6 °C (97.9 °F) (Temporal)   Ht 1.778 m (5' 10\")   Wt (!) 131 kg (289 lb 12.8 oz)   SpO2 96%   BMI 41.58 kg/m²  Body mass index is 41.58 kg/m².    Physical Exam  Vitals reviewed.   Constitutional:       General: He is not in acute distress.     Appearance: Normal appearance. He is obese.   HENT:      Head: Normocephalic and atraumatic.   Cardiovascular:      Rate and Rhythm: Normal rate and regular rhythm.      Heart sounds: Normal heart sounds.   Pulmonary:      Effort: Pulmonary effort is normal. No respiratory distress.      Breath sounds: Normal breath sounds.   Musculoskeletal:         General: Tenderness present. No swelling or deformity.      Comments: Lower back, piriformis and lateral right hip are tender to palpation, worse at hip    Positive straight leg raise   Skin:     General: Skin is warm and dry.      " Findings: No rash.   Neurological:      Mental Status: He is alert. Mental status is at baseline.      Gait: Gait abnormal (walks with limp).   Psychiatric:         Mood and Affect: Mood normal.         Behavior: Behavior normal.         Assessment & Plan:     57 y.o. male with the following -     Problem List Items Addressed This Visit       Circadian rhythm sleep disorder, shift work type     Reports pharmacy denies receiving last prescription, PDMP reviewed and has not been filled, will try resending last refill         Relevant Medications    zolpidem (AMBIEN) 10 MG Tab     Other Visit Diagnoses       Acute hip pain, right        Relevant Medications    methylPREDNISolone (MEDROL DOSEPAK) 4 MG Tablet Therapy Pack    tizanidine (ZANAFLEX) 4 MG Tab    Other Relevant Orders    DX-HIP-COMPLETE - UNILATERAL 2+ RIGHT    Referral to Orthopedics    Chronic bilateral low back pain, unspecified whether sciatica present        Relevant Medications    methylPREDNISolone (MEDROL DOSEPAK) 4 MG Tablet Therapy Pack    tizanidine (ZANAFLEX) 4 MG Tab    Other Relevant Orders    DX-LUMBAR SPINE-2 OR 3 VIEWS    Referral to Orthopedics          Discussed with patient and wife that could be coming from his back given his chronic back problems and radiation down legs. Does not have pain in groin so does not seem intrinsic to hip joint. Does have spot tenderness to lateral hip so may be bursitis. I did order imaging, medrol dose pack and muscle relaxer however patient and wife counseled that more definitive work up and treatment will probably come from an orthopedic doctor, they may go to [facility] to get checked out further.      Return if symptoms worsen or fail to improve.    Please note that this dictation was created using voice recognition software. I have made every reasonable attempt to correct obvious errors, but I expect that there are errors of grammar and possibly content that I did not discover before finalizing the  note.

## 2023-03-06 NOTE — ASSESSMENT & PLAN NOTE
Reports pharmacy denies receiving last prescription, PDMP reviewed and has not been filled, will try resending last refill

## 2023-03-06 NOTE — LETTER
[place] [facility] [address] [facility] [address]     March 6, 2023    Patient: Braden Cruz   YOB: 1965   Date of Visit: 3/6/2023       To Whom It May Concern:    Braden Cruz was seen and treated in our department on 3/6/2023. He may need to miss work for medical reasons, please excuse until 3/13/2023.    Sincerely,         Greg Reaves D.O.

## 2023-04-17 RX ORDER — SEMAGLUTIDE 1.34 MG/ML
INJECTION, SOLUTION SUBCUTANEOUS
Qty: 3 ML | Refills: 0 | Status: SHIPPED | OUTPATIENT
Start: 2023-04-17 | End: 2023-05-19 | Stop reason: SDUPTHER

## 2023-05-19 DIAGNOSIS — E78.5 DYSLIPIDEMIA: ICD-10-CM

## 2023-05-19 DIAGNOSIS — E11.9 TYPE 2 DIABETES MELLITUS WITHOUT COMPLICATION, WITHOUT LONG-TERM CURRENT USE OF INSULIN (HCC): ICD-10-CM

## 2023-05-19 DIAGNOSIS — Z12.5 PROSTATE CANCER SCREENING: ICD-10-CM

## 2023-05-19 DIAGNOSIS — I10 ESSENTIAL HYPERTENSION: ICD-10-CM

## 2023-05-19 DIAGNOSIS — E53.8 VITAMIN B12 DEFICIENCY: ICD-10-CM

## 2023-05-19 DIAGNOSIS — E66.01 MORBID OBESITY WITH BMI OF 40.0-44.9, ADULT (HCC): ICD-10-CM

## 2023-05-19 DIAGNOSIS — Z13.21 ENCOUNTER FOR VITAMIN DEFICIENCY SCREENING: ICD-10-CM

## 2023-05-23 PROBLEM — G89.29 CHRONIC PAIN OF RIGHT KNEE: Status: ACTIVE | Noted: 2023-05-23

## 2023-05-23 PROBLEM — M25.561 CHRONIC PAIN OF RIGHT KNEE: Status: ACTIVE | Noted: 2023-05-23

## 2023-05-23 PROBLEM — M17.11 TRICOMPARTMENT OSTEOARTHRITIS OF RIGHT KNEE: Status: ACTIVE | Noted: 2023-05-23

## 2023-06-07 ENCOUNTER — HOSPITAL ENCOUNTER (OUTPATIENT)
Dept: LAB | Facility: MEDICAL CENTER | Age: 58
End: 2023-06-07
Attending: FAMILY MEDICINE
Payer: COMMERCIAL

## 2023-06-07 DIAGNOSIS — Z13.21 ENCOUNTER FOR VITAMIN DEFICIENCY SCREENING: ICD-10-CM

## 2023-06-07 DIAGNOSIS — E78.5 DYSLIPIDEMIA: ICD-10-CM

## 2023-06-07 DIAGNOSIS — E66.01 MORBID OBESITY WITH BMI OF 40.0-44.9, ADULT (HCC): ICD-10-CM

## 2023-06-07 DIAGNOSIS — E11.9 TYPE 2 DIABETES MELLITUS WITHOUT COMPLICATION, WITHOUT LONG-TERM CURRENT USE OF INSULIN (HCC): ICD-10-CM

## 2023-06-07 DIAGNOSIS — I10 ESSENTIAL HYPERTENSION: ICD-10-CM

## 2023-06-07 DIAGNOSIS — Z12.5 PROSTATE CANCER SCREENING: ICD-10-CM

## 2023-06-07 DIAGNOSIS — E53.8 VITAMIN B12 DEFICIENCY: ICD-10-CM

## 2023-06-07 LAB
25(OH)D3 SERPL-MCNC: 13 NG/ML (ref 30–100)
ALBUMIN SERPL BCP-MCNC: 4.2 G/DL (ref 3.2–4.9)
ALBUMIN/GLOB SERPL: 1.3 G/DL
ALP SERPL-CCNC: 103 U/L (ref 30–99)
ALT SERPL-CCNC: 22 U/L (ref 2–50)
ANION GAP SERPL CALC-SCNC: 12 MMOL/L (ref 7–16)
AST SERPL-CCNC: 18 U/L (ref 12–45)
BILIRUB SERPL-MCNC: 0.7 MG/DL (ref 0.1–1.5)
BUN SERPL-MCNC: 11 MG/DL (ref 8–22)
CALCIUM ALBUM COR SERPL-MCNC: 9.5 MG/DL (ref 8.5–10.5)
CALCIUM SERPL-MCNC: 9.7 MG/DL (ref 8.5–10.5)
CHLORIDE SERPL-SCNC: 105 MMOL/L (ref 96–112)
CHOLEST SERPL-MCNC: 145 MG/DL (ref 100–199)
CO2 SERPL-SCNC: 25 MMOL/L (ref 20–33)
CREAT SERPL-MCNC: 0.67 MG/DL (ref 0.5–1.4)
CREAT UR-MCNC: 184.01 MG/DL
ERYTHROCYTE [DISTWIDTH] IN BLOOD BY AUTOMATED COUNT: 44.1 FL (ref 35.9–50)
EST. AVERAGE GLUCOSE BLD GHB EST-MCNC: 131 MG/DL
FASTING STATUS PATIENT QL REPORTED: NORMAL
GFR SERPLBLD CREATININE-BSD FMLA CKD-EPI: 107 ML/MIN/1.73 M 2
GLOBULIN SER CALC-MCNC: 3.3 G/DL (ref 1.9–3.5)
GLUCOSE SERPL-MCNC: 99 MG/DL (ref 65–99)
HBA1C MFR BLD: 6.2 % (ref 4–5.6)
HCT VFR BLD AUTO: 48.7 % (ref 42–52)
HDLC SERPL-MCNC: 39 MG/DL
HGB BLD-MCNC: 16 G/DL (ref 14–18)
LDLC SERPL CALC-MCNC: 88 MG/DL
MCH RBC QN AUTO: 31.4 PG (ref 27–33)
MCHC RBC AUTO-ENTMCNC: 32.9 G/DL (ref 32.3–36.5)
MCV RBC AUTO: 95.7 FL (ref 81.4–97.8)
MICROALBUMIN UR-MCNC: <1.2 MG/DL
MICROALBUMIN/CREAT UR: NORMAL MG/G (ref 0–30)
PLATELET # BLD AUTO: 303 K/UL (ref 164–446)
PMV BLD AUTO: 9.1 FL (ref 9–12.9)
POTASSIUM SERPL-SCNC: 4.1 MMOL/L (ref 3.6–5.5)
PROT SERPL-MCNC: 7.5 G/DL (ref 6–8.2)
PSA SERPL-MCNC: 1.25 NG/ML (ref 0–4)
RBC # BLD AUTO: 5.09 M/UL (ref 4.7–6.1)
SODIUM SERPL-SCNC: 142 MMOL/L (ref 135–145)
TRIGL SERPL-MCNC: 91 MG/DL (ref 0–149)
VIT B12 SERPL-MCNC: 1286 PG/ML (ref 211–911)
WBC # BLD AUTO: 9.4 K/UL (ref 4.8–10.8)

## 2023-06-07 PROCEDURE — 80061 LIPID PANEL: CPT

## 2023-06-07 PROCEDURE — 82306 VITAMIN D 25 HYDROXY: CPT

## 2023-06-07 PROCEDURE — 82043 UR ALBUMIN QUANTITATIVE: CPT

## 2023-06-07 PROCEDURE — 36415 COLL VENOUS BLD VENIPUNCTURE: CPT

## 2023-06-07 PROCEDURE — 84153 ASSAY OF PSA TOTAL: CPT

## 2023-06-07 PROCEDURE — 82607 VITAMIN B-12: CPT

## 2023-06-07 PROCEDURE — 82570 ASSAY OF URINE CREATININE: CPT

## 2023-06-07 PROCEDURE — 80053 COMPREHEN METABOLIC PANEL: CPT

## 2023-06-07 PROCEDURE — 85027 COMPLETE CBC AUTOMATED: CPT

## 2023-06-07 PROCEDURE — 83036 HEMOGLOBIN GLYCOSYLATED A1C: CPT

## 2023-06-12 ENCOUNTER — OFFICE VISIT (OUTPATIENT)
Dept: MEDICAL GROUP | Facility: MEDICAL CENTER | Age: 58
End: 2023-06-12
Payer: COMMERCIAL

## 2023-06-12 VITALS
OXYGEN SATURATION: 96 % | SYSTOLIC BLOOD PRESSURE: 132 MMHG | HEIGHT: 70 IN | HEART RATE: 95 BPM | DIASTOLIC BLOOD PRESSURE: 68 MMHG | WEIGHT: 284 LBS | BODY MASS INDEX: 40.66 KG/M2 | TEMPERATURE: 97.5 F

## 2023-06-12 DIAGNOSIS — E55.9 VITAMIN D DEFICIENCY: ICD-10-CM

## 2023-06-12 DIAGNOSIS — E11.9 TYPE 2 DIABETES MELLITUS WITHOUT COMPLICATION, WITHOUT LONG-TERM CURRENT USE OF INSULIN (HCC): ICD-10-CM

## 2023-06-12 DIAGNOSIS — Z00.00 WELLNESS EXAMINATION: ICD-10-CM

## 2023-06-12 DIAGNOSIS — R12 HEART BURN: ICD-10-CM

## 2023-06-12 DIAGNOSIS — B35.1 ONYCHOMYCOSIS: ICD-10-CM

## 2023-06-12 PROBLEM — E78.5 HYPERLIPIDEMIA: Status: ACTIVE | Noted: 2023-06-12

## 2023-06-12 PROCEDURE — 3075F SYST BP GE 130 - 139MM HG: CPT | Performed by: FAMILY MEDICINE

## 2023-06-12 PROCEDURE — 92250 FUNDUS PHOTOGRAPHY W/I&R: CPT | Mod: TC | Performed by: FAMILY MEDICINE

## 2023-06-12 PROCEDURE — 3078F DIAST BP <80 MM HG: CPT | Performed by: FAMILY MEDICINE

## 2023-06-12 PROCEDURE — 99396 PREV VISIT EST AGE 40-64: CPT | Performed by: FAMILY MEDICINE

## 2023-06-12 RX ORDER — KETOCONAZOLE 20 MG/G
1 CREAM TOPICAL DAILY
Qty: 15 G | Refills: 0 | Status: SHIPPED | OUTPATIENT
Start: 2023-06-12

## 2023-06-12 RX ORDER — ERGOCALCIFEROL 1.25 MG/1
50000 CAPSULE ORAL
Qty: 6 CAPSULE | Refills: 0 | Status: SHIPPED | OUTPATIENT
Start: 2023-06-12 | End: 2023-07-05 | Stop reason: SDUPTHER

## 2023-06-12 RX ORDER — PANTOPRAZOLE SODIUM 20 MG/1
20 TABLET, DELAYED RELEASE ORAL DAILY
Qty: 14 TABLET | Refills: 0 | Status: SHIPPED | OUTPATIENT
Start: 2023-06-12 | End: 2024-01-30

## 2023-06-12 ASSESSMENT — FIBROSIS 4 INDEX: FIB4 SCORE: 0.75

## 2023-06-12 NOTE — PROGRESS NOTES
Subjective:     CC:   Chief Complaint   Patient presents with    Annual Exam       HPI:   Braden Cruz is a 58 y.o. male who presents for an annual exam. He is feeling well and has no complaints.    Health Maintenance  Vitamin D:     Cholesterol Screening:     Diabetes Screening:     AAA Screening: N/A  Aspirin Use: 81 mg  Diet: Mostly likes meat and potatoes, does eat some green salads  Exercise: nothing, will be doing PT with knee surgery  Substance Abuse: recently quit hard alcohol, down to 1 beer or 1 wine per week  Safe in relationship.   Seat belts, gun safety discussed.    Cancer screening  Colorectal Cancer Screening: Completed 1/14/23 with a 5 year recall  Lung Cancer Screening: N/A  Prostate Cancer Screening/PSA:       Infectious disease screening/Immunizations  --STI Screening: N/A  --Practices safe sex.  --HIV Screening: N/A  --Hepatitis C Screening: completed 8/27/19  --Immunizations:    Influenza: recommend yearly   HPV:  N/A   Tetanus: Up-to-date   Shingles: recommended   Pneumococcal : recommended     COVID-19: Covid x2    He  has a past medical history of Hypertension, Lab test positive for detection of COVID-19 virus (1/3/2022), and Pain.  He  has a past surgical history that includes fusion, spine, lumbar, plif (1999, 2001); knee arthroscopy (Right, 2006); knee arthroscopy (Bilateral, 5/30/2019); and meniscectomy, knee, medial (Bilateral, 5/30/2019).  Family History   Problem Relation Age of Onset    No Known Problems Mother     Hypertension Father     No Known Problems Sister     No Known Problems Brother     Heart Attack Maternal Uncle     No Known Problems Maternal Grandmother     Heart Attack Maternal Grandfather     Heart Attack Paternal Grandmother     No Known Problems Paternal Grandfather      Social History     Tobacco Use    Smoking status: Never    Smokeless tobacco: Never   Vaping Use    Vaping Use: Never used   Substance Use Topics    Alcohol use: Yes     Comment: 1-2  per day    Drug use: No       Patient Active Problem List    Diagnosis Date Noted    Hyperlipidemia 06/12/2023    Chronic pain of right knee 05/23/2023    Tricompartment osteoarthritis of right knee 05/23/2023    Onychomycosis 06/28/2022    Dyslipidemia 01/22/2021    Vitamin B12 deficiency 08/28/2019    Chronic pain of both knees 04/01/2019    Type 2 diabetes mellitus without complication ([facility]) 12/13/2018    Circadian rhythm sleep disorder, shift work type 09/12/2018    Morbid obesity with BMI of 40.0-44.9, adult ([facility]) 09/12/2018    Essential hypertension 09/11/2017       Current Outpatient Medications   Medication Sig Dispense Refill    vitamin D2, Ergocalciferol, (DRISDOL) 1.25 MG (29667 UT) Cap capsule Take 1 Capsule by mouth every 7 days. 6 Capsule 0    ketoconazole (NIZORAL) 2 % Cream Apply 1 Application topically every day. 15 g 0    pantoprazole (PROTONIX) 20 MG tablet Take 1 Tablet by mouth every day. 14 Tablet 0    atorvastatin (LIPITOR) 20 MG Tab atorvastatin 20 mg tablet      losartan (COZAAR) 50 MG Tab losartan 50 mg tablet      Semaglutide, 1 MG/DOSE, (OZEMPIC, 1 MG/DOSE,) 4 MG/3ML Solution Pen-injector Inject 1 mg under the skin every 7 days. 3 mL 11    tizanidine (ZANAFLEX) 4 MG Tab Take 1 Tablet by mouth every 6 hours as needed (back pain and hip pain). 30 Tablet 3    meloxicam (MOBIC) 15 MG tablet TAKE 1 TABLET BY MOUTH DAILY 60 Tablet 2    Blood Glucose Meter Kit Test blood sugar as recommended by provider. Atkinson Freedom Lite blood glucose monitoring kit. 1 Kit 0    Blood Glucose Test Strips Use one Abbott Freedom Lite strip to test blood sugar once daily early morning before first meal. 100 Strip 0    Lancets Use one Abbott Desti Lite lancet to test blood sugar once daily early morning before first meal. 100 Each 0    Alcohol Swabs Wipe site with prep pad prior to injection. 100 Each 0    hydrOXYzine HCl (ATARAX) 25 MG Tab TAKE ONE TABLET BY MOUTH THREE TIMES A DAY AS NEEDED FOR ITCHING /  "ANXIETY 90 Tablet 5    clobetasol (TEMOVATE) 0.05 % Ointment Apply 1 Application topically every day. 15 g 1    losartan (COZAAR) 100 MG Tab Take 1 Tablet by mouth every evening. 100 Tablet 3    atorvastatin (LIPITOR) 40 MG Tab Take 1 Tablet by mouth every evening. 90 Tablet 3    amLODIPine (NORVASC) 10 MG Tab Take 1 Tablet by mouth every evening. 90 Tablet 3    metFORMIN ER (GLUCOPHAGE XR) 500 MG TABLET SR 24 HR Take 1 Tablet by mouth 2 times a day. 180 Tablet 3    aspirin EC (ECOTRIN) 81 MG Tablet Delayed Response Take 1 tablet by mouth every day. 30 tablet 11    cyanocobalamin (VITAMIN B12) 1000 MCG Tab Take 1 Tab by mouth every day. 30 Tab 11    Psyllium (METAMUCIL FIBER PO) Take  by mouth every day.       No current facility-administered medications for this visit.    (including changes today)  Allergies: Patient has no known allergies.    Review of Systems   Constitutional: Negative for fever, chills and malaise/fatigue.   HENT: Negative for congestion.    Eyes: Negative for pain.   Respiratory: Negative for cough and shortness of breath.    Cardiovascular: Negative for leg swelling.   Gastrointestinal: Negative for nausea, vomiting, abdominal pain and diarrhea.   Genitourinary: Negative for dysuria and hematuria.   Skin: Negative for rash.   Neurological: Negative for dizziness, focal weakness and headaches.   Endo/Heme/Allergies: Does not bleed easily.   Psychiatric/Behavioral: Negative for depression.  The patient is not nervous/anxious.      Objective:     /68   Pulse 95   Temp 36.4 °C (97.5 °F) (Temporal)   Ht 1.778 m (5' 10\")   Wt (!) 129 kg (284 lb)   SpO2 96%   BMI 40.75 kg/m²   Body mass index is 40.75 kg/m².  Wt Readings from Last 4 Encounters:   06/12/23 (!) 129 kg (284 lb)   05/23/23 (!) 130 kg (287 lb)   03/06/23 (!) 136 kg (300 lb)   03/06/23 (!) 131 kg (289 lb 12.8 oz)       Physical Exam:  Constitutional: Well-developed and well-nourished. Not diaphoretic. No distress.   Skin: Skin " is warm and dry. No rash noted.  Head: Atraumatic without lesions.  Eyes: Conjunctivae and extraocular motions are normal.   Ears:  External ears unremarkable.  Nose: Nares patent. Septum midline.   Neck: Supple, trachea midline. Normal range of motion. No thyromegaly present. No lymphadenopathy--cervical or supraclavicular.  Cardiovascular: Regular rate and rhythm, S1 and S2 without murmur, rubs, or gallops.    Lungs: Effort normal. Clear to auscultation throughout. No adventitious sounds. No CVA tenderness.  Abdomen: Soft, mild epigastric tenderness, and without distention. Active bowel sounds in all four quadrants. No rebound, guarding, masses or HSM.  Extremities: No cyanosis, clubbing, erythema, nor edema. Distal pulses intact and symmetric.   Physical Exam  Cardiovascular:      Pulses:           Dorsalis pedis pulses are 2+ on the right side and 2+ on the left side.   Feet:      Right foot:      Protective Sensation: 4 sites tested.  4 sites sensed.      Skin integrity: Dry skin present.      Toenail Condition: Right toenails are long.      Left foot:      Protective Sensation: 4 sites tested.  4 sites sensed.      Skin integrity: Dry skin present.      Toenail Condition: Left toenails are long. Fungal disease present.       Musculoskeletal: All major joints AROM full in all directions without pain.  Neurological: Alert and oriented x 3.   Psychiatric:  Behavior, mood, and affect are appropriate.      Assessment and Plan:     1. Wellness examination  Age appropriate guidance and Counseling about diet, supplements, exercise, vaccines and cancer screening    2. Type 2 diabetes mellitus without complication, without long-term current use of insulin ([facility])  - POCT Retinal Eye Exam  - Diabetic Monofilament LE Exam    3. Vitamin D deficiency  - vitamin D2, Ergocalciferol, (DRISDOL) 1.25 MG (53159 UT) Cap capsule; Take 1 Capsule by mouth every 7 days.  Dispense: 6 Capsule; Refill: 0    4. Onychomycosis  - ketoconazole  (NIZORAL) 2 % Cream; Apply 1 Application topically every day.  Dispense: 15 g; Refill: 0    5. Heart burn  - pantoprazole (PROTONIX) 20 MG tablet; Take 1 Tablet by mouth every day.  Dispense: 14 Tablet; Refill: 0        HCM: POC Retina.  Labs per orders.  Vaccinations per orders.      Follow-up: Return in about 6 months (around 12/12/2023), or if symptoms worsen or fail to improve, for Diabetes F/U.

## 2023-07-05 DIAGNOSIS — E55.9 VITAMIN D DEFICIENCY: ICD-10-CM

## 2023-07-05 RX ORDER — ERGOCALCIFEROL 1.25 MG/1
50000 CAPSULE ORAL
Qty: 6 CAPSULE | Refills: 0 | Status: SHIPPED | OUTPATIENT
Start: 2023-07-05

## 2023-07-29 ENCOUNTER — PATIENT MESSAGE (OUTPATIENT)
Dept: MEDICAL GROUP | Facility: MEDICAL CENTER | Age: 58
End: 2023-07-29
Payer: COMMERCIAL

## 2023-07-29 DIAGNOSIS — G47.26 CIRCADIAN RHYTHM SLEEP DISORDER, SHIFT WORK TYPE: ICD-10-CM

## 2023-08-01 RX ORDER — ZOLPIDEM TARTRATE 10 MG/1
10 TABLET ORAL NIGHTLY PRN
Qty: 30 TABLET | Refills: 0 | Status: SHIPPED | OUTPATIENT
Start: 2023-08-01 | End: 2023-10-30

## 2023-09-18 ENCOUNTER — APPOINTMENT (OUTPATIENT)
Dept: ADMISSIONS | Facility: MEDICAL CENTER | Age: 58
End: 2023-09-18
Attending: ORTHOPAEDIC SURGERY
Payer: COMMERCIAL

## 2023-09-18 ENCOUNTER — HOSPITAL ENCOUNTER (OUTPATIENT)
Dept: LAB | Facility: MEDICAL CENTER | Age: 58
End: 2023-09-18
Attending: FAMILY MEDICINE
Payer: COMMERCIAL

## 2023-09-22 ENCOUNTER — PRE-ADMISSION TESTING (OUTPATIENT)
Dept: ADMISSIONS | Facility: MEDICAL CENTER | Age: 58
End: 2023-09-22
Attending: ORTHOPAEDIC SURGERY
Payer: COMMERCIAL

## 2023-09-22 VITALS — HEIGHT: 70 IN | BODY MASS INDEX: 40.75 KG/M2

## 2023-09-22 DIAGNOSIS — Z01.812 PRE-OPERATIVE LABORATORY EXAMINATION: ICD-10-CM

## 2023-09-22 DIAGNOSIS — Z01.810 PRE-OPERATIVE CARDIOVASCULAR EXAMINATION: ICD-10-CM

## 2023-09-22 NOTE — PREPROCEDURE INSTRUCTIONS
PreAdmit Telephone Appointment: Reviewed the Preparing for your procedure handout with patient over the phone. Patient instructed per pharmacy guidelines regarding taking or holding regularly prescribed medications before surgery. Instructed to take the following medications the day of surgery with a sip of water per pharmacy medication guidelines: atarax prn     Case manger and testing appointment on 9/26.

## 2023-09-26 ENCOUNTER — APPOINTMENT (OUTPATIENT)
Dept: ADMISSIONS | Facility: MEDICAL CENTER | Age: 58
End: 2023-09-26
Attending: ORTHOPAEDIC SURGERY
Payer: COMMERCIAL

## 2023-09-26 DIAGNOSIS — Z01.810 PRE-OPERATIVE CARDIOVASCULAR EXAMINATION: ICD-10-CM

## 2023-09-26 DIAGNOSIS — Z01.812 PRE-OPERATIVE LABORATORY EXAMINATION: ICD-10-CM

## 2023-09-26 LAB
ANION GAP SERPL CALC-SCNC: 10 MMOL/L (ref 7–16)
BUN SERPL-MCNC: 13 MG/DL (ref 8–22)
CALCIUM SERPL-MCNC: 9.4 MG/DL (ref 8.4–10.2)
CHLORIDE SERPL-SCNC: 104 MMOL/L (ref 96–112)
CO2 SERPL-SCNC: 26 MMOL/L (ref 20–33)
CREAT SERPL-MCNC: 0.67 MG/DL (ref 0.5–1.4)
EKG IMPRESSION: NORMAL
ERYTHROCYTE [DISTWIDTH] IN BLOOD BY AUTOMATED COUNT: 42.2 FL (ref 35.9–50)
EST. AVERAGE GLUCOSE BLD GHB EST-MCNC: 117 MG/DL
GFR SERPLBLD CREATININE-BSD FMLA CKD-EPI: 107 ML/MIN/1.73 M 2
GLUCOSE SERPL-MCNC: 126 MG/DL (ref 65–99)
HBA1C MFR BLD: 5.7 % (ref 4–5.6)
HCT VFR BLD AUTO: 47.1 % (ref 42–52)
HGB BLD-MCNC: 16 G/DL (ref 14–18)
MCH RBC QN AUTO: 30.9 PG (ref 27–33)
MCHC RBC AUTO-ENTMCNC: 34 G/DL (ref 32.3–36.5)
MCV RBC AUTO: 90.9 FL (ref 81.4–97.8)
PLATELET # BLD AUTO: 256 K/UL (ref 164–446)
PMV BLD AUTO: 9.1 FL (ref 9–12.9)
POTASSIUM SERPL-SCNC: 4 MMOL/L (ref 3.6–5.5)
RBC # BLD AUTO: 5.18 M/UL (ref 4.7–6.1)
SCCMEC + MECA PNL NOSE NAA+PROBE: NEGATIVE
SCCMEC + MECA PNL NOSE NAA+PROBE: NEGATIVE
SODIUM SERPL-SCNC: 140 MMOL/L (ref 135–145)
WBC # BLD AUTO: 9.4 K/UL (ref 4.8–10.8)

## 2023-09-26 PROCEDURE — 93005 ELECTROCARDIOGRAM TRACING: CPT

## 2023-09-26 PROCEDURE — 87641 MR-STAPH DNA AMP PROBE: CPT

## 2023-09-26 PROCEDURE — 93010 ELECTROCARDIOGRAM REPORT: CPT | Performed by: INTERNAL MEDICINE

## 2023-09-26 PROCEDURE — 85027 COMPLETE CBC AUTOMATED: CPT

## 2023-09-26 PROCEDURE — 83036 HEMOGLOBIN GLYCOSYLATED A1C: CPT

## 2023-09-26 PROCEDURE — 87640 STAPH A DNA AMP PROBE: CPT | Mod: XU

## 2023-09-26 PROCEDURE — 80048 BASIC METABOLIC PNL TOTAL CA: CPT

## 2023-09-26 PROCEDURE — 36415 COLL VENOUS BLD VENIPUNCTURE: CPT

## 2023-09-26 NOTE — DISCHARGE PLANNING
DISCHARGE PLANNING NOTE - TOTAL JOINT    Procedure: Procedure(s):  ARTHROPLASTY, KNEE, TOTAL, RIGHT  Procedure Date: 10/5/2023  Ins[facility]: [facility] / Plan: [facility] / Product Type: *No Product type* /    Equipment currently available at home?   Ice packs. Pt will need crutches/fww.  Steps into the home? 1  Steps within the home? 0  Toilet height? ADA  Type of shower? walk-in shower  Home Oxygen? No.  Portable tank?    Oxygen Provider:  Who will be with you during your recovery? spouse  Is Outpatient Physical Therapy set up after surgery? Yes  Did you take the Total Joint Class and where? Yes, received NAON book.  Planning same day discharge?Yes     This writer met with pt and educated to preop showers, nasal mrsa swab and potential for overnight stay. CHG kit given to pt. Home safety checklist and equipment resource guide sent home with pt. Pt educated to dc criteria. All questions answered and verbalizes understanding of all instructions. No dc needs identified at this time. Anticipate dc to home without barriers.

## 2023-10-05 ENCOUNTER — APPOINTMENT (OUTPATIENT)
Dept: RADIOLOGY | Facility: MEDICAL CENTER | Age: 58
End: 2023-10-05
Attending: ORTHOPAEDIC SURGERY
Payer: COMMERCIAL

## 2023-10-05 ENCOUNTER — ANESTHESIA EVENT (OUTPATIENT)
Dept: SURGERY | Facility: MEDICAL CENTER | Age: 58
End: 2023-10-05
Payer: COMMERCIAL

## 2023-10-05 ENCOUNTER — HOSPITAL ENCOUNTER (OUTPATIENT)
Facility: MEDICAL CENTER | Age: 58
End: 2023-10-05
Attending: ORTHOPAEDIC SURGERY | Admitting: ORTHOPAEDIC SURGERY
Payer: COMMERCIAL

## 2023-10-05 ENCOUNTER — ANESTHESIA (OUTPATIENT)
Dept: SURGERY | Facility: MEDICAL CENTER | Age: 58
End: 2023-10-05
Payer: COMMERCIAL

## 2023-10-05 VITALS
BODY MASS INDEX: 39.39 KG/M2 | RESPIRATION RATE: 16 BRPM | OXYGEN SATURATION: 94 % | HEIGHT: 70 IN | SYSTOLIC BLOOD PRESSURE: 128 MMHG | HEART RATE: 85 BPM | TEMPERATURE: 97.5 F | DIASTOLIC BLOOD PRESSURE: 84 MMHG | WEIGHT: 275.13 LBS

## 2023-10-05 DIAGNOSIS — G89.29 CHRONIC PAIN OF RIGHT KNEE: ICD-10-CM

## 2023-10-05 DIAGNOSIS — M25.561 CHRONIC PAIN OF RIGHT KNEE: ICD-10-CM

## 2023-10-05 LAB — GLUCOSE BLD STRIP.AUTO-MCNC: 139 MG/DL (ref 65–99)

## 2023-10-05 PROCEDURE — 97110 THERAPEUTIC EXERCISES: CPT

## 2023-10-05 PROCEDURE — 64447 NJX AA&/STRD FEMORAL NRV IMG: CPT | Performed by: ORTHOPAEDIC SURGERY

## 2023-10-05 PROCEDURE — 700105 HCHG RX REV CODE 258: Performed by: ANESTHESIOLOGY

## 2023-10-05 PROCEDURE — 160029 HCHG SURGERY MINUTES - 1ST 30 MINS LEVEL 4: Performed by: ORTHOPAEDIC SURGERY

## 2023-10-05 PROCEDURE — 97161 PT EVAL LOW COMPLEX 20 MIN: CPT

## 2023-10-05 PROCEDURE — 27447 TOTAL KNEE ARTHROPLASTY: CPT | Mod: RT | Performed by: ORTHOPAEDIC SURGERY

## 2023-10-05 PROCEDURE — 700111 HCHG RX REV CODE 636 W/ 250 OVERRIDE (IP): Performed by: ANESTHESIOLOGY

## 2023-10-05 PROCEDURE — C1776 JOINT DEVICE (IMPLANTABLE): HCPCS | Performed by: ORTHOPAEDIC SURGERY

## 2023-10-05 PROCEDURE — 700105 HCHG RX REV CODE 258: Performed by: ORTHOPAEDIC SURGERY

## 2023-10-05 PROCEDURE — 700101 HCHG RX REV CODE 250: Performed by: ANESTHESIOLOGY

## 2023-10-05 PROCEDURE — 700111 HCHG RX REV CODE 636 W/ 250 OVERRIDE (IP): Mod: JZ | Performed by: ORTHOPAEDIC SURGERY

## 2023-10-05 PROCEDURE — 160002 HCHG RECOVERY MINUTES (STAT): Performed by: ORTHOPAEDIC SURGERY

## 2023-10-05 PROCEDURE — 160036 HCHG PACU - EA ADDL 30 MINS PHASE I: Performed by: ORTHOPAEDIC SURGERY

## 2023-10-05 PROCEDURE — 700102 HCHG RX REV CODE 250 W/ 637 OVERRIDE(OP): Performed by: ANESTHESIOLOGY

## 2023-10-05 PROCEDURE — 82962 GLUCOSE BLOOD TEST: CPT

## 2023-10-05 PROCEDURE — 97116 GAIT TRAINING THERAPY: CPT

## 2023-10-05 PROCEDURE — 160025 RECOVERY II MINUTES (STATS): Performed by: ORTHOPAEDIC SURGERY

## 2023-10-05 PROCEDURE — 502240 HCHG MISC OR SUPPLY RC 0272: Performed by: ORTHOPAEDIC SURGERY

## 2023-10-05 PROCEDURE — A9270 NON-COVERED ITEM OR SERVICE: HCPCS | Performed by: ANESTHESIOLOGY

## 2023-10-05 PROCEDURE — 502000 HCHG MISC OR IMPLANTS RC 0278: Performed by: ORTHOPAEDIC SURGERY

## 2023-10-05 PROCEDURE — 160048 HCHG OR STATISTICAL LEVEL 1-5: Performed by: ORTHOPAEDIC SURGERY

## 2023-10-05 PROCEDURE — 160035 HCHG PACU - 1ST 60 MINS PHASE I: Performed by: ORTHOPAEDIC SURGERY

## 2023-10-05 PROCEDURE — 73560 X-RAY EXAM OF KNEE 1 OR 2: CPT | Mod: RT

## 2023-10-05 PROCEDURE — 160047 HCHG PACU  - EA ADDL 30 MINS PHASE II: Performed by: ORTHOPAEDIC SURGERY

## 2023-10-05 PROCEDURE — 160046 HCHG PACU - 1ST 60 MINS PHASE II: Performed by: ORTHOPAEDIC SURGERY

## 2023-10-05 PROCEDURE — 700101 HCHG RX REV CODE 250: Performed by: ORTHOPAEDIC SURGERY

## 2023-10-05 PROCEDURE — 160041 HCHG SURGERY MINUTES - EA ADDL 1 MIN LEVEL 4: Performed by: ORTHOPAEDIC SURGERY

## 2023-10-05 PROCEDURE — 700111 HCHG RX REV CODE 636 W/ 250 OVERRIDE (IP): Mod: JZ | Performed by: ANESTHESIOLOGY

## 2023-10-05 PROCEDURE — 160009 HCHG ANES TIME/MIN: Performed by: ORTHOPAEDIC SURGERY

## 2023-10-05 DEVICE — IMPLANTABLE DEVICE: Type: IMPLANTABLE DEVICE | Site: KNEE | Status: FUNCTIONAL

## 2023-10-05 RX ORDER — OXYCODONE HYDROCHLORIDE 5 MG/1
5 TABLET ORAL
Status: CANCELLED | OUTPATIENT
Start: 2023-10-05

## 2023-10-05 RX ORDER — SODIUM CHLORIDE, SODIUM LACTATE, POTASSIUM CHLORIDE, CALCIUM CHLORIDE 600; 310; 30; 20 MG/100ML; MG/100ML; MG/100ML; MG/100ML
INJECTION, SOLUTION INTRAVENOUS
Status: DISCONTINUED | OUTPATIENT
Start: 2023-10-05 | End: 2023-10-05 | Stop reason: SURG

## 2023-10-05 RX ORDER — DIPHENHYDRAMINE HYDROCHLORIDE 50 MG/ML
25 INJECTION INTRAMUSCULAR; INTRAVENOUS EVERY 6 HOURS PRN
Status: CANCELLED | OUTPATIENT
Start: 2023-10-05

## 2023-10-05 RX ORDER — HYDRALAZINE HYDROCHLORIDE 20 MG/ML
5 INJECTION INTRAMUSCULAR; INTRAVENOUS
Status: DISCONTINUED | OUTPATIENT
Start: 2023-10-05 | End: 2023-10-05 | Stop reason: HOSPADM

## 2023-10-05 RX ORDER — HYDROMORPHONE HYDROCHLORIDE 2 MG/ML
INJECTION, SOLUTION INTRAMUSCULAR; INTRAVENOUS; SUBCUTANEOUS PRN
Status: DISCONTINUED | OUTPATIENT
Start: 2023-10-05 | End: 2023-10-05 | Stop reason: SURG

## 2023-10-05 RX ORDER — BUPIVACAINE HYDROCHLORIDE 2.5 MG/ML
INJECTION, SOLUTION EPIDURAL; INFILTRATION; INTRACAUDAL PRN
Status: DISCONTINUED | OUTPATIENT
Start: 2023-10-05 | End: 2023-10-05 | Stop reason: SURG

## 2023-10-05 RX ORDER — SODIUM CHLORIDE, SODIUM LACTATE, POTASSIUM CHLORIDE, CALCIUM CHLORIDE 600; 310; 30; 20 MG/100ML; MG/100ML; MG/100ML; MG/100ML
INJECTION, SOLUTION INTRAVENOUS CONTINUOUS
Status: ACTIVE | OUTPATIENT
Start: 2023-10-05 | End: 2023-10-05

## 2023-10-05 RX ORDER — HYDROMORPHONE HYDROCHLORIDE 1 MG/ML
0.1 INJECTION, SOLUTION INTRAMUSCULAR; INTRAVENOUS; SUBCUTANEOUS
Status: DISCONTINUED | OUTPATIENT
Start: 2023-10-05 | End: 2023-10-05 | Stop reason: HOSPADM

## 2023-10-05 RX ORDER — BUPIVACAINE HYDROCHLORIDE AND EPINEPHRINE 5; 5 MG/ML; UG/ML
INJECTION, SOLUTION EPIDURAL; INTRACAUDAL; PERINEURAL
Status: DISCONTINUED | OUTPATIENT
Start: 2023-10-05 | End: 2023-10-05 | Stop reason: HOSPADM

## 2023-10-05 RX ORDER — VANCOMYCIN HYDROCHLORIDE 1 G/20ML
INJECTION, POWDER, LYOPHILIZED, FOR SOLUTION INTRAVENOUS
Status: COMPLETED | OUTPATIENT
Start: 2023-10-05 | End: 2023-10-05

## 2023-10-05 RX ORDER — HYDROMORPHONE HYDROCHLORIDE 1 MG/ML
0.4 INJECTION, SOLUTION INTRAMUSCULAR; INTRAVENOUS; SUBCUTANEOUS
Status: DISCONTINUED | OUTPATIENT
Start: 2023-10-05 | End: 2023-10-05 | Stop reason: HOSPADM

## 2023-10-05 RX ORDER — CEFAZOLIN SODIUM 1 G/3ML
INJECTION, POWDER, FOR SOLUTION INTRAMUSCULAR; INTRAVENOUS PRN
Status: DISCONTINUED | OUTPATIENT
Start: 2023-10-05 | End: 2023-10-05 | Stop reason: SURG

## 2023-10-05 RX ORDER — OXYCODONE HYDROCHLORIDE 10 MG/1
10 TABLET ORAL
Status: CANCELLED | OUTPATIENT
Start: 2023-10-05

## 2023-10-05 RX ORDER — DEXAMETHASONE SODIUM PHOSPHATE 4 MG/ML
INJECTION, SOLUTION INTRA-ARTICULAR; INTRALESIONAL; INTRAMUSCULAR; INTRAVENOUS; SOFT TISSUE PRN
Status: DISCONTINUED | OUTPATIENT
Start: 2023-10-05 | End: 2023-10-05 | Stop reason: SURG

## 2023-10-05 RX ORDER — OXYCODONE HCL 5 MG/5 ML
10 SOLUTION, ORAL ORAL
Status: COMPLETED | OUTPATIENT
Start: 2023-10-05 | End: 2023-10-05

## 2023-10-05 RX ORDER — DOCUSATE SODIUM 100 MG/1
100 CAPSULE, LIQUID FILLED ORAL 2 TIMES DAILY
Status: CANCELLED | OUTPATIENT
Start: 2023-10-05

## 2023-10-05 RX ORDER — HALOPERIDOL 5 MG/ML
1 INJECTION INTRAMUSCULAR EVERY 6 HOURS PRN
Status: CANCELLED | OUTPATIENT
Start: 2023-10-05

## 2023-10-05 RX ORDER — TRANEXAMIC ACID 100 MG/ML
INJECTION, SOLUTION INTRAVENOUS PRN
Status: DISCONTINUED | OUTPATIENT
Start: 2023-10-05 | End: 2023-10-05 | Stop reason: SURG

## 2023-10-05 RX ORDER — MEPERIDINE HYDROCHLORIDE 25 MG/ML
12.5 INJECTION INTRAMUSCULAR; INTRAVENOUS; SUBCUTANEOUS
Status: DISCONTINUED | OUTPATIENT
Start: 2023-10-05 | End: 2023-10-05 | Stop reason: HOSPADM

## 2023-10-05 RX ORDER — HYDROMORPHONE HYDROCHLORIDE 1 MG/ML
0.5 INJECTION, SOLUTION INTRAMUSCULAR; INTRAVENOUS; SUBCUTANEOUS
Status: CANCELLED | OUTPATIENT
Start: 2023-10-05

## 2023-10-05 RX ORDER — LABETALOL HYDROCHLORIDE 5 MG/ML
5 INJECTION, SOLUTION INTRAVENOUS
Status: DISCONTINUED | OUTPATIENT
Start: 2023-10-05 | End: 2023-10-05 | Stop reason: HOSPADM

## 2023-10-05 RX ORDER — MIDAZOLAM HYDROCHLORIDE 1 MG/ML
INJECTION INTRAMUSCULAR; INTRAVENOUS PRN
Status: DISCONTINUED | OUTPATIENT
Start: 2023-10-05 | End: 2023-10-05 | Stop reason: SURG

## 2023-10-05 RX ORDER — HYDROMORPHONE HYDROCHLORIDE 1 MG/ML
0.2 INJECTION, SOLUTION INTRAMUSCULAR; INTRAVENOUS; SUBCUTANEOUS
Status: DISCONTINUED | OUTPATIENT
Start: 2023-10-05 | End: 2023-10-05 | Stop reason: HOSPADM

## 2023-10-05 RX ORDER — CEFAZOLIN SODIUM 1 G/3ML
3 INJECTION, POWDER, FOR SOLUTION INTRAMUSCULAR; INTRAVENOUS ONCE
Status: DISCONTINUED | OUTPATIENT
Start: 2023-10-05 | End: 2023-10-05 | Stop reason: HOSPADM

## 2023-10-05 RX ORDER — ONDANSETRON 2 MG/ML
4 INJECTION INTRAMUSCULAR; INTRAVENOUS EVERY 4 HOURS PRN
Status: CANCELLED | OUTPATIENT
Start: 2023-10-05

## 2023-10-05 RX ORDER — ASPIRIN 81 MG/1
81 TABLET ORAL 2 TIMES DAILY
Status: CANCELLED | OUTPATIENT
Start: 2023-10-06

## 2023-10-05 RX ORDER — ENEMA 19; 7 G/133ML; G/133ML
1 ENEMA RECTAL
Status: CANCELLED | OUTPATIENT
Start: 2023-10-05

## 2023-10-05 RX ORDER — ONDANSETRON 2 MG/ML
INJECTION INTRAMUSCULAR; INTRAVENOUS PRN
Status: DISCONTINUED | OUTPATIENT
Start: 2023-10-05 | End: 2023-10-05 | Stop reason: SURG

## 2023-10-05 RX ORDER — AMOXICILLIN 250 MG
1 CAPSULE ORAL
Status: CANCELLED | OUTPATIENT
Start: 2023-10-05

## 2023-10-05 RX ORDER — HALOPERIDOL 5 MG/ML
1 INJECTION INTRAMUSCULAR
Status: DISCONTINUED | OUTPATIENT
Start: 2023-10-05 | End: 2023-10-05 | Stop reason: HOSPADM

## 2023-10-05 RX ORDER — DEXAMETHASONE SODIUM PHOSPHATE 4 MG/ML
4 INJECTION, SOLUTION INTRA-ARTICULAR; INTRALESIONAL; INTRAMUSCULAR; INTRAVENOUS; SOFT TISSUE
Status: CANCELLED | OUTPATIENT
Start: 2023-10-05

## 2023-10-05 RX ORDER — OXYCODONE HCL 5 MG/5 ML
5 SOLUTION, ORAL ORAL
Status: COMPLETED | OUTPATIENT
Start: 2023-10-05 | End: 2023-10-05

## 2023-10-05 RX ORDER — SCOLOPAMINE TRANSDERMAL SYSTEM 1 MG/1
1 PATCH, EXTENDED RELEASE TRANSDERMAL
Status: CANCELLED | OUTPATIENT
Start: 2023-10-05

## 2023-10-05 RX ORDER — POLYETHYLENE GLYCOL 3350 17 G/17G
1 POWDER, FOR SOLUTION ORAL 2 TIMES DAILY PRN
Status: CANCELLED | OUTPATIENT
Start: 2023-10-05

## 2023-10-05 RX ORDER — AMOXICILLIN 250 MG
1 CAPSULE ORAL NIGHTLY
Status: CANCELLED | OUTPATIENT
Start: 2023-10-05

## 2023-10-05 RX ORDER — BISACODYL 10 MG
10 SUPPOSITORY, RECTAL RECTAL
Status: CANCELLED | OUTPATIENT
Start: 2023-10-05

## 2023-10-05 RX ORDER — ONDANSETRON 2 MG/ML
4 INJECTION INTRAMUSCULAR; INTRAVENOUS
Status: DISCONTINUED | OUTPATIENT
Start: 2023-10-05 | End: 2023-10-05 | Stop reason: HOSPADM

## 2023-10-05 RX ORDER — SODIUM CHLORIDE, SODIUM LACTATE, POTASSIUM CHLORIDE, CALCIUM CHLORIDE 600; 310; 30; 20 MG/100ML; MG/100ML; MG/100ML; MG/100ML
INJECTION, SOLUTION INTRAVENOUS CONTINUOUS
Status: DISCONTINUED | OUTPATIENT
Start: 2023-10-05 | End: 2023-10-05 | Stop reason: HOSPADM

## 2023-10-05 RX ADMIN — HYDROMORPHONE HYDROCHLORIDE 0.4 MG: 1 INJECTION, SOLUTION INTRAMUSCULAR; INTRAVENOUS; SUBCUTANEOUS at 15:00

## 2023-10-05 RX ADMIN — HYDROMORPHONE HYDROCHLORIDE 0.2 MG: 1 INJECTION, SOLUTION INTRAMUSCULAR; INTRAVENOUS; SUBCUTANEOUS at 15:15

## 2023-10-05 RX ADMIN — OXYCODONE HYDROCHLORIDE 10 MG: 5 SOLUTION ORAL at 14:28

## 2023-10-05 RX ADMIN — CEFAZOLIN 3 G: 1 INJECTION, POWDER, FOR SOLUTION INTRAMUSCULAR; INTRAVENOUS at 13:02

## 2023-10-05 RX ADMIN — HYDROMORPHONE HYDROCHLORIDE 0.5 MG: 2 INJECTION INTRAMUSCULAR; INTRAVENOUS; SUBCUTANEOUS at 13:43

## 2023-10-05 RX ADMIN — SODIUM CHLORIDE, POTASSIUM CHLORIDE, SODIUM LACTATE AND CALCIUM CHLORIDE: 600; 310; 30; 20 INJECTION, SOLUTION INTRAVENOUS at 12:58

## 2023-10-05 RX ADMIN — SODIUM CHLORIDE, POTASSIUM CHLORIDE, SODIUM LACTATE AND CALCIUM CHLORIDE: 600; 310; 30; 20 INJECTION, SOLUTION INTRAVENOUS at 11:23

## 2023-10-05 RX ADMIN — ONDANSETRON 4 MG: 2 INJECTION INTRAMUSCULAR; INTRAVENOUS at 13:50

## 2023-10-05 RX ADMIN — HYDROMORPHONE HYDROCHLORIDE 0.4 MG: 1 INJECTION, SOLUTION INTRAMUSCULAR; INTRAVENOUS; SUBCUTANEOUS at 14:55

## 2023-10-05 RX ADMIN — FENTANYL CITRATE 50 MCG: 50 INJECTION, SOLUTION INTRAMUSCULAR; INTRAVENOUS at 14:28

## 2023-10-05 RX ADMIN — BUPIVACAINE HYDROCHLORIDE 20 ML: 2.5 INJECTION, SOLUTION EPIDURAL; INFILTRATION; INTRACAUDAL at 12:37

## 2023-10-05 RX ADMIN — DEXAMETHASONE SODIUM PHOSPHATE 8 MG: 4 INJECTION INTRA-ARTICULAR; INTRALESIONAL; INTRAMUSCULAR; INTRAVENOUS; SOFT TISSUE at 13:02

## 2023-10-05 RX ADMIN — PROPOFOL 200 MG: 10 INJECTION, EMULSION INTRAVENOUS at 13:02

## 2023-10-05 RX ADMIN — FENTANYL CITRATE 50 MCG: 50 INJECTION, SOLUTION INTRAMUSCULAR; INTRAVENOUS at 14:35

## 2023-10-05 RX ADMIN — MIDAZOLAM 2 MG: 1 INJECTION, SOLUTION INTRAMUSCULAR; INTRAVENOUS at 12:37

## 2023-10-05 RX ADMIN — TRANEXAMIC ACID 1000 MG: 100 INJECTION, SOLUTION INTRAVENOUS at 13:02

## 2023-10-05 RX ADMIN — HYDROMORPHONE HYDROCHLORIDE 0.5 MG: 2 INJECTION INTRAMUSCULAR; INTRAVENOUS; SUBCUTANEOUS at 13:47

## 2023-10-05 RX ADMIN — HYDROMORPHONE HYDROCHLORIDE 0.5 MG: 2 INJECTION INTRAMUSCULAR; INTRAVENOUS; SUBCUTANEOUS at 13:30

## 2023-10-05 RX ADMIN — HYDROMORPHONE HYDROCHLORIDE 0.4 MG: 1 INJECTION, SOLUTION INTRAMUSCULAR; INTRAVENOUS; SUBCUTANEOUS at 14:43

## 2023-10-05 RX ADMIN — HYDROMORPHONE HYDROCHLORIDE 0.4 MG: 1 INJECTION, SOLUTION INTRAMUSCULAR; INTRAVENOUS; SUBCUTANEOUS at 14:37

## 2023-10-05 ASSESSMENT — PAIN DESCRIPTION - PAIN TYPE
TYPE: SURGICAL PAIN
TYPE: ACUTE PAIN
TYPE: SURGICAL PAIN

## 2023-10-05 ASSESSMENT — COGNITIVE AND FUNCTIONAL STATUS - GENERAL
SUGGESTED CMS G CODE MODIFIER MOBILITY: CI
MOBILITY SCORE: 23
CLIMB 3 TO 5 STEPS WITH RAILING: A LITTLE

## 2023-10-05 ASSESSMENT — GAIT ASSESSMENTS
DEVIATION: ANTALGIC
ASSISTIVE DEVICE: FRONT WHEEL WALKER
DISTANCE (FEET): 150
GAIT LEVEL OF ASSIST: SUPERVISED

## 2023-10-05 ASSESSMENT — FIBROSIS 4 INDEX: FIB4 SCORE: 0.88

## 2023-10-05 NOTE — ANESTHESIA POSTPROCEDURE EVALUATION
Patient: Braden Cruz    Procedure Summary     Date: 10/05/23 Room / Location:  OR 06 / SURGERY [facility]    Anesthesia Start: 1258 Anesthesia Stop: 1412    Procedure: ARTHROPLASTY, KNEE, TOTAL, RIGHT (Right: Knee) Diagnosis:       Chronic pain of right knee      Tricompartment osteoarthritis of right knee      (Chronic pain of right knee [M25.561, G89.29] Tricompartment osteoarthritis of right knee [M17.11])    Surgeons: Vinayak Kuhn M.D. Responsible Provider: Tobey Gansert, M.D.    Anesthesia Type: general, peripheral nerve block ASA Status: 3          Final Anesthesia Type: general, peripheral nerve block  Last vitals  BP   Blood Pressure: 110/78    Temp   36.9 °C (98.5 °F)    Pulse   99   Resp   16    SpO2   88 %      Anesthesia Post Evaluation    Patient location during evaluation: PACU  Patient participation: complete - patient participated  Level of consciousness: awake and alert    Airway patency: patent  Anesthetic complications: no  Cardiovascular status: hemodynamically stable  Respiratory status: acceptable  Hydration status: euvolemic    PONV: none          There were no known notable events for this encounter.     Nurse Pain Score: 6 (NPRS)         Visit Information Date & Time Provider Department Dept. Phone Encounter #  
 5/15/2017  3:30 PM Gregg Cameron  [facility], [address] [facility] [phone] [id number] Follow-up Instructions Return in about 6 weeks (around 6/26/2017) for med f/u, adjustment. Your Appointments 7/5/2017  3:10 PM  
Follow Up with Gregg Cameron MD  
[facility] ([facility]) Appt Note: 6WK MEDS/BLOCK FU; Rejeana Safer 6/13/17  
 [address] [facility] [address]  
[phone]  
  
   
 [address] [facility] [address] Upcoming Health Maintenance Date Due DTaP/Tdap/Td series (1 - Tdap) 7/27/1979 FOBT Q 1 YEAR AGE 50-75 7/27/2008 INFLUENZA AGE 9 TO ADULT 8/1/2017 Allergies as of 5/15/2017  Review Complete On: 5/15/2017 By: Gregg Cameron MD  
  
 Severity Noted Reaction Type Reactions Percocet [Oxycodone-acetaminophen]  07/05/2016    Itching Current Immunizations  Reviewed on 5/29/2016 Name Date Influenza Vaccine (Quad) PF 2/3/2016 Not reviewed this visit You Were Diagnosed With   
  
 Codes Comments Lumbar stenosis with neurogenic claudication    -  Primary ICD-10-CM: M48.06 
ICD-9-CM: 724.03 Neck pain     ICD-10-CM: M54.2 ICD-9-CM: 723.1 S/P cervical spinal fusion     ICD-10-CM: Z98.1 ICD-9-CM: V45.4 Vitals BP Pulse Temp Resp Height(growth percentile) Weight(growth percentile) 143/86 97 98.8 °F (37.1 °C) (Oral) 18 5' 6\" (1.676 m) 160 lb (72.6 kg) BMI Smoking Status 25.82 kg/m2 Former Smoker BMI and BSA Data Body Mass Index Body Surface Area  
 25.82 kg/m 2 1.84 m 2 Preferred Pharmacy Pharmacy Name Phone [id number] [address] [phone] Your Updated Medication List  
  
   
This list is accurate as of: 5/15/17  4:29 PM.  Always use your most recent med list.  
  
  
  
  
 cyclobenzaprine 10 mg tablet Commonly known as:  FLEXERIL  
10 mg. DULoxetine 30 mg capsule Commonly known as:  CYMBALTA Take 1 Cap by mouth daily (with dinner). HYDROcodone-acetaminophen 5-500 mg per tablet Commonly known as:  Ale Narrow Take 1 Tab by Mouth Every 4 Hours As Needed for Pain. Do not drink or drive with this medication. [facility]#[id number]  
  
 ibuprofen 600 mg tablet Commonly known as:  MOTRIN Take 1 Tab by mouth every eight (8) hours as needed for Pain. lisinopril-hydroCHLOROthiazide 20-25 mg per tablet Commonly known as:  Sourav Stank Take 1 Tab by mouth daily. oxyCODONE IR 5 mg immediate release tablet Commonly known as:  Dawit Pronto Take 1 tab by mouth 1-2 times a day as needed  for pain. Prescriptions Printed Refills  
 oxyCODONE IR (ROXICODONE) 5 mg immediate release tablet 0 Sig: Take 1 tab by mouth 1-2 times a day as needed  for pain. Class: Print Prescriptions Sent to Pharmacy Refills DULoxetine (CYMBALTA) 30 mg capsule 2 Sig: Take 1 Cap by mouth daily (with dinner). Class: Normal  
 Pharmacy: [place] [id number].  #: [phone] Route: Oral  
  
We Performed the Following AMB POC XRAY, SPINE, CERVICAL; 2 OR 3 [15262 CPT(R)] Follow-up Instructions Return in about 6 weeks (around 6/26/2017) for med f/u, adjustment. Patient Instructions Deciding About Surgery for Lumbar Spinal Stenosis What is lumbar spinal stenosis? Lumbar spinal stenosis is the narrowing of the spinal canal in the lower back. This occurs when bone and other tissues grow inside the openings in the spinal bones. This can squeeze the nerves that branch out from the spinal cord. The squeezing can cause pain, numbness, or weakness. It happens most often in the legs, feet, or buttocks. You may choose to have surgery to ease your symptoms. Or you may try other treatments instead. These include medicines, exercise, and physical therapy. What are key points about this decision? · If your symptoms are mild to moderate, then medicine, physical therapy, and exercise may be all you need. · You may want surgery if you have tried other treatment for a while and your symptoms are still so bad that you can't do your normal activities. · Your symptoms may come back after a few years. You may need surgery again. · Surgery will most likely help leg pain. But it may not help back pain as much. Why might you choose to have surgery? · Surgery can relieve pain. It can also improve how well you can walk. · You have tried other treatment for a while and your symptoms are still so bad that you can't do your normal activities. · Without surgery, your symptoms may still bother you. If symptoms are very painful, they most often will not improve on their own. · Your doctor may advise surgery if you can't control your bladder or bowels as well as you used to. Surgery is also an option if you notice sudden changes in how well you can walk or you are more clumsy than before. Why might you choose not to have surgery? · You may try other treatments to help your symptoms. These include medicine, exercise, and physical therapy. These other treatments work best for people with mild to moderate symptoms. · Risks from surgery include nerve injury and tissue tears. And you could have chronic pain, trouble passing urine, and a spine that is not stable. · All surgery has some risks. These include bleeding, infection, and risks from anesthesia. · You may not be able to return to all of your normal activities for many months. · Your symptoms may come back in a few years. You may need surgery again. Your decision Thinking about the facts and your feelings can help you make a decision that is right for you. Be sure you understand the benefits and risks of your options, and think about what else you need to do before you make the decision. Where can you learn more? Go to http://zulma-susana.info/. Enter D901 in the search box to learn more about \"Deciding About Surgery for Lumbar Spinal Stenosis. \" Current as of: May 23, 2016 Content Version: 11.2 © 0320-0173 [employer]. Care instructions adapted under license by [employer] (which disclaims liability or warranty for this information). If you have questions about a medical condition or this instruction, always ask your healthcare professional. [address] any warranty or liability for your use of this information. Introducing [facility]! Daniel Magaña introduces [employer] patient portal. Now you can access parts of your medical record, email your doctor's office, and request medication refills online. 1. In your internet browser, go to [email or web address] 2. Click on the First Time User? Click Here link in the Sign In box. You will see the New Member Sign Up page. 3. Enter your [employer] Access Code exactly as it appears below. You will not need to use this code after youve completed the sign-up process. If you do not sign up before the expiration date, you must request a new code. · [employer] Access Code: [email or web address] Expires: 7/5/2017  8:20 PM 
 
4. Enter the last four digits of your Social Security Number (xxxx) and Date of Birth (mm/dd/yyyy) as indicated and click Submit. You will be taken to the next sign-up page. 5. Create a Int[employer]Video ID. This will be your [employer] login ID and cannot be changed, so think of one that is secure and easy to remember. 6. Create a [employer] password. You can change your password at any time. 7. Enter your Password Reset Question and Answer. This can be used at a later time if you forget your password. 8. Enter your e-mail address. You will receive e-mail notification when new information is available in [address] 9. Click Sign Up. You can now view and download portions of your medical record. 10. Click the Download Summary menu link to download a portable copy of your medical information. If you have questions, please visit the Frequently Asked Questions section of the [employer] website. Remember, [employer] is NOT to be used for urgent needs. For medical emergencies, dial 911. Now available from your iPhone and Android! Please provide this summary of care documentation to your next provider. If you have any questions after today's visit, please call [phone].

## 2023-10-05 NOTE — ANESTHESIA PROCEDURE NOTES
Peripheral Block    Date/Time: 10/5/2023 12:40 PM    Performed by: Tobey Gansert, M.D.  Authorized by: Tobey Gansert, M.D.    Start Time:  10/5/2023 12:40 PM  End Time:  10/5/2023 12:47 PM  Reason for Block: at surgeon's request and post-op pain management ONLY    patient identified, IV checked, site marked, risks and benefits discussed, surgical consent, monitors and equipment checked, pre-op evaluation and timeout performed    Patient Position:  Supine  Prep: ChloraPrep    Monitoring:  Heart rate, continuous pulse ox and cardiac monitor  Block Region:  Lower Extremity  Lower Extremity - Block Type:  Selective FEMORAL nerve block at the Adductor Canal    Laterality:  Right  Procedures: ultrasound guided  Image captured, interpreted and electronically stored.  Local Infiltration:  Lidocaine  Strength:  1 %  Dose:  3 ml  Block Type:  Single-shot  Needle Length:  100mm  Needle Gauge:  21 G  Needle Localization:  Ultrasound guidance  Injection Assessment:  Negative aspiration for heme, no paresthesia on injection, incremental injection and local visualized surrounding nerve on ultrasound  Evidence of intravascular injection: No     US Guided Selective Femoral Nerve Block at Adductor Canal:   US probe placed at mid-thigh level on externally rotated leg and femur identified.  Probe directed medially until Sartorius Muscle (SM), Femoral Artery (FA) and Saphenous Nerve (SN) identified in Adductor Canal (AC).  Needle inserted anterolateral to probe in an in plane approach into a subsartorial perivascular perineural position.  After negative aspiration LA injected with ease and visualized spreading within the AC.

## 2023-10-05 NOTE — LETTER
May 24, 2023    Patient Name: Braden Cruz  Surgeon Name: Vinayak Kuhn M.D.  Surgery Facility: [facility] ([address] [facility])  Surgery Date: 10/5/2023    The time of your surgery is not final and may change up to and until the day of your surgery. You will be contacted 24-48 hours prior to your surgery date with your check-in and surgery time.    If you will not be at one of the below numbers please call the surgery scheduler at [phone]  Preferred Phone: [phone]    BEFORE YOUR SURGERY   Pre Registration and/or Lab Work must be done within and no earlier than 28 days prior to your surgery date. Please call [facility] at [phone] for an appointment as soon as possible.    Pre op Appointment:   Date: 09/27/23   Time: 11:30AM   Provider: Vinayak Kuhn MD   Location: [address] [place] [address]  Instructions: Bring a list of all medications you are taking including the dosing and frequency.    DAY OF YOUR SURGERY  Nothing to eat or drink after midnight     Refrain from smoking any substance after midnight prior to surgery. Smoking may interfere with the anesthetic and frequently produces nausea during the recovery period.    Continue taking all lifesaving medications. Including the morning of your surgery with small sip of water.    Please do NOT take on the day of surgery:  Diuretics: examples- furosemide (Lasix), spironolactone, hydrochlorothiazide  ACE-inhibitors: examples- lisinopril, ramipril, enalapril  “ARBs”: examples- losartan, Olmesartan, valsartan    Please arrive at the hospital/surgery center at the check-in time provided.     An adult will need to bring you and take you home after your surgery.           AFTER YOUR SURGERY  Post op Appointment:   Date: 10/17/23   Time: 9:15AM   With: Vinayak Kuhn MD   Location: [address] [place] [address]    - Therapy- Your first appointment should be 3  day(s)  after your surgery. For your convenience we have 4 Physical Therapy locations: [facility], [place], and [facility]. Call our office ASAP to schedule an appointment at [phone] or take the enclosed Therapy Prescription to a facility of your choice.  - Post Surgery - You will need someone to drive you home  - Post Surgery - You will need someone to stay with you for 24 hours    TIME OFF WORK  FMLA or Disability forms can be faxed directly to: [phone] or you may drop them off at [address] [place] [address]. Our office charges a $35.00 fee per form. Forms will be completed within 10 business days of drop off and payment received. For the status of your forms you may contact our disability office directly [phone].    MEDICATION INSTRUCTIONS **Please read section completely**    The following medications should be stopped a minimum of 10 days prior to surgery:  All over the counter, Supplements & Herbal medications    Anorectics: Phentermine (Adipex-P, Lomaira and Suprenza), Phentermine-topiramate (Qsymia), Bupropion-naltrexone (Contrave)    Opiod Partial Agonists/Opioid Antagonists: Buprenorphine (Subocone, Belbuca, Butrans, Probuphine Implant, Sublocade), Naltrexone (ReVia, Vivitrol), Naloxone    Amphetamines: Dextroamphetamine/Amphetamine (Adderall, Mydayis), Methylphenidate Hydrochloride (Concerta, Metadate, Methylin, Ritalin)    The following medications should be stopped 5 days prior to surgery:  Blood Thinners: Any Aspirin, Aspirin products, anti-inflammatories such as ibuprofen and any blood thinners such as Coumadin and Plavix. Please consult your prescribing physician if you are on life saving blood thinners, in regards to when to stop medications prior to surgery.     The following medications should be stopped a minimum of 3 days prior to surgery:  PDE-5 inhibitors: Sildenafil (Viagra), Tadalafil (Cialis), Vardenafil (Levitra), Avanafil (Stendra)    MAO Inhibitors:  Rasagiline (Azilect), Selegiline (Eldepryl, Emsam, Selapar), Isocarboxazid (Marplan), Phenelzine (Nardil)         COVID and INFLUENZA NOTICE TO PATIENTS    Currently, the [facility] does not routinely test patients for COVID-19 or Influenza prior to their elective surgery.  However, if patients develop the following symptoms prior to their surgery date, they should voluntarily test for COVID-19 and Influenza and notify the surgical office of their condition and results.  The symptoms warranting testing would be any two of the following:    Fever (Temp above 100.4 F)  Chills  Cough  Shortness of breath or difficulty breathing  Fatigue  Myalgias (muscle or body aches)  Headache  Sore Throat  Congestion or Runny Nose  Nausea or vomiting  Diarrhea  New loss of taste or smell    Having these symptoms prior to surgery can significantly increase your risk of morbidity and mortality under anesthesia, which may compromise your health and require a transfer to a hospital for a higher level of care.  Therefore, it is advisable to notify the surgical team of any illness in order to get information for the appropriate time to delay the surgery to minimize these preventable risks.    Your health and safety are our number one priority at the [facility], and we are thankful that you entrust us with your care.  Please help us ensure the best possible surgical and anesthetic outcome by sharing appropriate health information with our perioperative team and staff.  We look forward to taking great care of you!    Thank you for your time and consideration on this matter.    Christian Kurtz MD  Medical Director  Anesthesiologist  [facility]

## 2023-10-05 NOTE — OR NURSING
1004: Brought patient back to pre-op and assumed care.   1201: Report given to HENRI Gu and she assumed care. Patient allergies and NPO status verified, home medication reconciliation completed and belongings secured. Patient verbalizes understanding of pain scale, expected course of stay and plan of care. Surgical site verified with patient. IV access established. Sequentials placed on legs.    Called patient to schedule appointments (lab and CPE). No answer, left voicemail.

## 2023-10-05 NOTE — LETTER
September 19, 2023    Patient Name: Braden Cruz  Surgeon Name: Vinayak Kuhn M.D.  Surgery Facility: [facility] ([address] [facility])  Surgery Date: 10/5/2023    The time of your surgery is not final and may change up to and until the day of your surgery. You will be contacted 24-48 hours prior to your surgery date with your check-in and surgery time.    If you will not be at one of the below numbers please call the surgery scheduler at [phone]  Preferred Phone: [phone]    BEFORE YOUR SURGERY   Pre Registration and/or Lab Work must be done within and no earlier than 28 days prior to your surgery date. Please call [facility] at [phone] for an appointment as soon as possible.    Pre op Appointment:   Date: 09/27/23   Time: 11:30AM   Provider: Vinayak Kuhn MD   Location: [address] [place] [address]  Instructions: Bring a list of all medications you are taking including the dosing and frequency.    DAY OF YOUR SURGERY  Nothing to eat or drink after midnight     Refrain from smoking any substance after midnight prior to surgery. Smoking may interfere with the anesthetic and frequently produces nausea during the recovery period.    Continue taking all lifesaving medications. Including the morning of your surgery with small sip of water.    Please do NOT take on the day of surgery:  Diuretics: examples- furosemide (Lasix), spironolactone, hydrochlorothiazide  ACE-inhibitors: examples- lisinopril, ramipril, enalapril  “ARBs”: examples- losartan, Olmesartan, valsartan    Please arrive at the hospital/surgery center at the check-in time provided.     An adult will need to bring you and take you home after your surgery.           AFTER YOUR SURGERY  Post op Appointment:   Date: 10/11/23   Time: 9:15AM   With: Vinayak Kuhn MD   Location: [address] [place] [address]    - Therapy- Your first appointment should be 3   day(s) after your surgery. For your convenience we have 4 Physical Therapy locations: [facility], [place], and [facility]. Call our office ASAP to schedule an appointment at [phone] or take the enclosed Therapy Prescription to a facility of your choice.  - Post Surgery - You will need someone to drive you home  - Post Surgery - You will need someone to stay with you for 24 hours    TIME OFF WORK  FMLA or Disability forms can be faxed directly to: [phone] or you may drop them off at [address] [place] [address]. Our office charges a $35.00 fee per form. Forms will be completed within 10 business days of drop off and payment received. For the status of your forms you may contact our disability office directly [phone].    MEDICATION INSTRUCTIONS **Please read section completely**    The following medications should be stopped a minimum of 10 days prior to surgery:  All over the counter, Supplements & Herbal medications    Anorectics: Phentermine (Adipex-P, Lomaira and Suprenza), Phentermine-topiramate (Qsymia), Bupropion-naltrexone (Contrave)    Opiod Partial Agonists/Opioid Antagonists: Buprenorphine (Subocone, Belbuca, Butrans, Probuphine Implant, Sublocade), Naltrexone (ReVia, Vivitrol), Naloxone    Amphetamines: Dextroamphetamine/Amphetamine (Adderall, Mydayis), Methylphenidate Hydrochloride (Concerta, Metadate, Methylin, Ritalin)    The following medications should be stopped 5 days prior to surgery:  Blood Thinners: Any Aspirin, Aspirin products, anti-inflammatories such as ibuprofen and any blood thinners such as Coumadin and Plavix. Please consult your prescribing physician if you are on life saving blood thinners, in regards to when to stop medications prior to surgery.     The following medications should be stopped a minimum of 3 days prior to surgery:  PDE-5 inhibitors: Sildenafil (Viagra), Tadalafil (Cialis), Vardenafil (Levitra), Avanafil (Stendra)    MAO  Inhibitors: Rasagiline (Azilect), Selegiline (Eldepryl, Emsam, Selapar), Isocarboxazid (Marplan), Phenelzine (Nardil)

## 2023-10-05 NOTE — ANESTHESIA PREPROCEDURE EVALUATION
Case: [id number] Date/Time: 10/05/23 1145    Procedure: ARTHROPLASTY, KNEE, TOTAL, RIGHT (Right: Knee)    Anesthesia type: General    Diagnosis:       Chronic pain of right knee [M25.561, G89.29]      Tricompartment osteoarthritis of right knee [M17.11]    Pre-op diagnosis: Chronic pain of right knee [M25.561, G89.29] Tricompartment osteoarthritis of right knee [M17.11]    Location:  OR 06 / SURGERY [facility]    Surgeons: Vinayak Kuhn M.D.          Relevant Problems   CARDIAC   (positive) Essential hypertension      ENDO   (positive) Type 2 diabetes mellitus without complication (HCC)      Other   (positive) Chronic pain of right knee       Physical Exam    Airway   Mallampati: II  TM distance: >3 FB  Neck ROM: full       Cardiovascular - normal exam  Rhythm: regular  Rate: normal  (-) murmur     Dental - normal exam           Pulmonary - normal exam  Breath sounds clear to auscultation     Abdominal    Neurological - normal exam                 Anesthesia Plan    ASA 3   ASA physical status 3 criteria: morbid obesity - BMI greater than or equal to 40    Plan - general and peripheral nerve block     Peripheral nerve block will be post-op pain control  Airway plan will be LMA          Induction: intravenous    Postoperative Plan: Postoperative administration of opioids is intended.    Pertinent diagnostic labs and testing reviewed    Informed Consent:    Anesthetic plan and risks discussed with patient.    Use of blood products discussed with: patient whom consented to blood products.

## 2023-10-05 NOTE — OP REPORT
DATE OF SERVICE: 10/5/23    PREOPERATIVE DIAGNOSES:  1. right knee advanced tricompartmental arthritis.     POSTOPERATIVE DIAGNOSES:  1. right knee advanced tricompartmental arthritis.     PROCEDURE PERFORMED: right total knee arthroplasty    SURGEON: Vinayak Kuhn MD.     ASSISTANT: Vanessa GUERRERO    ANESTHESIA: General with Adductor canal femoral nerve block for postoperative pain control.     ANESTHESIOLOGIST: Gansert, MD    ANTIBIOTICS: Ancef     IMPLANTS: [place] Triathlon system, size 5 femur, 5 tibial baseplate, 9 PS poly, and 36 patellar button with Simplex cement.     COMPLICATIONS: None    BLOOD LOSS: 50    INDICATIONS: The patient presents with a chief complaint of knee pain recalcitrant to conservative treatment. The patient has advanced knee arthritis. The risks of the surgery were discussed at length. All questions were answered, and no guarantees given. The patient elected to proceed with the proposed procedure.     DESCRIPTION OF PROCEDURE: The patient was properly identified in the preoperative holding room and the right knee was marked as the correct surgical site. The patient was taken to the operative suite and placed in supine on the operative table. The patient underwent general anesthesia. After review of allergies, antibiotics were administered, a time out was called. The right knee and lower extremity was sterilely prepped and draped in standard fashion. The limb was exsanguinated and tourniquet was inflated to 250mmHG. A standard midline incision was made followed by medial patellar arthrotomy. The medial and lateral meniscus were removed as well as the ACL and the PCL.  There was significant tricompartmental arthritis. The knee was placed in 90 degrees of flexion. A drill hole was made on the distal femur. The distal cut was made.  Anterior referencing measured size 5. This was drilled in 3 degrees of external rotation and was followed by a 4-in-1 cutting block and the notch block  followed by intramedullary tibial guide after checking appropriate rotations, slope and height. The tibia was drilled and pinned in place. The proximal tibia cut was performed. Appropriate soft tissue balance at 0 - 30 degrees. A size 5  tibial base had good coverage without overhang, was repaired and trialed, brought in extension with a 9 poly liner. The patella was measured with a caliper, a saw cut was made, drilled for a 36 button. The femoral punch and tibial punch was used. The trial implants were removed. The implants were pressfit with excellent bone fixation, first the tibia, then the femur, brought in extension with # 9 poly and the the patella. This was soaked with betadine and saline and the tourniquet was deflated. Good hemostasis was obtained. Once again, retrialed and the final 9 PS poly was used, irrigated and then closed in flexion with with # 2 Quill, reinforced with # 1 Vicryl, 2-0 Vicryl and staples on skin. All counts were correct. YOLANDA Schofield, was present throughout the operation and key essential part of the success of the operation assisting with patient positioning, instrumentation, retraction, and closure.

## 2023-10-05 NOTE — OR NURSING
1411: Patient arrived from OR via gurney.  R knee dressing CDI; pedal pulse +2, cap refill <3 secs. Cold pack applied to R knee.   Start Stop Bang per protocol for a score of 6.  Sedation/Resp Status: Non responsive to verbal with OPA in place. Respirations spontaneous and non-labored.    1415: No change in surgical site assessment. Continues to sleep with OPA in place.     1425: OPA removed. Pt states he is having pain 10/10, denies nausea.     1430: Pt continues to have pain, medicated. Dr Kuhn at bedside checking on pt.     1445: Pt continues to have a lot of pain, medicated. O2 sat in low 80's 3L NC applied.     1500: Pt states pain slightly improving 8/10, denies nausea. Sipping on juice. No change in surgical site assessment.     1515: Resting with eyes closed. Currently on 1 L NC , O2 sat 90%.  Awaiting for X-ray. Wife updated of pt status. Pt's pain down 7/10, no nausea.     1525: Report to HENRI Gu     1605: Report from HENRI Gu.   Getting pt up for mobility test.Ambulating to stage 2, report to HENRI Echols. PT to see pt.

## 2023-10-05 NOTE — DISCHARGE INSTRUCTIONS
If any questions arise, call your provider.  If your provider is not available, please feel free to call the Surgical Center at [phone].    MEDICATIONS: Resume taking daily medication.  Take prescribed pain medication with food.  If no medication is prescribed, you may take non-aspirin pain medication if needed.  PAIN MEDICATION CAN BE VERY CONSTIPATING.  Take a stool softener or laxative such as senokot, pericolace, or milk of magnesia if needed.    Last pain medication given at 2: 28 pm Oxycodone 10mg oral solution    What to Expect Post Anesthesia    Rest and take it easy for the first 24 hours.  A responsible adult is recommended to remain with you during that time.  It is normal to feel sleepy.  We encourage you to not do anything that requires balance, judgment or coordination.    FOR 24 HOURS DO NOT:  Drive, operate machinery or run household appliances.  Drink beer or alcoholic beverages.  Make important decisions or sign legal documents.    To avoid nausea, slowly advance diet as tolerated, avoiding spicy or greasy foods for the first day.  Add more substantial food to your diet according to your provider's instructions.  Babies can be fed formula or breast milk as soon as they are hungry.  INCREASE FLUIDS AND FIBER TO AVOID CONSTIPATION.    MILD FLU-LIKE SYMPTOMS ARE NORMAL.  YOU MAY EXPERIENCE GENERALIZED MUSCLE ACHES, THROAT IRRITATION, HEADACHE AND/OR SOME NAUSEA.        Peripheral Nerve Block Discharge Instructions from Same Day Surgery:     What to Expect - Lower Extremity  The block may cause you to experience numbness and weakness in your thigh and knee or calf and foot on the same side as your surgery  Numbness, tingling and / or weakness are all normal. For some people, this may be an unpleasant sensation  These issues will be resolved when the local anesthetic wears off   You may experience numbness and tingling in your thigh on the same side as your surgery if the block medicine was  "injected at your groin area  Numbness will make it difficult to walk  You may have problems with balance and walking so be very careful   Follow your surgeon's direction regarding weight bearing on your surgical limb  Be very careful with your numb limb  Precautions  The numbness may affect your balance  Be careful when walking or moving around  Your leg may be weak: be very careful putting weight on it  If your surgeon did not specify a time, you should not bear weight for 24 hours  Be sure to ask for help when you need it  It is better to have help than to fall and hurt yourself    Protect the limb like a baby  Beware of exposing your limb to extreme heat or cold or trauma  The limb may be injured without you noticing because it is numb  Keep the limb elevated whenever possible  Do not sleep on the limb  Change the position of the limb regularly  Avoid putting pressure on your surgical limb  Pain Control  The initial block on the day of surgery will make your extremity feel \"numb\"  Any consecutive injection including prior to discharge from the hospital will make your extremity feel \"numb\"  You may feel an aching or burning when the local anesthesia starts to wear off  Take pain pills as prescribed by your surgeon  Call your surgeon or anesthesiologist if you do not have adequate pain control      Ice.   Elevate.   Call for questions or concerns.    Ok to shower. Keep dressings in place.    F/U appt as scheduled.   Weight Bearing as tolerated.   START Physical Therapy within 5 days.   Place Kaiden Hose Bilateral lower extremities, except you may remove them for showering.  Work hard on range of motion daily!   "

## 2023-10-05 NOTE — H&P
Surgery Orthopedic History & Physical Note    Date  10/5/2023    Primary Care Physician  Greg Reaves D.O.      Pre-Op Diagnosis Codes:     * Chronic pain of right knee [M25.561, G89.29]     * Tricompartment osteoarthritis of right knee [M17.11]    HPI  This is a 58 y.o. male who presented with right knee pain secondary to advanced djd.  He elects to undergo a right TKA.      Past Medical History:   Diagnosis Date    Arthritis     osteo- Knees    Chronic pain of both knees     Chronic pain of right knee     Dyslipidemia     Essential hypertension     Hyperlipidemia     Hypertension     Lab test positive for detection of COVID-19 virus 01/03/2022    Positive for Covid December 29.    Onychomycosis     Pain     knees    Tricompartment osteoarthritis of right knee     Type 2 diabetes mellitus without complication (HCC)     borderline- pre diabetic per patient    Vitamin B12 deficiency        Past Surgical History:   Procedure Laterality Date    KNEE ARTHROSCOPY Bilateral 05/30/2019    Procedure: ARTHROSCOPY, KNEE;  Surgeon: Vinayak Kuhn M.D.;  Location: SURGERY [facility];  Service: Orthopedics    MENISCECTOMY, KNEE, MEDIAL Bilateral 05/30/2019    Procedure: MENISCECTOMY, KNEE, MEDIAL - PARTIAL;  Surgeon: Vinayak Kuhn M.D.;  Location: SURGERY [facility];  Service: Orthopedics    KNEE ARTHROSCOPY Right 2006    FUSION, SPINE, LUMBAR, PLIF  1999, 2001    x2 surgeries    ROTATOR CUFF REPAIR Right        Current Facility-Administered Medications   Medication Dose Route Frequency Provider Last Rate Last Admin    lactated ringers infusion   Intravenous Continuous Vinayak Kuhn M.D. 10 mL/hr at 10/05/23 1123 New Bag at 10/05/23 1123    ceFAZolin (Ancef) injection 3 g  3 g Intravenous Once Vinayak Kuhn M.D.           Social History     Socioeconomic History    Marital status:      Spouse name: Not on file    Number of children: Not on file    Years of education: Not on file    Highest education  level: Not on file   Occupational History    Not on file   Tobacco Use    Smoking status: Never    Smokeless tobacco: Never   Vaping Use    Vaping Use: Never used   Substance and Sexual Activity    Alcohol use: Not Currently     Comment: 1-2 per day, quit drinking for 3 months now    Drug use: No    Sexual activity: Yes     Partners: Female   Other Topics Concern    Not on file   Social History Narrative    Not on file     Social Determinants of Health     Financial Resource Strain: Not on file   Food Insecurity: Not on file   Transportation Needs: Not on file   Physical Activity: Not on file   Stress: Not on file   Social Connections: Not on file   Intimate Partner Violence: Not on file   Housing Stability: Not on file       Family History   Problem Relation Age of Onset    No Known Problems Mother     Hypertension Father     No Known Problems Sister     No Known Problems Brother     Heart Attack Maternal Uncle     No Known Problems Maternal Grandmother     Heart Attack Maternal Grandfather     Heart Attack Paternal Grandmother     No Known Problems Paternal Grandfather        Allergies  Patient has no known allergies.    Review of Systems  Negative    Physical Exam  Lungs:  CTA bilat  CV:  RRR  Right knee:  pain and crepitus with range of motion  Vital Signs  Blood Pressure: (!) 160/98   Temperature: 36.3 °C (97.3 °F)   Pulse: 94   Respiration: 18   Pulse Oximetry: 97 %       Labs:                    Radiology:  No orders to display         Assessment/Plan:  Pre-Op Diagnosis Codes:     * Chronic pain of right knee [M25.561, G89.29]     * Tricompartment osteoarthritis of right knee [M17.11]  Procedure(s):  ARTHROPLASTY, KNEE, TOTAL, RIGHT

## 2023-10-05 NOTE — ANESTHESIA PROCEDURE NOTES
Airway    Date/Time: 10/5/2023 1:03 PM    Performed by: Tobey Gansert, M.D.  Authorized by: Tobey Gansert, M.D.    Location:  OR  Urgency:  Elective  Indications for Airway Management:  Anesthesia      Spontaneous Ventilation: absent    Sedation Level:  Deep  Preoxygenated: Yes    Final Airway Type:  Supraglottic airway  Final Supraglottic Airway:  Standard LMA    SGA Size:  4  Number of Attempts at Approach:  1

## 2023-10-05 NOTE — THERAPY
Physical Therapy   Initial Evaluation     Patient Name: Braden Cruz  Age:  58 y.o., Sex:  male  Medical Record #: [id number]  Today's Date: 10/5/2023          Assessment  Patient is 58 y.o. male who was seen s/p right TKA with WBAT orders for right lower extremity. Pt was agreeable to therapy evaluation and presented with safe upright functional mobility with AD use after therapy session. Pt was provided with education on elevation, icing, positioning, and supine/seated therapeutic exercises. Pt was able to return demo safe gait mechanics with use of AD on flat level surfaces with appropriate heel to toe gait mechanics. Pt was able to return demo safe mechanics in order to navigate stairs with use of FWW. Pt was able to demonstrate safe use of AD during transfers/transitions and general locomotion with no emelyn LOB. Pt has no acute skilled PT needs at this time, anticipate pt to d/c home once medically clear with recs for FWW use and OP therapy services.     The pt was provided with the following skilled therapy txt: Pt was provided with VC, demo, and facilitation during gait training in order to return demo terminal knee extension and appropriate heel strike/flexion of knee in order to return demo appropriate heel to toe gait mechanics. Pt was provided with demo and VC to go up/down steps with safe mechanics with use of AD. Pt was provided with VC and TC for appropriate quad contraction and mechanics during supine/seated therapeutic exercises. Pt was provided with education on intensity, frequency, and duration of exercises.    Plan    Physical Therapy Initial Treatment Plan   Duration: (P) Evaluation only    DC Equipment Recommendations: (P) Front-Wheel Walker  Discharge Recommendations: (P) Recommend outpatient physical therapy services to address higher level deficits     Objective       10/05/23 1640   Initial Contact Note    Initial Contact Note Order Received and Verified, Evaluation Only -  Patient Does Not Require Further Acute Physical Therapy at this Time.  However, May Benefit from Post Acute Therapy for Higher Level Functional Deficits.   Pain 0 - 10 Group   Location Knee   Location Orientation Right   Description Aching   Therapist Pain Assessment During Activity;Prior to Activity;Post Activity;Nurse Notified;5   Prior Living Situation   Prior Services None   Housing / Facility 1 Story House   Steps Into Home 1   Steps In Home 0   Equipment Owned None   Lives with - Patient's Self Care Capacity Spouse   Prior Level of Functional Mobility   Bed Mobility Independent   Transfer Status Independent   Ambulation Independent   Ambulation Distance   (community)   Assistive Devices Used None   Stairs Independent   History of Falls   History of Falls No   Cognition    Cognition / Consciousness WDL   Comments pleasant/cooperative   Passive ROM Upper Body   Passive ROM Upper Body WDL   Active ROM Upper Body   Active ROM Upper Body  WDL   Strength Upper Body   Upper Body Strength  WDL   Sensation Upper Body   Upper Extremity Sensation  WDL   Upper Body Muscle Tone   Upper Body Muscle Tone  WDL   Passive ROM Lower Body   Passive ROM Lower Body X   Comments limited due to post op pain, able to demo 0-70 deg of R knee ROM   Active ROM Lower Body    Active ROM Lower Body  X   Comments same as above   Strength Lower Body   Lower Body Strength  X   Comments limited due to pain, able to demo functional strength   Sensation Lower Body   Lower Extremity Sensation   WDL   Lower Body Muscle Tone   Lower Body Muscle Tone  WDL   Neurological Concerns   Neurological Concerns No   Coordination Upper Body   Coordination WDL   Coordination Lower Body    Coordination Lower Body  WDL   Balance Assessment   Sitting Balance (Static) Good   Sitting Balance (Dynamic) Fair +   Standing Balance (Static) Good   Standing Balance (Dynamic) Fair +   Weight Shift Sitting Good   Weight Shift Standing Fair   Comments w/fww use   Bed  Mobility    Comments found up in chair   Gait Analysis   Gait Level Of Assist Supervised   Assistive Device Front Wheel Walker   Distance (Feet) 150   # of Times Distance was Traveled 1   Deviation Antalgic   # of Stairs Climbed 1   Level of Assist with Stairs Supervised   Weight Bearing Status wbat R LE   Functional Mobility   Sit to Stand Standby Assist   Bed, Chair, Wheelchair Transfer Standby Assist   Transfer Method Stand Step   Mobility EOB, sit<>stand, ambulation, stairs, back to chair   How much difficulty does the patient currently have...   Turning over in bed (including adjusting bedclothes, sheets and blankets)? 4   Sitting down on and standing up from a chair with arms (e.g., wheelchair, bedside commode, etc.) 4   Moving from lying on back to sitting on the side of the bed? 4   How much help from another person does the patient currently need...   Moving to and from a bed to a chair (including a wheelchair)? 4   Need to walk in a hospital room? 4   Climbing 3-5 steps with a railing? 3   6 clicks Mobility Score 23   Activity Tolerance   Sitting in Chair NT   Sitting Edge of Bed 5 mins   Standing 10 mins   Comments slightly dizzy   Education Group   Education Provided Role of Physical Therapist   Role of Physical Therapist Patient Response Patient;Acceptance;Explanation;Demonstration;Verbal Demonstration;Action Demonstration   Physical Therapy Initial Treatment Plan    Duration Evaluation only   Anticipated Discharge Equipment and Recommendations   DC Equipment Recommendations Front-Wheel Walker   Discharge Recommendations Recommend outpatient physical therapy services to address higher level deficits   Interdisciplinary Plan of Care Collaboration   IDT Collaboration with  Nursing   Patient Position at End of Therapy Call Light within Reach;Tray Table within Reach;Phone within Reach;Seated   Collaboration Comments aware of visit and recs   Session Information   Date / Session Number  10/5 eval only    Priority 0

## 2023-10-05 NOTE — OR NURSING
1535:  Xray here    1550:  No change, pt resting with eyes closed.    1600:  Report given to Iglesia ÁLVAREZ

## 2023-10-05 NOTE — ANESTHESIA TIME REPORT
Anesthesia Start and Stop Event Times     Date Time Event    10/5/2023 1231 Ready for Procedure     1258 Anesthesia Start     1412 Anesthesia Stop        Responsible Staff  10/05/23    Name Role Begin End    Tobey Gansert, M.D. Anesth 1258 1412        Overtime Reason:  no overtime (within assigned shift)    Comments:

## 2023-10-06 NOTE — OR NURSING
1610: Patient to Phase II from PACU. Patient assisted with dressing with help from CNA.     1620: PT with patient    1640: Patient cleared by PT    1645: Patient given incentive spirometer and instructed on use, O2 saturation 86-94% on RA    1700: Continues to intermittently use incentive spirometer, room air O2 saturation 89-94%     1730: drowsy, desat to 84% when sleeping, placed on 2L NC and taking a nap    1800: states that he feels much better, Continues to intermittently use incentive spirometer, room air O2 saturation 90-94%     1830: Maintaining room air saturation greater than 90%     1848: Discharge education completed and family denies further questions.     1859: Discharged to care of family post uneventful stay in phase II recovery.

## 2023-10-14 DIAGNOSIS — E11.9 TYPE 2 DIABETES MELLITUS WITHOUT COMPLICATION, WITHOUT LONG-TERM CURRENT USE OF INSULIN (HCC): ICD-10-CM

## 2023-10-14 DIAGNOSIS — I10 ESSENTIAL HYPERTENSION: ICD-10-CM

## 2023-10-14 DIAGNOSIS — E78.5 DYSLIPIDEMIA: ICD-10-CM

## 2023-10-16 RX ORDER — ATORVASTATIN CALCIUM 40 MG/1
40 TABLET, FILM COATED ORAL EVERY EVENING
Qty: 90 TABLET | Refills: 3 | Status: SHIPPED | OUTPATIENT
Start: 2023-10-16

## 2023-10-16 RX ORDER — METFORMIN HYDROCHLORIDE 500 MG/1
500 TABLET, EXTENDED RELEASE ORAL 2 TIMES DAILY
Qty: 180 TABLET | Refills: 3 | Status: SHIPPED | OUTPATIENT
Start: 2023-10-16

## 2023-10-16 RX ORDER — LOSARTAN POTASSIUM 100 MG/1
100 TABLET ORAL EVERY EVENING
Qty: 90 TABLET | Refills: 3 | Status: SHIPPED | OUTPATIENT
Start: 2023-10-16

## 2023-10-16 RX ORDER — AMLODIPINE BESYLATE 10 MG/1
10 TABLET ORAL EVERY EVENING
Qty: 90 TABLET | Refills: 3 | Status: SHIPPED | OUTPATIENT
Start: 2023-10-16

## 2023-11-07 NOTE — ASSESSMENT & PLAN NOTE
Blood pressure elevated in the office today.  Patient continues on amlodipine 10 mg and losartan 50 mg.  Patient states that he has been seen by [facility] 4 times over the last 4 months and has not had a problem with blood pressure readings.   Left sided head/ neck tightness/ pressure w congestion since today, no fever

## 2024-01-30 PROBLEM — M25.561 RIGHT KNEE PAIN: Status: ACTIVE | Noted: 2024-01-30

## 2024-01-30 PROBLEM — Z96.651 HISTORY OF TOTAL KNEE REPLACEMENT, RIGHT: Status: ACTIVE | Noted: 2024-01-30

## 2024-03-06 ENCOUNTER — OFFICE VISIT (OUTPATIENT)
Dept: MEDICAL GROUP | Facility: MEDICAL CENTER | Age: 59
End: 2024-03-06
Payer: COMMERCIAL

## 2024-03-06 VITALS
TEMPERATURE: 97.8 F | OXYGEN SATURATION: 94 % | HEIGHT: 70 IN | BODY MASS INDEX: 36.54 KG/M2 | SYSTOLIC BLOOD PRESSURE: 110 MMHG | RESPIRATION RATE: 16 BRPM | WEIGHT: 255.2 LBS | DIASTOLIC BLOOD PRESSURE: 60 MMHG | HEART RATE: 105 BPM

## 2024-03-06 DIAGNOSIS — G89.29 CHRONIC PAIN OF RIGHT KNEE: ICD-10-CM

## 2024-03-06 DIAGNOSIS — E55.9 VITAMIN D DEFICIENCY: ICD-10-CM

## 2024-03-06 DIAGNOSIS — Z12.5 PROSTATE CANCER SCREENING: ICD-10-CM

## 2024-03-06 DIAGNOSIS — M25.511 CHRONIC RIGHT SHOULDER PAIN: ICD-10-CM

## 2024-03-06 DIAGNOSIS — E66.9 OBESITY (BMI 35.0-39.9 WITHOUT COMORBIDITY): ICD-10-CM

## 2024-03-06 DIAGNOSIS — I10 ESSENTIAL HYPERTENSION: ICD-10-CM

## 2024-03-06 DIAGNOSIS — E53.8 VITAMIN B12 DEFICIENCY: ICD-10-CM

## 2024-03-06 DIAGNOSIS — M25.561 CHRONIC PAIN OF RIGHT KNEE: ICD-10-CM

## 2024-03-06 DIAGNOSIS — E11.9 TYPE 2 DIABETES MELLITUS WITHOUT COMPLICATION, WITHOUT LONG-TERM CURRENT USE OF INSULIN (HCC): ICD-10-CM

## 2024-03-06 DIAGNOSIS — E78.5 DYSLIPIDEMIA: ICD-10-CM

## 2024-03-06 DIAGNOSIS — M79.89 SOFT TISSUE MASS: ICD-10-CM

## 2024-03-06 DIAGNOSIS — Z96.651 HISTORY OF TOTAL KNEE REPLACEMENT, RIGHT: ICD-10-CM

## 2024-03-06 DIAGNOSIS — Z11.4 ENCOUNTER FOR SCREENING FOR HIV: ICD-10-CM

## 2024-03-06 DIAGNOSIS — G89.29 CHRONIC RIGHT SHOULDER PAIN: ICD-10-CM

## 2024-03-06 PROBLEM — M17.11 TRICOMPARTMENT OSTEOARTHRITIS OF RIGHT KNEE: Status: RESOLVED | Noted: 2023-05-23 | Resolved: 2024-03-06

## 2024-03-06 PROCEDURE — 3074F SYST BP LT 130 MM HG: CPT | Performed by: FAMILY MEDICINE

## 2024-03-06 PROCEDURE — 3078F DIAST BP <80 MM HG: CPT | Performed by: FAMILY MEDICINE

## 2024-03-06 PROCEDURE — 99214 OFFICE O/P EST MOD 30 MIN: CPT | Performed by: FAMILY MEDICINE

## 2024-03-06 RX ORDER — SEMAGLUTIDE 1.34 MG/ML
INJECTION, SOLUTION SUBCUTANEOUS
COMMUNITY
Start: 2024-02-14

## 2024-03-06 ASSESSMENT — FIBROSIS 4 INDEX: FIB4 SCORE: 0.88

## 2024-03-06 ASSESSMENT — PATIENT HEALTH QUESTIONNAIRE - PHQ9: CLINICAL INTERPRETATION OF PHQ2 SCORE: 0

## 2024-03-06 NOTE — PROGRESS NOTES
"Verbal consent was acquired by the patient to use [employer] ambient listening note generation during this visit     Subjective:     CC: \"lump on back and right shoulder pain\"      History of Present Illness  The patient is a 58-year-old male. He has an itchy skin lesion we need to look at and right shoulder pain.    His wife noticed a skin lesion on his right upper back about a month ago. It is not painful. He has itchy skin all the time, but it is different. He has had a lipoma in the past.    He has been having a lot of pain in his right shoulder. He has had rotator cuff surgery in the past on his right arm. He is having a terrible time sleeping. He can not lay on his right side. If he picks up his arm wrong, it hurts. He has pain on the AC joint. It has been there for a while, but he has noticed it more over the past couple of months. It is getting worse. Reaching behind him is painful.    He had a knee replacement surgery. He is not happy with it. He is going back on 03/25/2023 to clean up the scar tissue. He can not straighten his leg out. He is 3 to 4 degrees short of straightening it. He is going to manually manipulate it while he is under anesthesia.    He is due for lab work for his diabetes. He has lost 50 pounds with lifestyle changes. 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 His highest weight was 311. 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 He feels a lot better.          Objective:     Exam:  /60 (BP Location: Right arm, Patient Position: Sitting, BP Cuff Size: Adult)   Pulse (!) 105   Temp 36.6 °C (97.8 °F) (Temporal)   Resp 16   Ht 1.778 m (5' 10\")   Wt 116 kg (255 lb 3.2 oz)   SpO2 94%   BMI 36.62 kg/m²  Body mass index is 36.62 kg/m².    Physical Exam  Vitals reviewed.   Constitutional:       General: He is not in acute distress.     Appearance: Normal appearance. He is obese.   HENT:      Head: Normocephalic and " atraumatic.   Cardiovascular:      Rate and Rhythm: Normal rate and regular rhythm.      Heart sounds: Normal heart sounds.   Pulmonary:      Effort: Pulmonary effort is normal. No respiratory distress.      Breath sounds: Normal breath sounds.   Musculoskeletal:         General: No swelling, deformity or signs of injury. Normal range of motion.   Skin:     General: Skin is warm and dry.      Findings: Lesion (soft, mobile, smooth lesion on upper right back and left anterior shoulder) present.   Neurological:      Mental Status: He is alert. Mental status is at baseline.      Gait: Gait abnormal.   Psychiatric:         Mood and Affect: Mood normal.         Behavior: Behavior normal.           Results        Assessment & Plan:       1. Soft tissue mass  - US-SOFT TISSUES OF HEAD - NECK; Future    2. Chronic right shoulder pain  - Referral to Orthopedics    3. History of total knee replacement, right    4. Chronic pain of right knee    5. Type 2 diabetes mellitus without complication, without long-term current use of insulin (HCC)  - CBC WITH DIFFERENTIAL; Future  - Comp Metabolic Panel; Future  - Lipid Profile; Future  - HEMOGLOBIN A1C; Future  - VITAMIN B12; Future  - MICROALBUMIN CREAT RATIO URINE; Future    6. Obesity (BMI 35.0-39.9 without comorbidity)  - CBC WITH DIFFERENTIAL; Future  - Comp Metabolic Panel; Future  - Lipid Profile; Future    7. Essential hypertension  - Comp Metabolic Panel; Future  - MICROALBUMIN CREAT RATIO URINE; Future    8. Dyslipidemia  - Comp Metabolic Panel; Future  - Lipid Profile; Future    9. Vitamin B12 deficiency  - VITAMIN B12; Future    10. Encounter for screening for HIV  - HIV AG/AB COMBO ASSAY SCREENING; Future    11. Prostate cancer screening  - PROSTATE SPECIFIC AG SCREENING; Future    12. Vitamin D deficiency  - VITAMIN D,25 HYDROXY (DEFICIENCY); Future    Other orders  - OZEMPIC, 1 MG/DOSE, 4 MG/3ML Solution Pen-injector      Assessment & Plan  1. Soft tissue mass.  He has  2 subdermal masses, one on his right upper back just superior to the scapula and one on his anterior left shoulder. They are both smooth, soft, and mobile. I suspect these are lipomas, but given that the one on his right did show up quite quickly and he is having right shoulder pain that may have been changed by this mass, we are not sure. I do think getting an ultrasound to better characterize and make sure this is not something more insidious is appropriate.    2. Chronic right shoulder pain.  The patient has had procedure on his rotator cuff previously. His shoulder is quite painful. He can reach overhead, but it is antalgic when he goes up above level. It hurts to sleep on that side. We discussed conservative therapy, but given his symptoms and that has been ongoing and even worsening, we will refer him to orthopedics for further evaluation.    3. History of total knee replacement on the right.  He was hopeful that he will get better mobility, but his surgeon's concern that he has built up scar tissue, so they are going back to try to clean that up. Hopefully, he will respond well and be able to continue forward with his exercise.    4. Chronic pain of the right knee.  See problem # 3.    5. Type 2 diabetes mellitus without complications without long term current use of insulin.  Last A1c in 09/2023 was 5.7. He has been very well controlled. His work and lifestyle change has been very helpful with this. We will have him continue with metformin 1 tablet by mouth twice a day. Also, he will get benefit out of Ozempic 1 mg weekly. The patient is on atorvastatin 40 mg for cardiovascular protection and losartan 100 mg for renal protection.    6. Obesity, BMI of 36.6.  He has had nearly a 50-pound weight loss since retiring and opening up his [employer] business. His mental health seems improved as well. He is going to continue to eat a healthy diet and try to get exercise as his body allows.    7. Essential  hypertension.  This is a chronic condition, is stable. His blood pressure is at goal under 140/90. The patient is to continue amlodipine 10 mg at bedtime and losartan 100 mg at bedtime. We will continue to monitor annually.    8. Dyslipidemia.  His last lipid panel was in 06/2023 and his cholesterol had responded well to medical therapy and lifestyle changes with his lipids at goal. He is not complaining of any chest pain or body aches. The patient is to continue atorvastatin 40 mg nightly and aspirin 81 mg daily.    9. Vitamin B12 deficiency.  This has been an ongoing issue for him. I will check a new level.    10. Prostate cancer screening.  The patient is due for annual PSA after 06/07/2024. His last PSA was 1.25. .    Follow-up  The patient will follow up in 06/2024 for an annual visit.         Return in about 14 weeks (around 6/12/2024) for Annual Visit, Lab F/U, Diabetes F/U.      This note was created using voice recognition software (Dragon). The accuracy of the dictation is limited by the abilities of the software. I have reviewed the note prior to signing, however some errors in grammar and context are still possible. If you have any questions related to this note please do not hesitate to contact our office.

## 2024-03-08 PROBLEM — M25.561 RIGHT KNEE PAIN: Status: ACTIVE | Noted: 2024-01-30

## 2024-03-24 PROBLEM — M19.90 ARTHRITIS: Status: ACTIVE | Noted: 2024-03-24

## 2024-04-23 RX ORDER — SEMAGLUTIDE 1.34 MG/ML
INJECTION, SOLUTION SUBCUTANEOUS
Qty: 9 ML | Refills: 3 | Status: SHIPPED | OUTPATIENT
Start: 2024-04-23 | End: 2024-04-29 | Stop reason: SDUPTHER

## 2024-04-23 NOTE — TELEPHONE ENCOUNTER
Received request via: Pharmacy    Was the patient seen in the last year in this department? Yes    Does the patient have an active prescription (recently filled or refills available) for medication(s) requested? No    [facility]    Does the patient have FDC Plus and need 100 day supply (blood pressure, diabetes and cholesterol meds only)? Patient does not have SCP

## 2024-04-25 DIAGNOSIS — G47.26 CIRCADIAN RHYTHM SLEEP DISORDER, SHIFT WORK TYPE: ICD-10-CM

## 2024-04-25 RX ORDER — ZOLPIDEM TARTRATE 5 MG/1
5 TABLET ORAL NIGHTLY PRN
Qty: 30 TABLET | Refills: 0 | Status: SHIPPED | OUTPATIENT
Start: 2024-04-25 | End: 2024-07-24

## 2024-04-25 NOTE — TELEPHONE ENCOUNTER
Received request via: Pharmacy    Was the patient seen in the last year in this department? Yes    Does the patient have an active prescription (recently filled or refills available) for medication(s) requested? No    Pharmacy Name: [facility]    Does the patient have care home Plus and need 100 day supply (blood pressure, diabetes and cholesterol meds only)? Patient does not have SCP

## 2024-04-29 DIAGNOSIS — E11.9 TYPE 2 DIABETES MELLITUS WITHOUT COMPLICATION, WITHOUT LONG-TERM CURRENT USE OF INSULIN (HCC): ICD-10-CM

## 2024-04-29 RX ORDER — SEMAGLUTIDE 1.34 MG/ML
INJECTION, SOLUTION SUBCUTANEOUS
Qty: 9 ML | Refills: 3 | Status: SHIPPED | OUTPATIENT
Start: 2024-04-29

## 2024-04-29 NOTE — TELEPHONE ENCOUNTER
Received request via: Pharmacy    Was the patient seen in the last year in this department? Yes    Does the patient have an active prescription (recently filled or refills available) for medication(s) requested? No    [facility] Name: [facility]    Does the patient have intermediate Plus and need 100 day supply (blood pressure, diabetes and cholesterol meds only)? Patient does not have SCP

## 2024-05-01 ENCOUNTER — PATIENT MESSAGE (OUTPATIENT)
Dept: MEDICAL GROUP | Facility: MEDICAL CENTER | Age: 59
End: 2024-05-01
Payer: COMMERCIAL

## 2024-05-01 DIAGNOSIS — E11.9 TYPE 2 DIABETES MELLITUS WITHOUT COMPLICATION, WITHOUT LONG-TERM CURRENT USE OF INSULIN (HCC): ICD-10-CM

## 2024-05-01 RX ORDER — SEMAGLUTIDE 1.34 MG/ML
INJECTION, SOLUTION SUBCUTANEOUS
Qty: 9 ML | Refills: 3 | Status: SHIPPED | OUTPATIENT
Start: 2024-05-01

## 2024-05-03 ENCOUNTER — TELEPHONE (OUTPATIENT)
Dept: MEDICAL GROUP | Facility: MEDICAL CENTER | Age: 59
End: 2024-05-03
Payer: COMMERCIAL

## 2024-05-03 NOTE — TELEPHONE ENCOUNTER
FINAL PRIOR AUTHORIZATION STATUS:    1.  Name of Medication & Dose: Ozempic Inj 4mg/3ml      2. Prior Auth Status: Approved through 5/2/25     3. Action Taken: Pharmacy Notified: yes Patient Notified: yes

## 2024-05-16 DIAGNOSIS — G47.26 CIRCADIAN RHYTHM SLEEP DISORDER, SHIFT WORK TYPE: ICD-10-CM

## 2024-05-16 RX ORDER — ZOLPIDEM TARTRATE 5 MG/1
5 TABLET ORAL NIGHTLY PRN
Qty: 30 TABLET | Refills: 0 | Status: SHIPPED | OUTPATIENT
Start: 2024-05-16 | End: 2024-08-14

## 2024-07-11 RX ORDER — SEMAGLUTIDE 1.34 MG/ML
INJECTION, SOLUTION SUBCUTANEOUS
COMMUNITY

## 2024-07-11 RX ORDER — AMOXICILLIN 500 MG/1
CAPSULE ORAL
COMMUNITY
Start: 2024-04-15

## 2024-07-11 RX ORDER — LOSARTAN POTASSIUM 50 MG/1
50 TABLET ORAL
COMMUNITY
Start: 2024-03-18

## 2024-07-11 RX ORDER — ASPIRIN 81 MG/1
1 TABLET ORAL DAILY
COMMUNITY
Start: 2024-03-18

## 2024-07-11 RX ORDER — AMLODIPINE BESYLATE 5 MG/1
5 TABLET ORAL DAILY
COMMUNITY

## 2024-07-11 RX ORDER — HYDROXYZINE HYDROCHLORIDE 25 MG/1
25 TABLET, FILM COATED ORAL
COMMUNITY
Start: 2024-03-18

## 2024-07-11 RX ORDER — IBUPROFEN 600 MG/1
600 TABLET ORAL
COMMUNITY
Start: 2024-03-18

## 2024-07-11 RX ORDER — IBUPROFEN 600 MG/1
1 TABLET ORAL 2 TIMES DAILY PRN
COMMUNITY
Start: 2024-03-18

## 2024-07-11 RX ORDER — ATORVASTATIN CALCIUM 20 MG/1
20 TABLET, FILM COATED ORAL
COMMUNITY
Start: 2024-03-18

## 2024-07-11 RX ORDER — HYDROCODONE BITARTRATE AND ACETAMINOPHEN 5; 325 MG/1; MG/1
TABLET ORAL
COMMUNITY
Start: 2024-03-25

## 2024-07-12 ENCOUNTER — OFFICE VISIT (OUTPATIENT)
Dept: MEDICAL GROUP | Facility: MEDICAL CENTER | Age: 59
End: 2024-07-12
Payer: COMMERCIAL

## 2024-07-12 VITALS
DIASTOLIC BLOOD PRESSURE: 66 MMHG | HEIGHT: 70 IN | HEART RATE: 120 BPM | SYSTOLIC BLOOD PRESSURE: 102 MMHG | OXYGEN SATURATION: 95 % | TEMPERATURE: 98 F | RESPIRATION RATE: 20 BRPM | BODY MASS INDEX: 36.65 KG/M2 | WEIGHT: 256 LBS

## 2024-07-12 DIAGNOSIS — Z96.651 HISTORY OF TOTAL KNEE REPLACEMENT, RIGHT: ICD-10-CM

## 2024-07-12 DIAGNOSIS — M25.561 CHRONIC PAIN OF BOTH KNEES: ICD-10-CM

## 2024-07-12 DIAGNOSIS — G89.29 CHRONIC PAIN OF BOTH KNEES: ICD-10-CM

## 2024-07-12 DIAGNOSIS — M25.562 CHRONIC PAIN OF BOTH KNEES: ICD-10-CM

## 2024-07-12 DIAGNOSIS — Z02.89 ENCOUNTER FOR COMPLETION OF FORM WITH PATIENT: ICD-10-CM

## 2024-07-12 PROCEDURE — 3078F DIAST BP <80 MM HG: CPT | Performed by: FAMILY MEDICINE

## 2024-07-12 PROCEDURE — 3074F SYST BP LT 130 MM HG: CPT | Performed by: FAMILY MEDICINE

## 2024-07-12 PROCEDURE — 99213 OFFICE O/P EST LOW 20 MIN: CPT | Performed by: FAMILY MEDICINE

## 2024-07-12 ASSESSMENT — FIBROSIS 4 INDEX: FIB4 SCORE: 0.9

## 2024-07-15 DIAGNOSIS — G47.26 CIRCADIAN RHYTHM SLEEP DISORDER, SHIFT WORK TYPE: ICD-10-CM

## 2024-07-16 RX ORDER — ZOLPIDEM TARTRATE 5 MG/1
5 TABLET ORAL NIGHTLY PRN
Qty: 30 TABLET | Refills: 0 | Status: SHIPPED | OUTPATIENT
Start: 2024-07-16 | End: 2024-10-14

## 2024-08-25 DIAGNOSIS — E11.9 TYPE 2 DIABETES MELLITUS WITHOUT COMPLICATION, WITHOUT LONG-TERM CURRENT USE OF INSULIN (HCC): ICD-10-CM

## 2024-08-26 RX ORDER — SEMAGLUTIDE 1.34 MG/ML
INJECTION, SOLUTION SUBCUTANEOUS
Qty: 9 ML | Refills: 1 | Status: SHIPPED | OUTPATIENT
Start: 2024-08-26

## 2024-09-29 DIAGNOSIS — E11.9 TYPE 2 DIABETES MELLITUS WITHOUT COMPLICATION, WITHOUT LONG-TERM CURRENT USE OF INSULIN (HCC): ICD-10-CM

## 2024-09-30 RX ORDER — SEMAGLUTIDE 1.34 MG/ML
INJECTION, SOLUTION SUBCUTANEOUS
Qty: 9 ML | Refills: 0 | Status: SHIPPED | OUTPATIENT
Start: 2024-09-30

## 2024-11-03 DIAGNOSIS — E11.9 TYPE 2 DIABETES MELLITUS WITHOUT COMPLICATION, WITHOUT LONG-TERM CURRENT USE OF INSULIN (HCC): ICD-10-CM

## 2024-11-04 RX ORDER — SEMAGLUTIDE 1.34 MG/ML
INJECTION, SOLUTION SUBCUTANEOUS
Qty: 9 ML | Refills: 0 | Status: SHIPPED | OUTPATIENT
Start: 2024-11-04

## 2024-11-04 NOTE — TELEPHONE ENCOUNTER
Received request via: Patient    Was the patient seen in the last year in this department? Yes    Does the patient have an active prescription (recently filled or refills available) for medication(s) requested? No    Pharmacy Name:   [id number] - [place] - [address] [facility]  [address] [facility]  [place] [address]  Phone: [phone] Fax: [phone]       Does the patient have half-way Plus and need 100-day supply? (This applies to ALL medications) Patient does not have SCP

## 2024-12-19 ENCOUNTER — APPOINTMENT (OUTPATIENT)
Dept: MEDICAL GROUP | Facility: MEDICAL CENTER | Age: 59
End: 2024-12-19
Payer: COMMERCIAL

## 2025-01-19 DIAGNOSIS — E11.9 TYPE 2 DIABETES MELLITUS WITHOUT COMPLICATION, WITHOUT LONG-TERM CURRENT USE OF INSULIN (HCC): ICD-10-CM

## 2025-01-20 RX ORDER — SEMAGLUTIDE 1.34 MG/ML
INJECTION, SOLUTION SUBCUTANEOUS
Qty: 9 ML | Refills: 0 | Status: SHIPPED | OUTPATIENT
Start: 2025-01-20

## 2025-03-09 DIAGNOSIS — E11.9 TYPE 2 DIABETES MELLITUS WITHOUT COMPLICATION, WITHOUT LONG-TERM CURRENT USE OF INSULIN (HCC): ICD-10-CM

## 2025-03-10 NOTE — TELEPHONE ENCOUNTER
Received request via: Pharmacy    Was the patient seen in the last year in this department? Yes    Does the patient have an active prescription (recently filled or refills available) for medication(s) requested? No    Pharmacy Name:    [id number] - [place], [place] - [address] [facility]          Does the patient have retirement Plus and need 100-day supply? (This applies to ALL medications) Patient does not have SCP

## 2025-03-11 DIAGNOSIS — E78.5 DYSLIPIDEMIA: ICD-10-CM

## 2025-03-11 DIAGNOSIS — I10 ESSENTIAL HYPERTENSION: ICD-10-CM

## 2025-03-11 DIAGNOSIS — Z11.4 ENCOUNTER FOR SCREENING FOR HIV: ICD-10-CM

## 2025-03-11 DIAGNOSIS — E53.8 VITAMIN B12 DEFICIENCY: ICD-10-CM

## 2025-03-11 DIAGNOSIS — Z13.21 ENCOUNTER FOR VITAMIN DEFICIENCY SCREENING: ICD-10-CM

## 2025-03-11 DIAGNOSIS — E66.9 OBESITY (BMI 35.0-39.9 WITHOUT COMORBIDITY): ICD-10-CM

## 2025-03-11 DIAGNOSIS — E11.9 TYPE 2 DIABETES MELLITUS WITHOUT COMPLICATION, WITHOUT LONG-TERM CURRENT USE OF INSULIN (HCC): ICD-10-CM

## 2025-03-11 RX ORDER — SEMAGLUTIDE 1.34 MG/ML
INJECTION, SOLUTION SUBCUTANEOUS
Qty: 3 ML | Refills: 0 | Status: SHIPPED | OUTPATIENT
Start: 2025-03-11

## 2025-06-20 ENCOUNTER — OFFICE VISIT (OUTPATIENT)
Dept: MEDICAL GROUP | Facility: PHYSICIAN GROUP | Age: 60
End: 2025-06-20
Payer: COMMERCIAL

## 2025-06-20 VITALS
DIASTOLIC BLOOD PRESSURE: 74 MMHG | HEIGHT: 70 IN | WEIGHT: 286 LBS | TEMPERATURE: 97.5 F | BODY MASS INDEX: 40.94 KG/M2 | SYSTOLIC BLOOD PRESSURE: 118 MMHG | RESPIRATION RATE: 20 BRPM | HEART RATE: 97 BPM | OXYGEN SATURATION: 95 %

## 2025-06-20 DIAGNOSIS — Z98.890 HISTORY OF MELANOMA EXCISION: ICD-10-CM

## 2025-06-20 DIAGNOSIS — E11.9 TYPE 2 DIABETES MELLITUS WITHOUT COMPLICATION, UNSPECIFIED WHETHER LONG TERM INSULIN USE (HCC): ICD-10-CM

## 2025-06-20 DIAGNOSIS — E78.5 HYPERLIPIDEMIA ASSOCIATED WITH TYPE 2 DIABETES MELLITUS (HCC): ICD-10-CM

## 2025-06-20 DIAGNOSIS — E78.5 DYSLIPIDEMIA: Primary | ICD-10-CM

## 2025-06-20 DIAGNOSIS — E11.9 TYPE 2 DIABETES MELLITUS WITHOUT COMPLICATION, WITHOUT LONG-TERM CURRENT USE OF INSULIN (HCC): ICD-10-CM

## 2025-06-20 DIAGNOSIS — M25.561 CHRONIC PAIN OF RIGHT KNEE: ICD-10-CM

## 2025-06-20 DIAGNOSIS — E11.69 HYPERLIPIDEMIA ASSOCIATED WITH TYPE 2 DIABETES MELLITUS (HCC): ICD-10-CM

## 2025-06-20 DIAGNOSIS — Z13.0 SCREENING FOR DEFICIENCY ANEMIA: ICD-10-CM

## 2025-06-20 DIAGNOSIS — G89.29 CHRONIC PAIN OF RIGHT KNEE: ICD-10-CM

## 2025-06-20 DIAGNOSIS — M19.90 OSTEOARTHRITIS, UNSPECIFIED OSTEOARTHRITIS TYPE, UNSPECIFIED SITE: ICD-10-CM

## 2025-06-20 DIAGNOSIS — B35.1 ONYCHOMYCOSIS: ICD-10-CM

## 2025-06-20 DIAGNOSIS — E66.01 MORBID OBESITY WITH BMI OF 40.0-44.9, ADULT (HCC): ICD-10-CM

## 2025-06-20 DIAGNOSIS — Z85.820 HISTORY OF MELANOMA EXCISION: ICD-10-CM

## 2025-06-20 PROBLEM — E66.813 CLASS 3 SEVERE OBESITY DUE TO EXCESS CALORIES WITH SERIOUS COMORBIDITY AND BODY MASS INDEX (BMI) OF 40.0 TO 44.9 IN ADULT: Status: ACTIVE | Noted: 2018-09-12

## 2025-06-20 LAB
HBA1C MFR BLD: 6.1 % (ref ?–5.8)
POCT INT CON NEG: NEGATIVE
POCT INT CON POS: POSITIVE

## 2025-06-20 PROCEDURE — 3078F DIAST BP <80 MM HG: CPT

## 2025-06-20 PROCEDURE — 83036 HEMOGLOBIN GLYCOSYLATED A1C: CPT

## 2025-06-20 PROCEDURE — 3074F SYST BP LT 130 MM HG: CPT

## 2025-06-20 PROCEDURE — 99214 OFFICE O/P EST MOD 30 MIN: CPT

## 2025-06-20 RX ORDER — CLOBETASOL PROPIONATE 0.5 MG/G
1 OINTMENT TOPICAL DAILY
Qty: 15 G | Refills: 1 | Status: SHIPPED | OUTPATIENT
Start: 2025-06-20

## 2025-06-20 RX ORDER — SEMAGLUTIDE 1.34 MG/ML
INJECTION, SOLUTION SUBCUTANEOUS
Qty: 9 ML | Refills: 3 | Status: SHIPPED | OUTPATIENT
Start: 2025-06-20

## 2025-06-20 ASSESSMENT — ENCOUNTER SYMPTOMS
CHILLS: 0
TREMORS: 0
HEARTBURN: 1
DOUBLE VISION: 0
BLURRED VISION: 0
ABDOMINAL PAIN: 0
WEIGHT LOSS: 0
DEPRESSION: 0
DIZZINESS: 0
WEAKNESS: 0
PALPITATIONS: 0
FEVER: 0
VOMITING: 0
SHORTNESS OF BREATH: 0
COUGH: 0
NAUSEA: 0
HEADACHES: 0
BRUISES/BLEEDS EASILY: 0

## 2025-06-20 ASSESSMENT — PATIENT HEALTH QUESTIONNAIRE - PHQ9: CLINICAL INTERPRETATION OF PHQ2 SCORE: 0

## 2025-06-20 NOTE — PROGRESS NOTES
Subjective:     CC: The primary encounter diagnosis was Dyslipidemia. Diagnoses of Type 2 diabetes mellitus without complication, without long-term current use of insulin (HCC), Type 2 diabetes mellitus without complication, unspecified whether long term insulin use (HCC), Chronic pain of right knee, Osteoarthritis, unspecified osteoarthritis type, unspecified site, Onychomycosis, Screening for deficiency anemia, and History of melanoma excision were also pertinent to this visit.    HPI:   Braden presents today wanting a refill on his medications and a referral to pain management for his chronic, bilateral knee pain.    Knee pain is ongoing. R knee was replaced 10/2023 by [facility], continues to have pain at rest and worsens with activity. Left knee is worsening.  Pain is 6/10,  9/10 after activity. PT take OTC NSAIDs or rests for relief.    Due for care gaps    Current Medications[1]      ROS:  Review of Systems   Constitutional:  Negative for chills, fever, malaise/fatigue and weight loss.   HENT:  Negative for hearing loss.    Eyes:  Negative for blurred vision and double vision.   Respiratory:  Negative for cough and shortness of breath.         Related to GERD, EGD scheduled in November 2025 [facility]   Cardiovascular:  Negative for chest pain, palpitations and leg swelling.   Gastrointestinal:  Positive for heartburn. Negative for abdominal pain, nausea and vomiting.   Genitourinary:  Negative for dysuria, frequency and urgency.   Musculoskeletal:  Positive for joint pain.        Chronic, bilateral knee pain  6/10 constant, 9/10 after activity easy to fatigue  Rest and Advil with ok relief  Right knee was replaced 10/23 by [facility]     Skin:  Negative for itching and rash.   Neurological:  Negative for dizziness, tremors, weakness and headaches.   Endo/Heme/Allergies:  Does not bruise/bleed easily.   Psychiatric/Behavioral:  Negative for depression and suicidal ideas.      Monofilament testing with a 10 gram force:  "sensation intact: intact bilaterally  Visual Inspection: Feet without maceration, ulcers, fissures.  Pedal pulses: intact bilaterally   Objective:     Exam:  /74 (BP Location: Right arm, Patient Position: Sitting, BP Cuff Size: Adult long)   Pulse 97   Temp 36.4 °C (97.5 °F) (Temporal)   Resp 20   Ht 1.778 m (5' 10\")   Wt (!) 130 kg (286 lb)   SpO2 95%   BMI 41.04 kg/m²  Body mass index is 41.04 kg/m².    Physical Exam  Constitutional:       Appearance: He is obese.   HENT:      Head: Normocephalic and atraumatic.      Nose: Nose normal.      Mouth/Throat:      Mouth: Mucous membranes are moist.      Pharynx: Oropharynx is clear.   Eyes:      Pupils: Pupils are equal, round, and reactive to light.      Comments: Wears glasses   Pulmonary:      Effort: Pulmonary effort is normal.      Breath sounds: Normal breath sounds.   Musculoskeletal:         General: No swelling, tenderness or deformity.      Right lower leg: No edema.      Left lower leg: No edema.      Comments: Limited ROM to bilateral knees     Neurological:      Mental Status: He is alert.         Assessment & Plan:     60 y.o. male with the following -     Problem List Items Addressed This Visit       Type 2 diabetes mellitus without complication (HCC)    Chronic, ongoing.  A1C completed in clinic:  Due for care gaps  Patient overall doing well with this, since going up on semaglutide. Denies side effects.  Continue metformin and semaglutide   Discussed healthy diet and lifestyle.       Dyslipidemia - Primary  Chronic, ongoing. Will check lipid panel. Continue atorvastatin 40 mg daily           Chronic pain of right knee  Chronic, no improvement. Educated healthy diet and lifestyle.  Will add pain management referral per patient request.    Osteoarthrosis     Other Visit Diagnoses         Screening for deficiency anemia          History of melanoma excision                Patient was educated in proper administration of medication(s) ordered " today including safety, possible SE, risks, benefits, rationale and alternatives to therapy.   Supportive care, differential diagnoses, and indications for immediate follow-up discussed with patient.    Pathogenesis of diagnosis discussed including typical length and natural progression.    Instructed to return to clinic or nearest emergency department for any change in condition, further concerns, or worsening of symptoms.  Patient states understanding of the plan of care and discharge instructions.      Return in about 6 months (around 12/20/2025), or if symptoms worsen or fail to improve.    Please note that this dictation was created using voice recognition software. I have made every reasonable attempt to correct obvious errors, but I expect that there are errors of grammar and possibly content that I did not discover before finalizing the note.              [1]   Current Outpatient Medications   Medication Sig Dispense Refill    Semaglutide, 1 MG/DOSE, (OZEMPIC, 1 MG/DOSE,) 4 MG/3ML Solution Pen-injector INJECT SUBCUTANEOUSLY 1 MG EVERY WEEK 3 mL 0    lidocaine (LIDODERM) 5 % Patch Place  on the skin.      zolpidem (AMBIEN) 5 MG Tab Take 1 Tablet by mouth at bedtime as needed.      methocarbamol (ROBAXIN) 750 MG Tab Take 750 mg by mouth.      gabapentin (NEURONTIN) 100 MG Cap 1 capsule PO BID. May slowly increase to a max of 900mg PO BID 90 Capsule 2    amoxicillin (AMOXIL) 500 MG Cap       diclofenac sodium (VOLTAREN) 1 % Gel Apply  topically.      HYDROcodone-acetaminophen (NORCO) 5-325 MG Tab per tablet       ibuprofen (MOTRIN) 600 MG Tab Take 1 Tablet by mouth 2 times a day as needed.      amLODIPine (NORVASC) 5 MG Tab Take 5 mg by mouth every day.      aspirin 81 MG EC tablet Take 1 Tablet by mouth every day.      atorvastatin (LIPITOR) 20 MG Tab Take 20 mg by mouth.      hydrOXYzine HCl (ATARAX) 25 MG Tab 25 mg.      losartan (COZAAR) 50 MG Tab Take 50 mg by mouth.      ibuprofen (MOTRIN) 600 MG Tab Take  600 mg by mouth.      metFORMIN ER (GLUCOPHAGE XR) 500 MG TABLET SR 24 HR Take 1 Tablet by mouth 2 times a day. 180 Tablet 3    losartan (COZAAR) 100 MG Tab TAKE 1 TABLET BY MOUTH IN THE  EVENING 90 Tablet 3    amLODIPine (NORVASC) 10 MG Tab TAKE 1 TABLET BY MOUTH IN THE  EVENING 90 Tablet 3    atorvastatin (LIPITOR) 40 MG Tab TAKE 1 TABLET BY MOUTH IN THE  EVENING 90 Tablet 3    vitamin D2, Ergocalciferol, (DRISDOL) 1.25 MG (69356 UT) Cap capsule Take 1 Capsule by mouth every 7 days. 6 Capsule 0    ketoconazole (NIZORAL) 2 % Cream Apply 1 Application topically every day. 15 g 0    hydrOXYzine HCl (ATARAX) 25 MG Tab TAKE ONE TABLET BY MOUTH THREE TIMES A DAY AS NEEDED FOR ITCHING / ANXIETY 90 Tablet 5    clobetasol (TEMOVATE) 0.05 % Ointment Apply 1 Application topically every day. 15 g 1    aspirin EC (ECOTRIN) 81 MG Tablet Delayed Response Take 1 tablet by mouth every day. 30 tablet 11    cyanocobalamin (VITAMIN B12) 1000 MCG Tab Take 1 Tab by mouth every day. 30 Tab 11    Psyllium (METAMUCIL FIBER PO) Take  by mouth every day.       No current facility-administered medications for this visit.

## 2025-06-25 ENCOUNTER — OFFICE VISIT (OUTPATIENT)
Dept: PHYSICAL MEDICINE AND REHAB | Facility: MEDICAL CENTER | Age: 60
End: 2025-06-25
Payer: COMMERCIAL

## 2025-06-25 VITALS
SYSTOLIC BLOOD PRESSURE: 145 MMHG | HEART RATE: 97 BPM | DIASTOLIC BLOOD PRESSURE: 81 MMHG | BODY MASS INDEX: 40.74 KG/M2 | OXYGEN SATURATION: 94 % | HEIGHT: 70 IN | TEMPERATURE: 98.5 F | WEIGHT: 284.6 LBS

## 2025-06-25 DIAGNOSIS — Z96.653 HISTORY OF BILATERAL KNEE REPLACEMENT: ICD-10-CM

## 2025-06-25 DIAGNOSIS — G89.29 CHRONIC BILATERAL LOW BACK PAIN WITHOUT SCIATICA: ICD-10-CM

## 2025-06-25 DIAGNOSIS — M17.12 ARTHRITIS OF KNEE, LEFT: ICD-10-CM

## 2025-06-25 DIAGNOSIS — M25.561 CHRONIC KNEE PAIN AFTER TOTAL REPLACEMENT OF RIGHT KNEE JOINT: ICD-10-CM

## 2025-06-25 DIAGNOSIS — Z96.651 CHRONIC KNEE PAIN AFTER TOTAL REPLACEMENT OF RIGHT KNEE JOINT: ICD-10-CM

## 2025-06-25 DIAGNOSIS — Z71.82 EXERCISE COUNSELING: ICD-10-CM

## 2025-06-25 DIAGNOSIS — G89.29 CHRONIC KNEE PAIN AFTER TOTAL REPLACEMENT OF RIGHT KNEE JOINT: ICD-10-CM

## 2025-06-25 DIAGNOSIS — M54.50 CHRONIC BILATERAL LOW BACK PAIN WITHOUT SCIATICA: ICD-10-CM

## 2025-06-25 DIAGNOSIS — M25.551 GREATER TROCHANTERIC PAIN SYNDROME OF RIGHT LOWER EXTREMITY: ICD-10-CM

## 2025-06-25 DIAGNOSIS — E66.01 OBESITY, MORBID, BMI 40.0-49.9 (HCC): ICD-10-CM

## 2025-06-25 DIAGNOSIS — G89.29 BILATERAL CHRONIC KNEE PAIN: Primary | ICD-10-CM

## 2025-06-25 DIAGNOSIS — M25.562 BILATERAL CHRONIC KNEE PAIN: Primary | ICD-10-CM

## 2025-06-25 DIAGNOSIS — M25.561 BILATERAL CHRONIC KNEE PAIN: Primary | ICD-10-CM

## 2025-06-25 ASSESSMENT — PATIENT HEALTH QUESTIONNAIRE - PHQ9: CLINICAL INTERPRETATION OF PHQ2 SCORE: 0

## 2025-06-25 ASSESSMENT — PAIN SCALES - GENERAL: PAINLEVEL_OUTOF10: 8=MODERATE-SEVERE PAIN

## 2025-06-25 NOTE — PROGRESS NOTES
[facility] (Physical Medicine and Rehabilitation)  Interventional Pain and Spine   New Patient Visit      Date of service: See epic    Chief complaint:   Chief Complaint   Patient presents with    New Patient     Knee pain        Referring provider: Geovanna Marx,*     HISTORY    HPI: Braden Cruz 60 y.o.  who presents today with The primary encounter diagnosis was Bilateral chronic knee pain. Diagnoses of Arthritis of knee, left, History of bilateral knee replacement october 2023 by Dr Medina, Chronic knee pain after total replacement of right knee joint, Chronic bilateral low back pain without sciatica, and Greater trochanteric pain syndrome of right lower extremity were also pertinent to this visit.    HPI    Verbal consent was obtained for Moisés copilot: Yes      History of Present Illness  The patient, a 60-year-old male, presents for an initial consultation. He reports chronic bilateral low back pain, hip pain, and radicular pain extending down the left leg. Additionally, he experiences bilateral knee pain, which he describes as separate issues. The patient has a history of multiple knee and back surgeries. He rates his pain as severe, 8 out of 10 on the numeric rating scale, with greater intensity in the bilateral knees compared to the back. The pain is exacerbated by standing, walking, bending forward or backward, and ascending or descending stairs. His previous orthopedic surgeries were performed by Dr. Kuhn.    Low Back Pain and Radicular Pain  The patient has undergone two back surgeries conducted by Dr. Howard at [facility] in [place]. He has received multiple steroid injections in his back and legs over the years.  - Onset: Chronic  - Location: Bilateral low back, extending down the left leg  - Duration: Chronic  - Character: Severe pain, rated 8 out of 10  - Alleviating/Aggravating Factors: Exacerbated by standing, walking, bending forward or backward, and  ascending or descending stairs  - Severity: Severe, rated 8 out of 10    Bilateral Knee Pain  His history of knee surgeries includes arthroscopic surgery on the left knee and both arthroscopic surgery and knee replacement on the right knee. Despite these interventions, he continues to experience severe pain, rated as 8 out of 10, with greater intensity in the bilateral knees than in the back. The pain is exacerbated by standing, walking, bending forward or backward, and ascending or descending stairs. He is unable to bear weight on his knees bilaterally. The left knee is characterized by constant throbbing pain, while the right knee becomes stiff and fatigued quickly during standing or walking. He has received steroid injections in his back and legs over the years. He is uncertain about the potential for nerve blocks or ablations in his knees. His right knee replacement was performed on 10/02/2023, followed by a manual manipulation procedure due to extensive scar tissue.  - Onset: Chronic  - Location: Bilateral knees  - Duration: Chronic  - Character: Severe pain, rated 8 out of 10; left knee throbbing pain; right knee stiffness and fatigue  - Alleviating/Aggravating Factors: Exacerbated by standing, walking, bending forward or backward, and ascending or descending stairs  - Severity: Severe, rated 8 out of 10    Hip Pain  The patient also reports hip pain and has received two steroid injections in the hip bursa. His ability to drive has significantly diminished, from previously being able to drive 15 to 16 hours continuously to now struggling after 3 to 4 hours.  - Onset: Chronic  - Location: Hip  - Duration: Chronic  - Character: Pain  - Alleviating/Aggravating Factors: Received two steroid injections in the hip bursa  - Severity: Significant impact on ability to drive    He takes baby aspirin prophylactically for stroke prevention and uses Advil daily for pain management.    MEDICATIONS  - Current: aspirin  -  Current: Advil              Medical records review:  I reviewed the note from the referring provider Geovanna Marx           ROS:   Red Flags ROS:   Fever, Chills, Sweats: Denies  Involuntary Weight Loss: Denies  Bladder Incontinence: Denies  Bowel Incontinence: denies  Saddle Anesthesia: Denies    All other systems reviewed and negative.       PMHx:   Past Medical History[1]      Medications Ordered Prior to Encounter[2]     PSHx:   Past Surgical History[3]    Family history   Family History   Problem Relation Age of Onset    No Known Problems Mother     Hypertension Father     No Known Problems Sister     No Known Problems Brother     Heart Attack Maternal Uncle     No Known Problems Maternal Grandmother     Heart Attack Maternal Grandfather     Heart Attack Paternal Grandmother     No Known Problems Paternal Grandfather          Medications: reviewed on epic.   Active Medications[4]     Allergies:   Allergies[5]    Social Hx:   Social History     Socioeconomic History    Marital status:      Spouse name: Not on file    Number of children: Not on file    Years of education: Not on file    Highest education level: Not on file   Occupational History    Not on file   Tobacco Use    Smoking status: Never    Smokeless tobacco: Never   Vaping Use    Vaping status: Never Used   Substance and Sexual Activity    Alcohol use: Yes     Alcohol/week: 4.2 oz     Types: 4 Glasses of wine, 3 Cans of beer per week     Comment: 1-2 per day, quit drinking for 3 months now    Drug use: No    Sexual activity: Yes     Partners: Female   Other Topics Concern    Not on file   Social History Narrative    Not on file     Social Drivers of Health     Financial Resource Strain: Not on file   Food Insecurity: Not on file   Transportation Needs: Not on file   Physical Activity: Not on file   Stress: Not on file   Social Connections: Not on file   Intimate Partner Violence: Not on file   Housing Stability: Not on file  "        EXAMINATION     Physical Exam:   Vitals: BP (!) 145/81 (BP Location: Right arm, Patient Position: Sitting, BP Cuff Size: Large adult)   Pulse 97   Temp 36.9 °C (98.5 °F) (Temporal)   Ht 1.778 m (5' 10\")   Wt (!) 129 kg (284 lb 9.6 oz)   SpO2 94%     Constitutional:   Body Habitus: Body mass index is 40.84 kg/m².  Cooperation: Fully cooperates with exam  Appearance: Well-groomed, well-nourished, not disheveled     Eyes: No scleral icterus to suggest severe liver disease, no proptosis to suggest severe hyperthyroid    ENT -no obvious auditory deficits, no obvious tongue lesions, tongue midline, no facial droop     Skin -no rashes or lesions noted     Respiratory-  breathing comfortable on room air, no audible wheezing    Cardiovascular- capillary refills less than 2 seconds.     Psychiatric- alert and oriented ×3. Normal affect.     Gait - normal gait, no use of ambulatory device, nonantalgic.     Musculoskeletal and Neuro -     Physical Exam  No signs of infection around the knees. Crepitus is present in the left knee. Anterior and posterior drawer tests are negative. Well-healed scars are observed on both knees. Positive tenderness to palpation of the greater trochanter, near the attachment of the gluteus medius tendon and bursa on the right hip, negative on the left. No tenderness to palpation throughout the lumbar spine. Limited range of motion of the lumbar spine. Facet loading maneuver is positive bilaterally. Straight leg raise maneuver is negative bilaterally.       MEDICAL DECISION MAKING    Medical records review: see under HPI section.     DATA    Labs:   Lab Results   Component Value Date/Time    SODIUM 140 09/26/2023 09:08 AM    POTASSIUM 4.0 09/26/2023 09:08 AM    CHLORIDE 104 09/26/2023 09:08 AM    CO2 26 09/26/2023 09:08 AM    ANION 10.0 09/26/2023 09:08 AM    GLUCOSE 126 (H) 09/26/2023 09:08 AM    BUN 13 09/26/2023 09:08 AM    CREATININE 0.67 09/26/2023 09:08 AM    CALCIUM 9.4 09/26/2023 " "09:08 AM    ASTSGOT 18 06/07/2023 07:17 AM    ALTSGPT 22 06/07/2023 07:17 AM    TBILIRUBIN 0.7 06/07/2023 07:17 AM    ALBUMIN 4.2 06/07/2023 07:17 AM    TOTPROTEIN 7.5 06/07/2023 07:17 AM    GLOBULIN 3.3 06/07/2023 07:17 AM    AGRATIO 1.3 06/07/2023 07:17 AM   ]    No results found for: \"PROTHROMBTM\", \"INR\"     Lab Results   Component Value Date/Time    WBC 9.4 09/26/2023 09:08 AM    RBC 5.18 09/26/2023 09:08 AM    HEMOGLOBIN 16.0 09/26/2023 09:08 AM    HEMATOCRIT 47.1 09/26/2023 09:08 AM    MCV 90.9 09/26/2023 09:08 AM    MCH 30.9 09/26/2023 09:08 AM    MCHC 34.0 09/26/2023 09:08 AM    MPV 9.1 09/26/2023 09:08 AM        Lab Results   Component Value Date/Time    HBA1C 6.1 (A) 06/20/2025 10:15 AM        Imaging:   I personally reviewed following images, these are my reads      Results       X-ray left knee 9/14/2024 degenerative changes in the knee.  No acute changes.    IMAGING radiology reads. I reviewed the following radiology reads                        Results for orders placed during the hospital encounter of 05/02/05    MR-LUMBAR SPINE-WITH & W/O    Impression  IMPRESSION:    1. POSTOPERATIVE LUMBAR SPINE WITH EVIDENCE OF PREVIOUS POSTERIOR PEDICLE  SCREW FIXATION AT L5-S1.  2. MILD CENTRAL CANAL STENOSIS AT L3-4 RELATED TO A COMBINATION OF  DEGENERATIVE FACTORS AS ABOVE.  3. MILD BILATERAL FORAMINAL STENOSES AT L4-5 RELATED TO LATERAL EXTENSION  OF BULGING DISC MATERIAL.          Read By GLO OSWALD MD on May  2 2005 11:58AM  : LOYD Transcription Date: May  2 2005  2:41PM  THIS DOCUMENT HAS BEEN ELECTRONICALLY SIGNED BY: GLO OSWALD MD on  May  2 2005  5:18PM                                                 Results for orders placed in visit on 03/18/20    DX-ELBOW-COMPLETE 3+ RIGHT    Impression  No evidence of acute fracture or dislocation.              Results for orders placed in visit on 03/18/20    DX-HUMERUS 2+ RIGHT    Impression  No acute osseous abnormality.    Findings " suggesting calcific tendinitis of the rotator cuff.   Results for orders placed during the hospital encounter of 10/05/23    DX-KNEE 2- RIGHT    Impression  Post right knee tricompartment arthroplasty.   Results for orders placed in visit on 10/30/24    DX-KNEE 3 VIEWS RIGHT    Results for orders placed in visit on 12/12/23    DX-KNEE COMPLETE 4+ RIGHT    Results for orders placed in visit on 03/06/23    DX-LUMBAR SPINE-4+ VIEWS            Results for orders placed in visit on 03/18/20    DX-SHOULDER 2+ RIGHT    Impression  Mild degenerative changes without acute osseous abnormality.              Diagnosis   Visit Diagnoses     ICD-10-CM   1. Bilateral chronic knee pain  M25.561    M25.562    G89.29   2. Arthritis of knee, left  M17.12   3. History of bilateral knee replacement october 2023 by Dr Medina  Z96.653   4. Chronic knee pain after total replacement of right knee joint  M25.561    G89.29    Z96.651   5. Chronic bilateral low back pain without sciatica  M54.50    G89.29   6. Greater trochanteric pain syndrome of right lower extremity  M25.551           ASSESSMENT AND PLAN:  Braden Tramalissa Anthony 60 y.o. male      Braden was seen today for new patient.    Diagnoses and all orders for this visit:    Bilateral chronic knee pain  -     Pain Hospital Procedure; Future  -     Pain Hospital Procedure; Future    Arthritis of knee, left  -     Pain Hospital Procedure; Future  -     Pain Hospital Procedure; Future    History of bilateral knee replacement october 2023 by Dr Medina    Chronic knee pain after total replacement of right knee joint    Chronic bilateral low back pain without sciatica    Greater trochanteric pain syndrome of right lower extremity            Assessment & Plan  Bilateral knee pain, left worse than right with left knee arthritis.  He has failed conservative treatments in the past including physical therapy, home exercise program, injections, medication management.  Diagnostic plan: Plan  to proceed with left knee genicular nerve blocks for diagnostic purposes.  Treatment plan: If positive, plan to proceed with left knee genicular nerve neurotomy after the genicular nerve blocks if they are positive.  Clinical decision making: Risks, benefits, and alternatives were discussed.    Chronic right knee pain after knee replacement.  Consider genicular nerve block and neurotomy for the right knee depending on his response to the left knee.    Chronic low back pain.  Treatment plan: Continue home exercise program.    Greater trochanteric syndrome, right lower extremity.  Treatment plan: Continue home exercise program. Consider injection at follow-up visits.    Medication management.  Treatment plan: Continue ibuprofen up to 600 mg at a time up to twice daily as needed with food. Continue Voltaren gel as needed. Continue Robaxin as needed.    PROCEDURE    The patient has undergone multiple knee surgeries including arthroscopic surgery on the left knee and both arthroscopic surgery and knee replacement on the right knee. He has also had 2 back surgeries. Additionally, he has received steroid injections in his back, legs, and hip bursa.        Follow-up: After the above diagnostic and therapeutic procedures          Please note that this dictation was created using voice recognition software. I have made every reasonable attempt to correct obvious errors but there may be errors of grammar and content that I may have overlooked prior to finalization of this note.      Erik Burns MD  Physical Medicine and Rehabilitation  Interventional Spine and Sports Physiatry  [facility]         BLADIMIR Reaves D.O.   CC Geovanna Marx,* .       [1]   Past Medical History:  Diagnosis Date    Arthritis     osteo- Knees    Chronic pain of both knees     Chronic pain of right knee     Dyslipidemia     Essential hypertension     High cholesterol     Hyperlipidemia     Hypertension     Lab test positive  for detection of COVID-19 virus 01/03/2022    Positive for Covid December 29.    Onychomycosis     Pain     knees    Right knee pain 1/30/2024    Tricompartment osteoarthritis of right knee     Type 2 diabetes mellitus without complication (HCC)     borderline- pre diabetic per patient    Vitamin B12 deficiency    [2]   Current Outpatient Medications on File Prior to Visit   Medication Sig Dispense Refill    clobetasol (TEMOVATE) 0.05 % Ointment Apply 1 Application  topically every day. 15 g 1    Semaglutide, 1 MG/DOSE, (OZEMPIC, 1 MG/DOSE,) 4 MG/3ML Solution Pen-injector INJECT SUBCUTANEOUSLY 1 MG EVERY WEEK 9 mL 3    lidocaine (LIDODERM) 5 % Patch Place  on the skin.      zolpidem (AMBIEN) 5 MG Tab Take 1 Tablet by mouth at bedtime as needed.      methocarbamol (ROBAXIN) 750 MG Tab Take 750 mg by mouth.      gabapentin (NEURONTIN) 100 MG Cap 1 capsule PO BID. May slowly increase to a max of 900mg PO BID 90 Capsule 2    amoxicillin (AMOXIL) 500 MG Cap       diclofenac sodium (VOLTAREN) 1 % Gel Apply  topically.      HYDROcodone-acetaminophen (NORCO) 5-325 MG Tab per tablet       ibuprofen (MOTRIN) 600 MG Tab Take 1 Tablet by mouth 2 times a day as needed.      amLODIPine (NORVASC) 5 MG Tab Take 5 mg by mouth every day.      aspirin 81 MG EC tablet Take 1 Tablet by mouth every day.      atorvastatin (LIPITOR) 20 MG Tab Take 20 mg by mouth.      hydrOXYzine HCl (ATARAX) 25 MG Tab 25 mg.      losartan (COZAAR) 50 MG Tab Take 50 mg by mouth.      ibuprofen (MOTRIN) 600 MG Tab Take 600 mg by mouth.      metFORMIN ER (GLUCOPHAGE XR) 500 MG TABLET SR 24 HR Take 1 Tablet by mouth 2 times a day. 180 Tablet 3    losartan (COZAAR) 100 MG Tab TAKE 1 TABLET BY MOUTH IN THE  EVENING 90 Tablet 3    amLODIPine (NORVASC) 10 MG Tab TAKE 1 TABLET BY MOUTH IN THE  EVENING 90 Tablet 3    atorvastatin (LIPITOR) 40 MG Tab TAKE 1 TABLET BY MOUTH IN THE  EVENING 90 Tablet 3    vitamin D2, Ergocalciferol, (DRISDOL) 1.25 MG (59026 UT) Cap  capsule Take 1 Capsule by mouth every 7 days. 6 Capsule 0    ketoconazole (NIZORAL) 2 % Cream Apply 1 Application topically every day. 15 g 0    hydrOXYzine HCl (ATARAX) 25 MG Tab TAKE ONE TABLET BY MOUTH THREE TIMES A DAY AS NEEDED FOR ITCHING / ANXIETY 90 Tablet 5    aspirin EC (ECOTRIN) 81 MG Tablet Delayed Response Take 1 tablet by mouth every day. 30 tablet 11    cyanocobalamin (VITAMIN B12) 1000 MCG Tab Take 1 Tab by mouth every day. 30 Tab 11    Psyllium (METAMUCIL FIBER PO) Take  by mouth every day.       No current facility-administered medications on file prior to visit.   [3]   Past Surgical History:  Procedure Laterality Date    PB KNEE SCOPE,FULL SYNOVECT Right 3/25/2024    Procedure: RIGHT KNEE EXTENSIVE ARTHROSCOPY, SYNOVECTOMY, MANIPULATION.;  Surgeon: Vinayak Kuhn M.D.;  Location: [facility];  Service: Orthopedics    PB TOTAL KNEE ARTHROPLASTY Right 10/5/2023    Procedure: ARTHROPLASTY, KNEE, TOTAL, RIGHT;  Surgeon: Vinayak Kuhn M.D.;  Location: [facility];  Service: Orthopedics    KNEE ARTHROSCOPY Bilateral 05/30/2019    Procedure: ARTHROSCOPY, KNEE;  Surgeon: Vinayak Kuhn M.D.;  Location: [facility];  Service: Orthopedics    MENISCECTOMY, KNEE, MEDIAL Bilateral 05/30/2019    Procedure: MENISCECTOMY, KNEE, MEDIAL - PARTIAL;  Surgeon: Vinayak Kuhn M.D.;  Location: [facility];  Service: Orthopedics    KNEE ARTHROSCOPY Right 2006    FUSION, SPINE, LUMBAR, PLIF  1999, 2001    x2 surgeries    ROTATOR CUFF REPAIR Right    [4]   Outpatient Medications Marked as Taking for the 6/25/25 encounter (Office Visit) with Erik Burns M.D.   Medication Sig Dispense Refill    clobetasol (TEMOVATE) 0.05 % Ointment Apply 1 Application  topically every day. 15 g 1    Semaglutide, 1 MG/DOSE, (OZEMPIC, 1 MG/DOSE,) 4 MG/3ML Solution Pen-injector INJECT SUBCUTANEOUSLY 1 MG EVERY WEEK 9 mL 3    lidocaine (LIDODERM) 5 % Patch Place  on the skin.       zolpidem (AMBIEN) 5 MG Tab Take 1 Tablet by mouth at bedtime as needed.      methocarbamol (ROBAXIN) 750 MG Tab Take 750 mg by mouth.      gabapentin (NEURONTIN) 100 MG Cap 1 capsule PO BID. May slowly increase to a max of 900mg PO BID 90 Capsule 2    amoxicillin (AMOXIL) 500 MG Cap       diclofenac sodium (VOLTAREN) 1 % Gel Apply  topically.      HYDROcodone-acetaminophen (NORCO) 5-325 MG Tab per tablet       ibuprofen (MOTRIN) 600 MG Tab Take 1 Tablet by mouth 2 times a day as needed.      amLODIPine (NORVASC) 5 MG Tab Take 5 mg by mouth every day.      aspirin 81 MG EC tablet Take 1 Tablet by mouth every day.      atorvastatin (LIPITOR) 20 MG Tab Take 20 mg by mouth.      hydrOXYzine HCl (ATARAX) 25 MG Tab 25 mg.      losartan (COZAAR) 50 MG Tab Take 50 mg by mouth.      ibuprofen (MOTRIN) 600 MG Tab Take 600 mg by mouth.      metFORMIN ER (GLUCOPHAGE XR) 500 MG TABLET SR 24 HR Take 1 Tablet by mouth 2 times a day. 180 Tablet 3    losartan (COZAAR) 100 MG Tab TAKE 1 TABLET BY MOUTH IN THE  EVENING 90 Tablet 3    amLODIPine (NORVASC) 10 MG Tab TAKE 1 TABLET BY MOUTH IN THE  EVENING 90 Tablet 3    atorvastatin (LIPITOR) 40 MG Tab TAKE 1 TABLET BY MOUTH IN THE  EVENING 90 Tablet 3    vitamin D2, Ergocalciferol, (DRISDOL) 1.25 MG (74556 UT) Cap capsule Take 1 Capsule by mouth every 7 days. 6 Capsule 0    ketoconazole (NIZORAL) 2 % Cream Apply 1 Application topically every day. 15 g 0    hydrOXYzine HCl (ATARAX) 25 MG Tab TAKE ONE TABLET BY MOUTH THREE TIMES A DAY AS NEEDED FOR ITCHING / ANXIETY 90 Tablet 5    aspirin EC (ECOTRIN) 81 MG Tablet Delayed Response Take 1 tablet by mouth every day. 30 tablet 11    cyanocobalamin (VITAMIN B12) 1000 MCG Tab Take 1 Tab by mouth every day. 30 Tab 11    Psyllium (METAMUCIL FIBER PO) Take  by mouth every day.     [5]   Allergies  Allergen Reactions    Percocet [Perloxx] Itching

## 2025-07-01 ENCOUNTER — HOSPITAL ENCOUNTER (OUTPATIENT)
Facility: REHABILITATION | Age: 60
End: 2025-07-01
Attending: PHYSICAL MEDICINE & REHABILITATION | Admitting: PHYSICAL MEDICINE & REHABILITATION
Payer: COMMERCIAL

## 2025-07-01 NOTE — Clinical Note
REFERRAL APPROVAL NOTICE         Sent on July 1, 2025                   Braden Cruz  [address] [place] [address]                   Dear Mr. Cruz,    After a careful review of the medical information and benefit coverage, [facility] has processed your referral. See below for additional details.    If applicable, you must be actively enrolled with your insurance for coverage of the authorized service. If you have any questions regarding your coverage, please contact your insurance directly.    REFERRAL INFORMATION   Referral #:  [id number]  Referred-To Department    Referred-By Provider:  Dermatology    [facility]      [address]  [address]  [place] [id number] [phone] [address]  [place] [id number] [phone]    Referral Start Date:  06/20/2025  Referral End Date:   06/20/2026             SCHEDULING  If you do not already have an appointment, please call [phone] to make an appointment.     MORE INFORMATION  If you do not already have a [employer] account, sign up at: [email or web address]  You can access your medical information, make appointments, see lab results, billing information, and more.  If you have questions regarding this referral, please contact  the [facility] Referrals department at:             [phone]. Monday - Friday 8:00AM - 5:00PM.     Sincerely,    [facility]

## (undated) DEVICE — CANISTER SUCTION RIGID RED 1500CC (40EA/CA)

## (undated) DEVICE — SUTURE 3-0 ETHILON FS-1 - (36/BX) 30 INCH

## (undated) DEVICE — ELECTRODE DUAL RETURN W/ CORD - (50/PK)

## (undated) DEVICE — MASK ANESTHESIA ADULT  - (100/CA)

## (undated) DEVICE — LENS/HOOD FOR SPACESUIT - (32/PK) PEEL AWAY FACE

## (undated) DEVICE — HANDPIECE 10FT INTPLS SCT PLS IRRIGATION HAND CONTROL SET (6/PK)

## (undated) DEVICE — SODIUM CHL IRRIGATION 0.9% 1000ML (12EA/CA)

## (undated) DEVICE — TUBE CONNECTING SUCTION - CLEAR PLASTIC STERILE 72 IN (50EA/CA)

## (undated) DEVICE — KIT ROOM DECONTAMINATION

## (undated) DEVICE — GLOVE BIOGEL SZ 8 SURGICAL PF LTX - (50PR/BX 4BX/CA)

## (undated) DEVICE — SUCTION INSTRUMENT YANKAUER BULBOUS TIP W/O VENT (50EA/CA)

## (undated) DEVICE — BLADE SAGITTAL SAW DUAL CUT 25.0 X 90.0 X 1.27MM (1/EA)

## (undated) DEVICE — GLOVE BIOGEL PI ORTHO SZ 8 PF LF (40PR/BX)

## (undated) DEVICE — SUTURE 2-0 VICRYL PLUS CT-1 - 8 X 18 INCH(12/BX)

## (undated) DEVICE — PADDING CAST 6 IN STERILE - 6 X 4 YDS (24/CA)

## (undated) DEVICE — SENSOR OXIMETER ADULT SPO2 RD SET (20EA/BX)

## (undated) DEVICE — NEPTUNE 4 PORT MANIFOLD - (20/PK)

## (undated) DEVICE — TUBING PUMP WITH CONNECTOR REDEUCE (1EA)

## (undated) DEVICE — KIT ANESTHESIA W/CIRCUIT & 3/LT BAG W/FILTER (20EA/CA)

## (undated) DEVICE — GOWN WARMING STANDARD FLEX - (30/CA)

## (undated) DEVICE — BAG, SPONGE COUNT 50600

## (undated) DEVICE — ELECTRODE 850 FOAM ADHESIVE - HYDROGEL RADIOTRNSPRNT (50/PK)

## (undated) DEVICE — GLOVE, LITE (PAIR)

## (undated) DEVICE — PACK TOTAL KNEE  (1/CA)

## (undated) DEVICE — PACK KNEE ARTHROSCOPY SM OR - (2EA/CA)

## (undated) DEVICE — BAG SPONGE COUNT 10.25 X 32 - BLUE (250/CA)

## (undated) DEVICE — SUTURE GENERAL

## (undated) DEVICE — TIP INTPLS HFLO ML ORFC BTRY - (12/CS)  FOR SURGILAV

## (undated) DEVICE — PROTECTOR ULNA NERVE - (36PR/CA)

## (undated) DEVICE — HEAD HOLDER JUNIOR/ADULT

## (undated) DEVICE — SODIUM CHL. IRRIGATION 0.9% 3000ML (4EA/CA 65CA/PF)

## (undated) DEVICE — COVER LIGHT HANDLE FLEXIBLE - SOFT (2EA/PK 80PK/CA)

## (undated) DEVICE — Device

## (undated) DEVICE — BANDAGE ELASTIC STERILE VELCRO 6 X 5 YDS (25EA/CA)

## (undated) DEVICE — CHLORAPREP 26 ML APPLICATOR - ORANGE TINT(25/CA)

## (undated) DEVICE — DRAPE LARGE 3 QUARTER - (20/CA)

## (undated) DEVICE — LACTATED RINGERS INJ 1000 ML - (14EA/CA 60CA/PF)

## (undated) DEVICE — SPONGE GAUZESTER 4 X 4 4PLY - (128PK/CA)

## (undated) DEVICE — WATER IRRIGATION STERILE 1000ML (12EA/CA)

## (undated) DEVICE — SYS BN CMNT HI VAC KT MXR BWL - (MIX-E-VAC II)  (10EA/CA)

## (undated) DEVICE — TUBING PATIENT W/CONNECTOR REDEUCE (1EA)

## (undated) DEVICE — SUTURE 1 VICRYL PLUS CTX - 8 X 18 INCH (12/BX)

## (undated) DEVICE — HUMID-VENT HEAT AND MOISTURE EXCHANGE- (50/BX)

## (undated) DEVICE — PINS, HEADLESS

## (undated) DEVICE — STOCKINETTE IMPERVIOUS 12X48 - STERILELF (10/CA)"

## (undated) DEVICE — GLOVE BIOGEL INDICATOR SZ 8 SURGICAL PF LTX - (50/BX 4BX/CA)

## (undated) DEVICE — DRESSING AQUACEL AG ADVANTAGE 3.5 X 10" (10EA/BX)"

## (undated) DEVICE — SHAVER4.0 AGGRESSIVE + FORMLA (5EA/BX)

## (undated) DEVICE — SENSOR SPO2 NEO LNCS ADHESIVE (20/BX) SEE USER NOTES

## (undated) DEVICE — STAPLER SKIN DISP - (6/BX 10BX/CA) VISISTAT